# Patient Record
Sex: MALE | Race: WHITE | NOT HISPANIC OR LATINO | Employment: OTHER | RURAL
[De-identification: names, ages, dates, MRNs, and addresses within clinical notes are randomized per-mention and may not be internally consistent; named-entity substitution may affect disease eponyms.]

---

## 2013-03-20 LAB — CRC RECOMMENDATION EXT: NORMAL

## 2013-03-25 LAB — CRC RECOMMENDATION EXT: NORMAL

## 2020-05-11 ENCOUNTER — HISTORICAL (OUTPATIENT)
Dept: ADMINISTRATIVE | Facility: HOSPITAL | Age: 70
End: 2020-05-11

## 2020-11-10 ENCOUNTER — HISTORICAL (OUTPATIENT)
Dept: ADMINISTRATIVE | Facility: HOSPITAL | Age: 70
End: 2020-11-10

## 2020-11-10 LAB — SARS-COV+SARS-COV-2 AG RESP QL IA.RAPID: NEGATIVE

## 2021-03-24 DIAGNOSIS — R52 PAIN: Primary | ICD-10-CM

## 2021-03-24 RX ORDER — HYDROCODONE BITARTRATE AND ACETAMINOPHEN 10; 325 MG/1; MG/1
1 TABLET ORAL 3 TIMES DAILY PRN
Qty: 90 TABLET | Refills: 0 | Status: SHIPPED | OUTPATIENT
Start: 2021-03-24 | End: 2021-04-26 | Stop reason: SDUPTHER

## 2021-03-24 RX ORDER — HYDROCODONE BITARTRATE AND ACETAMINOPHEN 10; 325 MG/1; MG/1
1 TABLET ORAL 3 TIMES DAILY PRN
COMMUNITY
Start: 2021-02-23 | End: 2021-03-24 | Stop reason: SDUPTHER

## 2021-03-29 RX ORDER — LISINOPRIL 40 MG/1
40 TABLET ORAL DAILY
COMMUNITY
End: 2021-03-29 | Stop reason: SDUPTHER

## 2021-03-29 RX ORDER — LISINOPRIL 40 MG/1
40 TABLET ORAL DAILY
Qty: 30 TABLET | Refills: 1 | Status: SHIPPED | OUTPATIENT
Start: 2021-03-29 | End: 2021-06-15 | Stop reason: SDUPTHER

## 2021-04-06 DIAGNOSIS — G62.9 NEUROPATHY: Primary | ICD-10-CM

## 2021-04-07 RX ORDER — GABAPENTIN 600 MG/1
2 TABLET ORAL 2 TIMES DAILY PRN
COMMUNITY
Start: 2021-02-24 | End: 2021-04-07 | Stop reason: SDUPTHER

## 2021-04-07 RX ORDER — GABAPENTIN 600 MG/1
1200 TABLET ORAL 2 TIMES DAILY
Qty: 360 TABLET | Refills: 0 | Status: SHIPPED | OUTPATIENT
Start: 2021-04-07 | End: 2021-04-26 | Stop reason: SDUPTHER

## 2021-04-26 ENCOUNTER — OFFICE VISIT (OUTPATIENT)
Dept: FAMILY MEDICINE | Facility: CLINIC | Age: 71
End: 2021-04-26
Payer: MEDICARE

## 2021-04-26 VITALS
SYSTOLIC BLOOD PRESSURE: 125 MMHG | HEART RATE: 68 BPM | OXYGEN SATURATION: 96 % | BODY MASS INDEX: 33.93 KG/M2 | DIASTOLIC BLOOD PRESSURE: 72 MMHG | HEIGHT: 70 IN | WEIGHT: 237 LBS | TEMPERATURE: 99 F | RESPIRATION RATE: 16 BRPM

## 2021-04-26 DIAGNOSIS — G62.9 NEUROPATHY: ICD-10-CM

## 2021-04-26 DIAGNOSIS — N40.0 BENIGN PROSTATIC HYPERPLASIA, UNSPECIFIED WHETHER LOWER URINARY TRACT SYMPTOMS PRESENT: ICD-10-CM

## 2021-04-26 DIAGNOSIS — R52 PAIN: ICD-10-CM

## 2021-04-26 DIAGNOSIS — Z78.9 ALLERGY HISTORY UNKNOWN: Primary | ICD-10-CM

## 2021-04-26 PROCEDURE — 99214 OFFICE O/P EST MOD 30 MIN: CPT | Mod: 25,,, | Performed by: FAMILY MEDICINE

## 2021-04-26 PROCEDURE — 96372 THER/PROPH/DIAG INJ SC/IM: CPT | Mod: ,,, | Performed by: FAMILY MEDICINE

## 2021-04-26 PROCEDURE — 99214 PR OFFICE/OUTPT VISIT, EST, LEVL IV, 30-39 MIN: ICD-10-PCS | Mod: 25,,, | Performed by: FAMILY MEDICINE

## 2021-04-26 PROCEDURE — 96372 PR INJECTION,THERAP/PROPH/DIAG2ST, IM OR SUBCUT: ICD-10-PCS | Mod: ,,, | Performed by: FAMILY MEDICINE

## 2021-04-26 RX ORDER — KETOROLAC TROMETHAMINE 30 MG/ML
60 INJECTION, SOLUTION INTRAMUSCULAR; INTRAVENOUS
Status: COMPLETED | OUTPATIENT
Start: 2021-04-26 | End: 2021-04-26

## 2021-04-26 RX ORDER — ESCITALOPRAM OXALATE 20 MG/1
TABLET ORAL
COMMUNITY
Start: 2021-03-04 | End: 2021-05-03 | Stop reason: SDUPTHER

## 2021-04-26 RX ORDER — DEXAMETHASONE SODIUM PHOSPHATE 4 MG/ML
4 INJECTION, SOLUTION INTRA-ARTICULAR; INTRALESIONAL; INTRAMUSCULAR; INTRAVENOUS; SOFT TISSUE
Status: COMPLETED | OUTPATIENT
Start: 2021-04-26 | End: 2021-04-26

## 2021-04-26 RX ORDER — EZETIMIBE 10 MG/1
TABLET ORAL
COMMUNITY
Start: 2021-04-25 | End: 2021-08-26 | Stop reason: SDUPTHER

## 2021-04-26 RX ORDER — LORATADINE 10 MG/1
TABLET ORAL
COMMUNITY
Start: 2021-02-24 | End: 2021-04-26 | Stop reason: SDUPTHER

## 2021-04-26 RX ORDER — HYDROCODONE BITARTRATE AND ACETAMINOPHEN 10; 325 MG/1; MG/1
1 TABLET ORAL 3 TIMES DAILY PRN
Qty: 90 TABLET | Refills: 0 | Status: SHIPPED | OUTPATIENT
Start: 2021-04-26 | End: 2021-06-07 | Stop reason: SDUPTHER

## 2021-04-26 RX ORDER — PRAMIPEXOLE DIHYDROCHLORIDE 0.25 MG/1
0.25 TABLET ORAL 2 TIMES DAILY
COMMUNITY
Start: 2021-01-19 | End: 2021-07-12 | Stop reason: SDUPTHER

## 2021-04-26 RX ORDER — TAMSULOSIN HYDROCHLORIDE 0.4 MG/1
CAPSULE ORAL
COMMUNITY
Start: 2021-03-04 | End: 2021-07-23 | Stop reason: SDUPTHER

## 2021-04-26 RX ORDER — DICLOFENAC SODIUM 10 MG/G
GEL TOPICAL 4 TIMES DAILY PRN
COMMUNITY
Start: 2021-03-04 | End: 2021-05-10 | Stop reason: SDUPTHER

## 2021-04-26 RX ORDER — GABAPENTIN 600 MG/1
1200 TABLET ORAL 2 TIMES DAILY
Qty: 360 TABLET | Refills: 0 | Status: SHIPPED | OUTPATIENT
Start: 2021-04-26 | End: 2021-09-27 | Stop reason: SDUPTHER

## 2021-04-26 RX ORDER — METHOCARBAMOL 750 MG/1
1500 TABLET, FILM COATED ORAL 4 TIMES DAILY
Qty: 80 TABLET | Refills: 0 | Status: SHIPPED | OUTPATIENT
Start: 2021-04-26 | End: 2021-05-06

## 2021-04-26 RX ORDER — DUTASTERIDE 0.5 MG/1
0.5 CAPSULE, LIQUID FILLED ORAL DAILY
Qty: 30 CAPSULE | Refills: 2 | Status: SHIPPED | OUTPATIENT
Start: 2021-04-26 | End: 2022-04-12

## 2021-04-26 RX ORDER — DULOXETIN HYDROCHLORIDE 60 MG/1
CAPSULE, DELAYED RELEASE ORAL
COMMUNITY
Start: 2021-02-11 | End: 2021-05-03 | Stop reason: SDUPTHER

## 2021-04-26 RX ORDER — METHYLPREDNISOLONE ACETATE 40 MG/ML
40 INJECTION, SUSPENSION INTRA-ARTICULAR; INTRALESIONAL; INTRAMUSCULAR; SOFT TISSUE
Status: COMPLETED | OUTPATIENT
Start: 2021-04-26 | End: 2021-04-26

## 2021-04-26 RX ORDER — LORATADINE 10 MG/1
10 TABLET ORAL DAILY
Qty: 90 TABLET | Refills: 1 | Status: SHIPPED | OUTPATIENT
Start: 2021-04-26 | End: 2022-04-12 | Stop reason: SDUPTHER

## 2021-04-26 RX ADMIN — METHYLPREDNISOLONE ACETATE 40 MG: 40 INJECTION, SUSPENSION INTRA-ARTICULAR; INTRALESIONAL; INTRAMUSCULAR; SOFT TISSUE at 04:04

## 2021-04-26 RX ADMIN — DEXAMETHASONE SODIUM PHOSPHATE 4 MG: 4 INJECTION, SOLUTION INTRA-ARTICULAR; INTRALESIONAL; INTRAMUSCULAR; INTRAVENOUS; SOFT TISSUE at 04:04

## 2021-04-26 RX ADMIN — KETOROLAC TROMETHAMINE 60 MG: 30 INJECTION, SOLUTION INTRAMUSCULAR; INTRAVENOUS at 04:04

## 2021-05-03 DIAGNOSIS — F32.A DEPRESSION, UNSPECIFIED DEPRESSION TYPE: ICD-10-CM

## 2021-05-03 DIAGNOSIS — F41.9 ANXIETY: Primary | ICD-10-CM

## 2021-05-03 RX ORDER — ESCITALOPRAM OXALATE 20 MG/1
20 TABLET ORAL DAILY
Qty: 30 TABLET | Refills: 2 | Status: SHIPPED | OUTPATIENT
Start: 2021-05-03 | End: 2021-06-15 | Stop reason: SDUPTHER

## 2021-05-03 RX ORDER — DULOXETIN HYDROCHLORIDE 60 MG/1
60 CAPSULE, DELAYED RELEASE ORAL 2 TIMES DAILY
Qty: 180 CAPSULE | Refills: 0 | Status: SHIPPED | OUTPATIENT
Start: 2021-05-03 | End: 2021-07-23 | Stop reason: SDUPTHER

## 2021-05-10 DIAGNOSIS — R52 PAIN: Primary | ICD-10-CM

## 2021-05-10 RX ORDER — DICLOFENAC SODIUM 10 MG/G
GEL TOPICAL
Qty: 3000 G | Refills: 3 | Status: SHIPPED | OUTPATIENT
Start: 2021-05-10 | End: 2022-09-30 | Stop reason: SDUPTHER

## 2021-06-02 RX ORDER — LISINOPRIL 40 MG/1
40 TABLET ORAL DAILY
Qty: 30 TABLET | Refills: 0 | OUTPATIENT
Start: 2021-06-02

## 2021-06-07 ENCOUNTER — OFFICE VISIT (OUTPATIENT)
Dept: FAMILY MEDICINE | Facility: CLINIC | Age: 71
End: 2021-06-07
Payer: MEDICARE

## 2021-06-07 VITALS
HEART RATE: 79 BPM | OXYGEN SATURATION: 95 % | RESPIRATION RATE: 16 BRPM | BODY MASS INDEX: 34.22 KG/M2 | HEIGHT: 70 IN | SYSTOLIC BLOOD PRESSURE: 114 MMHG | DIASTOLIC BLOOD PRESSURE: 63 MMHG | TEMPERATURE: 98 F | WEIGHT: 239 LBS

## 2021-06-07 DIAGNOSIS — R52 PAIN: Primary | ICD-10-CM

## 2021-06-07 DIAGNOSIS — N40.0 BENIGN PROSTATIC HYPERPLASIA, UNSPECIFIED WHETHER LOWER URINARY TRACT SYMPTOMS PRESENT: ICD-10-CM

## 2021-06-07 DIAGNOSIS — G62.9 NEUROPATHY: ICD-10-CM

## 2021-06-07 LAB

## 2021-06-07 PROCEDURE — 99214 PR OFFICE/OUTPT VISIT, EST, LEVL IV, 30-39 MIN: ICD-10-PCS | Mod: ,,, | Performed by: FAMILY MEDICINE

## 2021-06-07 PROCEDURE — G0480 DRUG SCREEN DEFINITIVE 7, URINE: ICD-10-PCS | Mod: ,,, | Performed by: CLINICAL MEDICAL LABORATORY

## 2021-06-07 PROCEDURE — G0480 DRUG TEST DEF 1-7 CLASSES: HCPCS | Mod: ,,, | Performed by: CLINICAL MEDICAL LABORATORY

## 2021-06-07 PROCEDURE — 80305 DRUG TEST PRSMV DIR OPT OBS: CPT | Mod: GA,RHCUB | Performed by: FAMILY MEDICINE

## 2021-06-07 PROCEDURE — 99214 OFFICE O/P EST MOD 30 MIN: CPT | Mod: ,,, | Performed by: FAMILY MEDICINE

## 2021-06-07 RX ORDER — HYDROCODONE BITARTRATE AND ACETAMINOPHEN 10; 325 MG/1; MG/1
1 TABLET ORAL 3 TIMES DAILY PRN
Qty: 90 TABLET | Refills: 0 | Status: SHIPPED | OUTPATIENT
Start: 2021-06-07 | End: 2021-06-29 | Stop reason: SDUPTHER

## 2021-06-10 ENCOUNTER — OFFICE VISIT (OUTPATIENT)
Dept: FAMILY MEDICINE | Facility: CLINIC | Age: 71
End: 2021-06-10
Payer: MEDICARE

## 2021-06-10 VITALS
SYSTOLIC BLOOD PRESSURE: 130 MMHG | OXYGEN SATURATION: 92 % | BODY MASS INDEX: 34.5 KG/M2 | DIASTOLIC BLOOD PRESSURE: 78 MMHG | HEIGHT: 70 IN | WEIGHT: 241 LBS | TEMPERATURE: 99 F | RESPIRATION RATE: 20 BRPM | HEART RATE: 81 BPM

## 2021-06-10 DIAGNOSIS — G47.00 INSOMNIA, UNSPECIFIED TYPE: ICD-10-CM

## 2021-06-10 DIAGNOSIS — F19.939 WITHDRAWAL FROM OTHER PSYCHOACTIVE SUBSTANCE: Primary | ICD-10-CM

## 2021-06-10 PROCEDURE — 99203 PR OFFICE/OUTPT VISIT, NEW, LEVL III, 30-44 MIN: ICD-10-PCS | Mod: GC,,, | Performed by: FAMILY MEDICINE

## 2021-06-10 PROCEDURE — 99203 OFFICE O/P NEW LOW 30 MIN: CPT | Mod: GC,,, | Performed by: FAMILY MEDICINE

## 2021-06-10 RX ORDER — TRAZODONE HYDROCHLORIDE 100 MG/1
2 TABLET ORAL NIGHTLY
COMMUNITY
Start: 2021-05-19 | End: 2021-08-26 | Stop reason: SDUPTHER

## 2021-06-11 LAB
6-ACETYLMORPHINE, URINE (RUSH): NEGATIVE 10 NG/ML
7-AMINOCLONAZEPAM, URINE (RUSH): NEGATIVE 25 NG/ML
A-HYDROXYALPRAZOLAM, URINE (RUSH): NEGATIVE 25 NG/ML
AMPHET UR QL SCN: NEGATIVE 100 NG/ML
BENZOYLECGONINE, URINE (RUSH): NEGATIVE 100 NG/ML
BUTALBITAL, URINE (RUSH): NEGATIVE 50 NG/ML
CODEINE, URINE (RUSH): NEGATIVE 25 NG/ML
CREAT UR-MCNC: 175 MG/DL (ref 39–259)
HYDROCODONE, URINE (RUSH): 33.3 25 NG/ML
HYDROMORPHONE, URINE (RUSH): NEGATIVE 25 NG/ML
LORAZEPAM, URINE (RUSH): NEGATIVE 25 NG/ML
METHAMPHET UR QL SCN: NEGATIVE 100 NG/ML
MORPHINE, URINE (RUSH): NEGATIVE 25 NG/ML
NORDIAZEPAM, URINE (RUSH): NEGATIVE 25 NG/ML
NORHYDROCODONE, URINE (RUSH): 90 50 NG/ML
NOROXYCODONE HCL, URINE (RUSH): NEGATIVE 50 NG/ML
OXAZEPAM, URINE (RUSH): NEGATIVE 25 NG/ML
OXYCODONE UR QL SCN: NEGATIVE 25 NG/ML
OXYMORPHONE, URINE (RUSH): NEGATIVE 25 NG/ML
PH UR STRIP: 5.5 PH UNITS
PHENOBARBITAL, URINE (RUSH): NEGATIVE 50 NG/ML
SECOBARBITAL, URINE (RUSH): NEGATIVE 50 NG/ML
SP GR UR STRIP: >=1.03
TEMAZEPAM, URINE (RUSH): NEGATIVE 25 NG/ML

## 2021-06-15 DIAGNOSIS — F41.9 ANXIETY: ICD-10-CM

## 2021-06-15 DIAGNOSIS — I10 HYPERTENSION, UNSPECIFIED TYPE: Primary | ICD-10-CM

## 2021-06-15 RX ORDER — LISINOPRIL 40 MG/1
40 TABLET ORAL DAILY
Qty: 90 TABLET | Refills: 1 | Status: SHIPPED | OUTPATIENT
Start: 2021-06-15 | End: 2021-09-27 | Stop reason: SDUPTHER

## 2021-06-15 RX ORDER — ESCITALOPRAM OXALATE 20 MG/1
20 TABLET ORAL DAILY
Qty: 90 TABLET | Refills: 1 | Status: SHIPPED | OUTPATIENT
Start: 2021-06-15 | End: 2021-09-27 | Stop reason: SDUPTHER

## 2021-06-29 ENCOUNTER — OFFICE VISIT (OUTPATIENT)
Dept: FAMILY MEDICINE | Facility: CLINIC | Age: 71
End: 2021-06-29
Payer: MEDICARE

## 2021-06-29 VITALS
OXYGEN SATURATION: 96 % | HEIGHT: 70 IN | HEART RATE: 77 BPM | WEIGHT: 241 LBS | TEMPERATURE: 98 F | RESPIRATION RATE: 17 BRPM | BODY MASS INDEX: 34.5 KG/M2

## 2021-06-29 DIAGNOSIS — R79.89 LOW TESTOSTERONE: ICD-10-CM

## 2021-06-29 DIAGNOSIS — R52 PAIN: ICD-10-CM

## 2021-06-29 DIAGNOSIS — Z79.891 ENCOUNTER FOR LONG-TERM USE OF OPIATE ANALGESIC: Primary | ICD-10-CM

## 2021-06-29 LAB
CTP QC/QA: YES
POC (AMP) AMPHETAMINE: NEGATIVE
POC (BAR) BARBITURATES: NEGATIVE
POC (BUP) BUPRENORPHINE: NEGATIVE
POC (BZO) BENZODIAZEPINES: NEGATIVE
POC (COC) COCAINE: NEGATIVE
POC (MDMA) METHYLENEDIOXYMETHAMPHETAMINE 3,4: NEGATIVE
POC (MET) METHAMPHETAMINE: NEGATIVE
POC (MOP) OPIATES: ABNORMAL
POC (MTD) METHADONE: NEGATIVE
POC (OXY) OXYCODONE: NEGATIVE
POC (PCP) PHENCYCLIDINE: NEGATIVE
POC (TCA) TRICYCLIC ANTIDEPRESSANTS: NEGATIVE
POC TEMPERATURE (URINE): 92
POC THC: NEGATIVE

## 2021-06-29 PROCEDURE — 99214 PR OFFICE/OUTPT VISIT, EST, LEVL IV, 30-39 MIN: ICD-10-PCS | Mod: 25,,, | Performed by: FAMILY MEDICINE

## 2021-06-29 PROCEDURE — 84402 TESTOSTERONE, FREE AND TOTAL: ICD-10-PCS | Mod: 90,ICN,, | Performed by: CLINICAL MEDICAL LABORATORY

## 2021-06-29 PROCEDURE — G0480 DRUG TEST DEF 1-7 CLASSES: HCPCS | Mod: ICN,,, | Performed by: CLINICAL MEDICAL LABORATORY

## 2021-06-29 PROCEDURE — 99214 OFFICE O/P EST MOD 30 MIN: CPT | Mod: 25,,, | Performed by: FAMILY MEDICINE

## 2021-06-29 PROCEDURE — 96372 PR INJECTION,THERAP/PROPH/DIAG2ST, IM OR SUBCUT: ICD-10-PCS | Mod: ,,, | Performed by: FAMILY MEDICINE

## 2021-06-29 PROCEDURE — 80305 DRUG TEST PRSMV DIR OPT OBS: CPT | Mod: RHCUB | Performed by: FAMILY MEDICINE

## 2021-06-29 PROCEDURE — 96372 THER/PROPH/DIAG INJ SC/IM: CPT | Mod: ,,, | Performed by: FAMILY MEDICINE

## 2021-06-29 PROCEDURE — 84402 ASSAY OF FREE TESTOSTERONE: CPT | Mod: 90,ICN,, | Performed by: CLINICAL MEDICAL LABORATORY

## 2021-06-29 PROCEDURE — 84403 ASSAY OF TOTAL TESTOSTERONE: CPT | Mod: 90,ICN,, | Performed by: CLINICAL MEDICAL LABORATORY

## 2021-06-29 PROCEDURE — G0480 DRUG SCREEN DEFINITIVE 7, URINE: ICD-10-PCS | Mod: ICN,,, | Performed by: CLINICAL MEDICAL LABORATORY

## 2021-06-29 PROCEDURE — 84403 TESTOSTERONE, FREE AND TOTAL: ICD-10-PCS | Mod: 90,ICN,, | Performed by: CLINICAL MEDICAL LABORATORY

## 2021-06-29 RX ORDER — HYDROCODONE BITARTRATE AND ACETAMINOPHEN 10; 325 MG/1; MG/1
1 TABLET ORAL 3 TIMES DAILY PRN
Qty: 90 TABLET | Refills: 0 | Status: SHIPPED | OUTPATIENT
Start: 2021-06-29

## 2021-06-29 RX ORDER — TESTOSTERONE CYPIONATE 200 MG/ML
200 INJECTION, SOLUTION INTRAMUSCULAR
Status: COMPLETED | OUTPATIENT
Start: 2021-06-29 | End: 2021-06-29

## 2021-06-29 RX ADMIN — TESTOSTERONE CYPIONATE 200 MG: 200 INJECTION, SOLUTION INTRAMUSCULAR at 10:06

## 2021-07-01 ENCOUNTER — PATIENT MESSAGE (OUTPATIENT)
Dept: ADMINISTRATIVE | Facility: OTHER | Age: 71
End: 2021-07-01

## 2021-07-01 LAB
TESTOST FREE SERPL-MCNC: 5.46 NG/DL (ref 3.28–12.2)
TESTOST SERPL-MCNC: 210 NG/DL (ref 240–950)

## 2021-07-02 LAB
6-ACETYLMORPHINE, URINE (RUSH): NEGATIVE 10 NG/ML
7-AMINOCLONAZEPAM, URINE (RUSH): NEGATIVE 25 NG/ML
A-HYDROXYALPRAZOLAM, URINE (RUSH): NEGATIVE 25 NG/ML
AMPHET UR QL SCN: NEGATIVE 100 NG/ML
BENZOYLECGONINE, URINE (RUSH): NEGATIVE 100 NG/ML
BUTALBITAL, URINE (RUSH): NEGATIVE 50 NG/ML
CODEINE, URINE (RUSH): NEGATIVE 25 NG/ML
CREAT UR-MCNC: 163 MG/DL (ref 39–259)
HYDROCODONE, URINE (RUSH): >250 25 NG/ML
HYDROMORPHONE, URINE (RUSH): 133.3 25 NG/ML
LORAZEPAM, URINE (RUSH): NEGATIVE 25 NG/ML
METHAMPHET UR QL SCN: NEGATIVE 100 NG/ML
MORPHINE, URINE (RUSH): NEGATIVE 25 NG/ML
NORDIAZEPAM, URINE (RUSH): NEGATIVE 25 NG/ML
NORHYDROCODONE, URINE (RUSH): >500 50 NG/ML
NOROXYCODONE HCL, URINE (RUSH): NEGATIVE 50 NG/ML
OXAZEPAM, URINE (RUSH): NEGATIVE 25 NG/ML
OXYCODONE UR QL SCN: NEGATIVE 25 NG/ML
OXYMORPHONE, URINE (RUSH): NEGATIVE 25 NG/ML
PH UR STRIP: 5.5 PH UNITS
PHENOBARBITAL, URINE (RUSH): NEGATIVE 50 NG/ML
SECOBARBITAL, URINE (RUSH): NEGATIVE 50 NG/ML
SP GR UR STRIP: >=1.03
TEMAZEPAM, URINE (RUSH): NEGATIVE 25 NG/ML

## 2021-07-12 ENCOUNTER — OFFICE VISIT (OUTPATIENT)
Dept: FAMILY MEDICINE | Facility: CLINIC | Age: 71
End: 2021-07-12
Payer: MEDICARE

## 2021-07-12 VITALS
HEIGHT: 70 IN | HEART RATE: 83 BPM | RESPIRATION RATE: 17 BRPM | DIASTOLIC BLOOD PRESSURE: 86 MMHG | BODY MASS INDEX: 34.36 KG/M2 | TEMPERATURE: 98 F | OXYGEN SATURATION: 100 % | WEIGHT: 240 LBS | SYSTOLIC BLOOD PRESSURE: 150 MMHG

## 2021-07-12 DIAGNOSIS — G62.9 NEUROPATHY: Primary | ICD-10-CM

## 2021-07-12 PROCEDURE — 99212 PR OFFICE/OUTPT VISIT, EST, LEVL II, 10-19 MIN: ICD-10-PCS | Mod: ,,, | Performed by: NURSE PRACTITIONER

## 2021-07-12 PROCEDURE — 99212 OFFICE O/P EST SF 10 MIN: CPT | Mod: ,,, | Performed by: NURSE PRACTITIONER

## 2021-07-12 RX ORDER — PRAMIPEXOLE DIHYDROCHLORIDE 0.25 MG/1
0.25 TABLET ORAL 2 TIMES DAILY
Qty: 90 TABLET | Refills: 1 | Status: SHIPPED | OUTPATIENT
Start: 2021-07-12 | End: 2021-09-27 | Stop reason: SDUPTHER

## 2021-07-23 ENCOUNTER — OFFICE VISIT (OUTPATIENT)
Dept: FAMILY MEDICINE | Facility: CLINIC | Age: 71
End: 2021-07-23
Payer: MEDICARE

## 2021-07-23 VITALS
OXYGEN SATURATION: 95 % | HEIGHT: 70 IN | RESPIRATION RATE: 20 BRPM | HEART RATE: 82 BPM | BODY MASS INDEX: 34.79 KG/M2 | SYSTOLIC BLOOD PRESSURE: 136 MMHG | DIASTOLIC BLOOD PRESSURE: 71 MMHG | WEIGHT: 243 LBS | TEMPERATURE: 97 F

## 2021-07-23 DIAGNOSIS — E78.5 HYPERLIPIDEMIA, UNSPECIFIED HYPERLIPIDEMIA TYPE: ICD-10-CM

## 2021-07-23 DIAGNOSIS — Z12.5 ENCOUNTER FOR PROSTATE CANCER SCREENING: ICD-10-CM

## 2021-07-23 DIAGNOSIS — F32.A DEPRESSION, UNSPECIFIED DEPRESSION TYPE: ICD-10-CM

## 2021-07-23 DIAGNOSIS — I10 ESSENTIAL HYPERTENSION: Primary | ICD-10-CM

## 2021-07-23 LAB
ALBUMIN SERPL BCP-MCNC: 3.8 G/DL (ref 3.5–5)
ALBUMIN/GLOB SERPL: 1 {RATIO}
ALP SERPL-CCNC: 84 U/L (ref 45–115)
ALT SERPL W P-5'-P-CCNC: 24 U/L (ref 16–61)
ANION GAP SERPL CALCULATED.3IONS-SCNC: 11 MMOL/L (ref 7–16)
AST SERPL W P-5'-P-CCNC: 10 U/L (ref 15–37)
BASOPHILS # BLD AUTO: 0.05 K/UL (ref 0–0.2)
BASOPHILS NFR BLD AUTO: 0.6 % (ref 0–1)
BILIRUB SERPL-MCNC: 0.5 MG/DL (ref 0–1.2)
BUN SERPL-MCNC: 20 MG/DL (ref 7–18)
BUN/CREAT SERPL: 19 (ref 6–20)
CALCIUM SERPL-MCNC: 8.8 MG/DL (ref 8.5–10.1)
CHLORIDE SERPL-SCNC: 105 MMOL/L (ref 98–107)
CHOLEST SERPL-MCNC: 186 MG/DL (ref 0–200)
CHOLEST/HDLC SERPL: 3 {RATIO}
CO2 SERPL-SCNC: 26 MMOL/L (ref 21–32)
CREAT SERPL-MCNC: 1.06 MG/DL (ref 0.7–1.3)
DIFFERENTIAL METHOD BLD: ABNORMAL
EOSINOPHIL # BLD AUTO: 0.16 K/UL (ref 0–0.5)
EOSINOPHIL NFR BLD AUTO: 1.8 % (ref 1–4)
ERYTHROCYTE [DISTWIDTH] IN BLOOD BY AUTOMATED COUNT: 15.3 % (ref 11.5–14.5)
GLOBULIN SER-MCNC: 3.7 G/DL (ref 2–4)
GLUCOSE SERPL-MCNC: 152 MG/DL (ref 74–106)
HCT VFR BLD AUTO: 42.1 % (ref 40–54)
HDLC SERPL-MCNC: 61 MG/DL (ref 40–60)
HGB BLD-MCNC: 13.4 G/DL (ref 13.5–18)
IMM GRANULOCYTES # BLD AUTO: 0.03 K/UL (ref 0–0.04)
IMM GRANULOCYTES NFR BLD: 0.3 % (ref 0–0.4)
LDLC SERPL CALC-MCNC: 107 MG/DL
LDLC/HDLC SERPL: 1.8 {RATIO}
LYMPHOCYTES # BLD AUTO: 2.29 K/UL (ref 1–4.8)
LYMPHOCYTES NFR BLD AUTO: 26.1 % (ref 27–41)
MCH RBC QN AUTO: 29.5 PG (ref 27–31)
MCHC RBC AUTO-ENTMCNC: 31.8 G/DL (ref 32–36)
MCV RBC AUTO: 92.5 FL (ref 80–96)
MONOCYTES # BLD AUTO: 0.62 K/UL (ref 0–0.8)
MONOCYTES NFR BLD AUTO: 7.1 % (ref 2–6)
MPC BLD CALC-MCNC: 11.2 FL (ref 9.4–12.4)
NEUTROPHILS # BLD AUTO: 5.63 K/UL (ref 1.8–7.7)
NEUTROPHILS NFR BLD AUTO: 64.1 % (ref 53–65)
NONHDLC SERPL-MCNC: 125 MG/DL
NRBC # BLD AUTO: 0 X10E3/UL
NRBC, AUTO (.00): 0 %
PLATELET # BLD AUTO: 282 K/UL (ref 150–400)
POTASSIUM SERPL-SCNC: 4.5 MMOL/L (ref 3.5–5.1)
PROT SERPL-MCNC: 7.5 G/DL (ref 6.4–8.2)
PSA SERPL-MCNC: 1.22 NG/ML (ref 0–4.4)
RBC # BLD AUTO: 4.55 M/UL (ref 4.6–6.2)
SODIUM SERPL-SCNC: 137 MMOL/L (ref 136–145)
TRIGL SERPL-MCNC: 88 MG/DL (ref 35–150)
VLDLC SERPL-MCNC: 18 MG/DL
WBC # BLD AUTO: 8.78 K/UL (ref 4.5–11)

## 2021-07-23 PROCEDURE — 80053 COMPREHEN METABOLIC PANEL: CPT | Mod: ,,, | Performed by: CLINICAL MEDICAL LABORATORY

## 2021-07-23 PROCEDURE — 85025 CBC WITH DIFFERENTIAL: ICD-10-PCS | Mod: ,,, | Performed by: CLINICAL MEDICAL LABORATORY

## 2021-07-23 PROCEDURE — 80061 LIPID PANEL: ICD-10-PCS | Mod: ,,, | Performed by: CLINICAL MEDICAL LABORATORY

## 2021-07-23 PROCEDURE — G0103 PSA SCREENING: HCPCS | Mod: ,,, | Performed by: CLINICAL MEDICAL LABORATORY

## 2021-07-23 PROCEDURE — 99213 OFFICE O/P EST LOW 20 MIN: CPT | Mod: ,,, | Performed by: NURSE PRACTITIONER

## 2021-07-23 PROCEDURE — 99213 PR OFFICE/OUTPT VISIT, EST, LEVL III, 20-29 MIN: ICD-10-PCS | Mod: ,,, | Performed by: NURSE PRACTITIONER

## 2021-07-23 PROCEDURE — 80053 COMPREHENSIVE METABOLIC PANEL: ICD-10-PCS | Mod: ,,, | Performed by: CLINICAL MEDICAL LABORATORY

## 2021-07-23 PROCEDURE — 80061 LIPID PANEL: CPT | Mod: ,,, | Performed by: CLINICAL MEDICAL LABORATORY

## 2021-07-23 PROCEDURE — 85025 COMPLETE CBC W/AUTO DIFF WBC: CPT | Mod: ,,, | Performed by: CLINICAL MEDICAL LABORATORY

## 2021-07-23 PROCEDURE — G0103 PSA, SCREENING: ICD-10-PCS | Mod: ,,, | Performed by: CLINICAL MEDICAL LABORATORY

## 2021-07-23 RX ORDER — DULOXETIN HYDROCHLORIDE 60 MG/1
60 CAPSULE, DELAYED RELEASE ORAL 2 TIMES DAILY
Qty: 180 CAPSULE | Refills: 0 | Status: SHIPPED | OUTPATIENT
Start: 2021-07-23 | End: 2021-11-15 | Stop reason: SDUPTHER

## 2021-07-23 RX ORDER — TAMSULOSIN HYDROCHLORIDE 0.4 MG/1
0.4 CAPSULE ORAL DAILY
Qty: 90 CAPSULE | Refills: 1 | Status: SHIPPED | OUTPATIENT
Start: 2021-07-23 | End: 2021-07-23

## 2021-07-23 RX ORDER — TAMSULOSIN HYDROCHLORIDE 0.4 MG/1
0.4 CAPSULE ORAL DAILY
Qty: 90 CAPSULE | Refills: 1 | Status: SHIPPED | OUTPATIENT
Start: 2021-07-23 | End: 2022-01-19 | Stop reason: SDUPTHER

## 2021-07-23 RX ORDER — DULOXETIN HYDROCHLORIDE 60 MG/1
60 CAPSULE, DELAYED RELEASE ORAL 2 TIMES DAILY
Qty: 180 CAPSULE | Refills: 0 | Status: SHIPPED | OUTPATIENT
Start: 2021-07-23 | End: 2021-07-23

## 2021-08-26 ENCOUNTER — OFFICE VISIT (OUTPATIENT)
Dept: FAMILY MEDICINE | Facility: CLINIC | Age: 71
End: 2021-08-26
Payer: MEDICARE

## 2021-08-26 VITALS
RESPIRATION RATE: 20 BRPM | SYSTOLIC BLOOD PRESSURE: 146 MMHG | HEART RATE: 76 BPM | BODY MASS INDEX: 34.36 KG/M2 | WEIGHT: 240 LBS | HEIGHT: 70 IN | DIASTOLIC BLOOD PRESSURE: 80 MMHG | OXYGEN SATURATION: 96 %

## 2021-08-26 DIAGNOSIS — G47.00 INSOMNIA, UNSPECIFIED TYPE: ICD-10-CM

## 2021-08-26 DIAGNOSIS — I10 ESSENTIAL HYPERTENSION: Primary | ICD-10-CM

## 2021-08-26 PROCEDURE — 99203 OFFICE O/P NEW LOW 30 MIN: CPT | Mod: ,,, | Performed by: INTERNAL MEDICINE

## 2021-08-26 PROCEDURE — 99203 PR OFFICE/OUTPT VISIT, NEW, LEVL III, 30-44 MIN: ICD-10-PCS | Mod: ,,, | Performed by: INTERNAL MEDICINE

## 2021-08-26 RX ORDER — TRAZODONE HYDROCHLORIDE 100 MG/1
200 TABLET ORAL NIGHTLY
Qty: 30 TABLET | Refills: 3 | Status: SHIPPED | OUTPATIENT
Start: 2021-08-26 | End: 2022-01-05 | Stop reason: SDUPTHER

## 2021-08-26 RX ORDER — EZETIMIBE 10 MG/1
10 TABLET ORAL DAILY
Qty: 30 TABLET | Refills: 3 | Status: SHIPPED | OUTPATIENT
Start: 2021-08-26 | End: 2022-01-31 | Stop reason: SDUPTHER

## 2021-09-27 ENCOUNTER — OFFICE VISIT (OUTPATIENT)
Dept: FAMILY MEDICINE | Facility: CLINIC | Age: 71
End: 2021-09-27
Payer: MEDICARE

## 2021-09-27 DIAGNOSIS — G25.81 RESTLESS LEG SYNDROME: ICD-10-CM

## 2021-09-27 DIAGNOSIS — I10 HYPERTENSION, UNSPECIFIED TYPE: ICD-10-CM

## 2021-09-27 DIAGNOSIS — G62.9 NEUROPATHY: ICD-10-CM

## 2021-09-27 DIAGNOSIS — G89.4 CHRONIC PAIN SYNDROME: ICD-10-CM

## 2021-09-27 DIAGNOSIS — F33.9 RECURRENT MAJOR DEPRESSIVE DISORDER, REMISSION STATUS UNSPECIFIED: Primary | ICD-10-CM

## 2021-09-27 DIAGNOSIS — F41.9 ANXIETY: ICD-10-CM

## 2021-09-27 PROCEDURE — 99214 PR OFFICE/OUTPT VISIT, EST, LEVL IV, 30-39 MIN: ICD-10-PCS | Mod: ,,, | Performed by: INTERNAL MEDICINE

## 2021-09-27 PROCEDURE — 99214 OFFICE O/P EST MOD 30 MIN: CPT | Mod: ,,, | Performed by: INTERNAL MEDICINE

## 2021-09-27 RX ORDER — ESCITALOPRAM OXALATE 20 MG/1
20 TABLET ORAL DAILY
Qty: 90 TABLET | Refills: 1 | Status: SHIPPED | OUTPATIENT
Start: 2021-09-27 | End: 2022-01-04 | Stop reason: SDUPTHER

## 2021-09-27 RX ORDER — LISINOPRIL 40 MG/1
40 TABLET ORAL DAILY
Qty: 90 TABLET | Refills: 1 | Status: SHIPPED | OUTPATIENT
Start: 2021-09-27 | End: 2022-01-04 | Stop reason: SDUPTHER

## 2021-09-27 RX ORDER — PRAMIPEXOLE DIHYDROCHLORIDE 0.25 MG/1
0.25 TABLET ORAL 2 TIMES DAILY
Qty: 90 TABLET | Refills: 1 | Status: SHIPPED | OUTPATIENT
Start: 2021-09-27 | End: 2022-03-30 | Stop reason: SDUPTHER

## 2021-09-27 RX ORDER — GABAPENTIN 600 MG/1
1200 TABLET ORAL 2 TIMES DAILY
Qty: 360 TABLET | Refills: 0 | Status: SHIPPED | OUTPATIENT
Start: 2021-09-27 | End: 2022-05-19 | Stop reason: SDUPTHER

## 2021-11-15 DIAGNOSIS — F32.A DEPRESSION, UNSPECIFIED DEPRESSION TYPE: ICD-10-CM

## 2021-11-15 RX ORDER — DULOXETIN HYDROCHLORIDE 60 MG/1
60 CAPSULE, DELAYED RELEASE ORAL 2 TIMES DAILY
Qty: 60 CAPSULE | Refills: 3 | Status: SHIPPED | OUTPATIENT
Start: 2021-11-15 | End: 2022-01-19 | Stop reason: SDUPTHER

## 2021-12-13 ENCOUNTER — HOSPITAL ENCOUNTER (OUTPATIENT)
Dept: RADIOLOGY | Facility: HOSPITAL | Age: 71
Discharge: HOME | End: 2021-12-13
Attending: NURSE PRACTITIONER
Payer: MEDICARE

## 2021-12-13 DIAGNOSIS — M41.9 SCOLIOSIS: ICD-10-CM

## 2021-12-13 PROCEDURE — 72131 CT LUMBAR SPINE W/O DYE: CPT | Mod: 26,,, | Performed by: RADIOLOGY

## 2021-12-13 PROCEDURE — 72131 CT LUMBAR SPINE WITHOUT CONTRAST: ICD-10-PCS | Mod: 26,,, | Performed by: RADIOLOGY

## 2021-12-13 PROCEDURE — 72131 CT LUMBAR SPINE W/O DYE: CPT | Mod: TC

## 2022-01-04 ENCOUNTER — OFFICE VISIT (OUTPATIENT)
Dept: FAMILY MEDICINE | Facility: CLINIC | Age: 72
End: 2022-01-04
Payer: MEDICARE

## 2022-01-04 VITALS
DIASTOLIC BLOOD PRESSURE: 80 MMHG | BODY MASS INDEX: 34.65 KG/M2 | SYSTOLIC BLOOD PRESSURE: 132 MMHG | WEIGHT: 242 LBS | HEART RATE: 78 BPM | OXYGEN SATURATION: 96 % | RESPIRATION RATE: 20 BRPM | HEIGHT: 70 IN

## 2022-01-04 DIAGNOSIS — I10 HYPERTENSION, UNSPECIFIED TYPE: ICD-10-CM

## 2022-01-04 DIAGNOSIS — F41.9 ANXIETY: ICD-10-CM

## 2022-01-04 DIAGNOSIS — Z23 ENCOUNTER FOR ADMINISTRATION OF VACCINE: Primary | ICD-10-CM

## 2022-01-04 PROCEDURE — 99214 OFFICE O/P EST MOD 30 MIN: CPT | Mod: ,,, | Performed by: INTERNAL MEDICINE

## 2022-01-04 PROCEDURE — G0008 ADMIN INFLUENZA VIRUS VAC: HCPCS | Mod: ,,, | Performed by: INTERNAL MEDICINE

## 2022-01-04 PROCEDURE — 90662 FLU VACCINE - QUADRIVALENT - HIGH DOSE (65+) PRESERVATIVE FREE IM: ICD-10-PCS | Mod: ,,, | Performed by: INTERNAL MEDICINE

## 2022-01-04 PROCEDURE — G0008 FLU VACCINE - QUADRIVALENT - HIGH DOSE (65+) PRESERVATIVE FREE IM: ICD-10-PCS | Mod: ,,, | Performed by: INTERNAL MEDICINE

## 2022-01-04 PROCEDURE — 99214 PR OFFICE/OUTPT VISIT, EST, LEVL IV, 30-39 MIN: ICD-10-PCS | Mod: ,,, | Performed by: INTERNAL MEDICINE

## 2022-01-04 PROCEDURE — 90662 IIV NO PRSV INCREASED AG IM: CPT | Mod: ,,, | Performed by: INTERNAL MEDICINE

## 2022-01-04 RX ORDER — ESCITALOPRAM OXALATE 20 MG/1
20 TABLET ORAL DAILY
Qty: 90 TABLET | Refills: 1 | Status: SHIPPED | OUTPATIENT
Start: 2022-01-04 | End: 2022-04-12 | Stop reason: SDUPTHER

## 2022-01-04 RX ORDER — LISINOPRIL 40 MG/1
40 TABLET ORAL DAILY
Qty: 90 TABLET | Refills: 1 | Status: SHIPPED | OUTPATIENT
Start: 2022-01-04 | End: 2022-04-12 | Stop reason: SDUPTHER

## 2022-01-05 RX ORDER — TRAZODONE HYDROCHLORIDE 100 MG/1
200 TABLET ORAL NIGHTLY
Qty: 30 TABLET | Refills: 3 | Status: SHIPPED | OUTPATIENT
Start: 2022-01-05 | End: 2022-02-24 | Stop reason: SDUPTHER

## 2022-01-05 NOTE — TELEPHONE ENCOUNTER
Will send to pharmacy. Unable to reach pt. Due to no voicemail being set up.     ----- Message from Shania Cortes V sent at 12/30/2021 12:49 PM CST -----  REFILLS traZODone (DESYREL) 100 MG tablet AND EScitalopram oxalate (LEXAPRO) 20 MG tablet [phone]

## 2022-01-05 NOTE — PROGRESS NOTES
Subjective:       Patient ID: Chano Clement is a 71 y.o. male.    Chief Complaint: Follow-up, Hypertension, and Medication Refill    HPI PATIENT SEEN ON 01/04/2022 HE IS AWAKE ALERT NO ACUTE DISTRESS PATIENT STILL HAS BOUTS OF DEPRESSION OCCASIONALLY PATIENT'S HAS A HISTORY OF HYPERTENSION    VITALS REVIEWED AND THEY ARE STABLE    LUNGS ARE CLEAR NO CRACKLES OR WHEEZING CARDIOVASCULAR S1-S2 REGULAR RATE RHYTHM ABDOMEN IS SOFT POSITIVE BOWEL SOUNDS NONTENDER EXTREMITIES NONEDEMATOUS NEURO NO FOCAL NEUROLOGICAL DEFICITS SKIN IS WARM AND DRY    DEPRESSION AND HYPERTENSION THE PLAN REFILLED HIS LEXAPRO AND LISINOPRIL.  Review of Systems      Objective:      Physical Exam    Assessment:       Problem List Items Addressed This Visit    None     Visit Diagnoses     Encounter for administration of vaccine    -  Primary    Relevant Orders    Influenza - Quadrivalent - High Dose (65+) (PF) (IM) (Completed)    Hypertension, unspecified type   (Stable)      Relevant Medications    lisinopriL (PRINIVIL,ZESTRIL) 40 MG tablet    Anxiety   (Active)      Relevant Medications    EScitalopram oxalate (LEXAPRO) 20 MG tablet          Plan:

## 2022-01-19 ENCOUNTER — OFFICE VISIT (OUTPATIENT)
Dept: FAMILY MEDICINE | Facility: CLINIC | Age: 72
End: 2022-01-19
Payer: MEDICARE

## 2022-01-19 DIAGNOSIS — I10 ESSENTIAL HYPERTENSION: Primary | Chronic | ICD-10-CM

## 2022-01-19 DIAGNOSIS — F32.A DEPRESSION, UNSPECIFIED DEPRESSION TYPE: ICD-10-CM

## 2022-01-19 DIAGNOSIS — N40.0 BENIGN PROSTATIC HYPERPLASIA, UNSPECIFIED WHETHER LOWER URINARY TRACT SYMPTOMS PRESENT: Chronic | ICD-10-CM

## 2022-01-19 PROCEDURE — 99214 PR OFFICE/OUTPT VISIT, EST, LEVL IV, 30-39 MIN: ICD-10-PCS | Mod: ,,, | Performed by: INTERNAL MEDICINE

## 2022-01-19 PROCEDURE — 99214 OFFICE O/P EST MOD 30 MIN: CPT | Mod: ,,, | Performed by: INTERNAL MEDICINE

## 2022-01-19 RX ORDER — DULOXETIN HYDROCHLORIDE 60 MG/1
60 CAPSULE, DELAYED RELEASE ORAL 2 TIMES DAILY
Qty: 60 CAPSULE | Refills: 3 | Status: SHIPPED | OUTPATIENT
Start: 2022-01-19 | End: 2022-06-22 | Stop reason: SDUPTHER

## 2022-01-19 RX ORDER — TAMSULOSIN HYDROCHLORIDE 0.4 MG/1
0.4 CAPSULE ORAL DAILY
Qty: 90 CAPSULE | Refills: 1 | Status: CANCELLED | OUTPATIENT
Start: 2022-01-19 | End: 2022-07-18

## 2022-01-19 RX ORDER — TAMSULOSIN HYDROCHLORIDE 0.4 MG/1
0.4 CAPSULE ORAL DAILY
Qty: 90 CAPSULE | Refills: 1 | Status: SHIPPED | OUTPATIENT
Start: 2022-01-19 | End: 2022-08-01 | Stop reason: SDUPTHER

## 2022-01-20 NOTE — PROGRESS NOTES
Subjective:       Patient ID: Chano Clement is a 71 y.o. male.    Chief Complaint: Follow-up and Medication Refill    Patient is here for follow up evaluation. Blood pressure is 136/66 today. Patient states he is doing good and feeling well. Will refill medications today. Discussed importance of diet and exercise to maintain optimal health. Follow up in 4 months.       Current Medications:    Current Outpatient Medications:     diclofenac sodium (VOLTAREN) 1 % Gel, Apply to back four times daily as needed for pain, Disp: 3000 g, Rfl: 3    DULoxetine (CYMBALTA) 60 MG capsule, Take 1 capsule (60 mg total) by mouth 2 (two) times daily., Disp: 60 capsule, Rfl: 3    dutasteride (AVODART) 0.5 mg capsule, Take 1 capsule (0.5 mg total) by mouth once daily., Disp: 30 capsule, Rfl: 2    EScitalopram oxalate (LEXAPRO) 20 MG tablet, Take 1 tablet (20 mg total) by mouth once daily., Disp: 90 tablet, Rfl: 1    ezetimibe (ZETIA) 10 mg tablet, Take 1 tablet (10 mg total) by mouth once daily., Disp: 30 tablet, Rfl: 3    gabapentin (NEURONTIN) 600 MG tablet, Take 2 tablets (1,200 mg total) by mouth 2 (two) times daily., Disp: 360 tablet, Rfl: 0    HYDROcodone-acetaminophen (NORCO)  mg per tablet, Take 1 tablet by mouth 3 (three) times daily as needed for Pain., Disp: 90 tablet, Rfl: 0    lisinopriL (PRINIVIL,ZESTRIL) 40 MG tablet, Take 1 tablet (40 mg total) by mouth once daily., Disp: 90 tablet, Rfl: 1    loratadine (CLARITIN) 10 mg tablet, Take 1 tablet (10 mg total) by mouth once daily., Disp: 90 tablet, Rfl: 1    pramipexole (MIRAPEX) 0.25 MG tablet, Take 1 tablet (0.25 mg total) by mouth 2 (two) times daily., Disp: 90 tablet, Rfl: 1    tamsulosin (FLOMAX) 0.4 mg Cap, Take 1 capsule (0.4 mg total) by mouth once daily., Disp: 90 capsule, Rfl: 1    traZODone (DESYREL) 100 MG tablet, Take 2 tablets (200 mg total) by mouth every evening., Disp: 30 tablet, Rfl: 3    Last Labs:     No visits with results within 1  Month(s) from this visit.   Latest known visit with results is:   Office Visit on 07/23/2021   Component Date Value    Triglycerides 07/23/2021 88     Cholesterol 07/23/2021 186     HDL Cholesterol 07/23/2021 61*    Cholesterol/HDL Ratio (R* 07/23/2021 3.0     Non-HDL 07/23/2021 125     LDL Calculated 07/23/2021 107     LDL/HDL 07/23/2021 1.8     VLDL 07/23/2021 18     Sodium 07/23/2021 137     Potassium 07/23/2021 4.5     Chloride 07/23/2021 105     CO2 07/23/2021 26     Anion Gap 07/23/2021 11     Glucose 07/23/2021 152*    BUN 07/23/2021 20*    Creatinine 07/23/2021 1.06     BUN/Creatinine Ratio 07/23/2021 19     Calcium 07/23/2021 8.8     Total Protein 07/23/2021 7.5     Albumin 07/23/2021 3.8     Globulin 07/23/2021 3.7     A/G Ratio 07/23/2021 1.0     Bilirubin, Total 07/23/2021 0.5     Alk Phos 07/23/2021 84     ALT 07/23/2021 24     AST 07/23/2021 10*    eGFR 07/23/2021 73     PSA Total 07/23/2021 1.220     WBC 07/23/2021 8.78     RBC 07/23/2021 4.55*    Hemoglobin 07/23/2021 13.4*    Hematocrit 07/23/2021 42.1     MCV 07/23/2021 92.5     MCH 07/23/2021 29.5     MCHC 07/23/2021 31.8*    RDW 07/23/2021 15.3*    Platelet Count 07/23/2021 282     MPV 07/23/2021 11.2     Neutrophils % 07/23/2021 64.1     Lymphocytes % 07/23/2021 26.1*    Monocytes % 07/23/2021 7.1*    Eosinophils % 07/23/2021 1.8     Basophils % 07/23/2021 0.6     Immature Granulocytes % 07/23/2021 0.3     nRBC, Auto 07/23/2021 0.0     Neutrophils, Abs 07/23/2021 5.63     Lymphocytes, Absolute 07/23/2021 2.29     Monocytes, Absolute 07/23/2021 0.62     Eosinophils, Absolute 07/23/2021 0.16     Basophils, Absolute 07/23/2021 0.05     Immature Granulocytes, A* 07/23/2021 0.03     nRBC, Absolute 07/23/2021 0.00     Diff Type 07/23/2021 Auto        Last Imaging:  CT Lumbar Spine Without Contrast  Narrative: EXAMINATION:  CT LUMBAR SPINE WITHOUT CONTRAST    CLINICAL HISTORY:  Scoliosis,  unspecified    TECHNIQUE:  Low-dose axial, sagittal and coronal reformations are obtained through the lumbar spine.  Contrast was not administered.    COMPARISON:  CT of the abdomen and pelvis 05/25/2019    FINDINGS:  [place] left scoliotic curvature of the lumbar spine.  There is lateral subluxation of L3 on L4.  Degenerative endplate changes throughout.  Chronic superior endplate compression deformity of L1 similar prior studies.  No acute fracture detected.    T11-12: Disc bulging eccentric to the left.  Mild spinal canal stenosis.  Moderate left and right foraminal stenosis.    T12-L1: Disc bulge and facet degeneration.  Mild spinal canal stenosis.  Moderate right and mild left foraminal stenosis.    L1-L2: Disc bulge and facet degeneration.  Mild spinal canal stenosis.  Moderate right and mild left foraminal stenosis.    L2-3: Disc osteophyte complex.  Facet degeneration.  Mild to moderate spinal canal stenosis.  Moderate bilateral foraminal stenosis.    L3-4: Disc bulge and facet degeneration.  Moderate spinal canal stenosis.  Mild-to-moderate bilateral foraminal stenosis.    L4-5: Disc bulge and facet degeneration.  Moderate spinal canal stenosis.  Moderate bilateral foraminal stenosis.    L5-S1: Disc bulging and osteophytes.  Facet degeneration.  Mild spinal canal stenosis.  Moderate to severe left and moderate right foraminal stenosis.  Impression: Multilevel moderately advanced degenerative changes of the lumbar spine.    Electronically signed by: Segundo Hoover  Date:    12/13/2021  Time:    10:58         Review of Systems   All other systems reviewed and are negative.        Objective:      Physical Exam  Vitals reviewed.   Constitutional:       Appearance: Normal appearance. He is obese.   HENT:      Right Ear: Tympanic membrane normal.      Left Ear: Tympanic membrane normal.      Nose: Nose normal.      Mouth/Throat:      Mouth: Mucous membranes are moist.   Eyes:      Pupils: Pupils are equal, round, and  reactive to light.   Cardiovascular:      Rate and Rhythm: Normal rate and regular rhythm.      Pulses: Normal pulses.      Heart sounds: Normal heart sounds.   Pulmonary:      Effort: Pulmonary effort is normal.      Breath sounds: Normal breath sounds.   Abdominal:      General: Abdomen is flat. Bowel sounds are normal.      Palpations: Abdomen is soft.   Musculoskeletal:         General: Normal range of motion.      Cervical back: Normal range of motion and neck supple.   Skin:     General: Skin is warm and dry.   Neurological:      General: No focal deficit present.      Mental Status: He is alert and oriented to person, place, and time. Mental status is at baseline.         Assessment:       1. Essential hypertension      Stable   2. Depression, unspecified depression type  DULoxetine (CYMBALTA) 60 MG capsule    Stable   3. Benign prostatic hyperplasia, unspecified whether lower urinary tract symptoms present      Stable        Plan:         Chano was seen today for follow-up and medication refill.    Diagnoses and all orders for this visit:    Essential hypertension  Comments:  Stable    Depression, unspecified depression type  Comments:  Stable  Orders:  -     DULoxetine (CYMBALTA) 60 MG capsule; Take 1 capsule (60 mg total) by mouth 2 (two) times daily.    Benign prostatic hyperplasia, unspecified whether lower urinary tract symptoms present  Comments:  Stable    Other orders  -     tamsulosin (FLOMAX) 0.4 mg Cap; Take 1 capsule (0.4 mg total) by mouth once daily.       Follow up in 4 months.

## 2022-01-20 NOTE — PATIENT INSTRUCTIONS
Chano was seen today for follow-up and medication refill.    Diagnoses and all orders for this visit:    Essential hypertension  Comments:  Stable    Depression, unspecified depression type  Comments:  Stable  Orders:  -     DULoxetine (CYMBALTA) 60 MG capsule; Take 1 capsule (60 mg total) by mouth 2 (two) times daily.    Benign prostatic hyperplasia, unspecified whether lower urinary tract symptoms present  Comments:  Stable    Other orders  -     tamsulosin (FLOMAX) 0.4 mg Cap; Take 1 capsule (0.4 mg total) by mouth once daily.

## 2022-01-31 RX ORDER — EZETIMIBE 10 MG/1
10 TABLET ORAL DAILY
Qty: 90 TABLET | Refills: 1 | Status: SHIPPED | OUTPATIENT
Start: 2022-01-31 | End: 2022-08-01 | Stop reason: SDUPTHER

## 2022-01-31 NOTE — TELEPHONE ENCOUNTER
----- Message from Yamileth Robb sent at 1/31/2022  9:41 AM CST -----  Regarding: PATIENT NEEDS REFILL  Patient needs someone to call so he can get a refill.  [phone]

## 2022-01-31 NOTE — TELEPHONE ENCOUNTER
Call returned to pt. He needs on 90 day refill on his Zetia sent to [facility].

## 2022-02-24 ENCOUNTER — OFFICE VISIT (OUTPATIENT)
Dept: FAMILY MEDICINE | Facility: CLINIC | Age: 72
End: 2022-02-24
Payer: MEDICARE

## 2022-02-24 VITALS
SYSTOLIC BLOOD PRESSURE: 136 MMHG | TEMPERATURE: 100 F | BODY MASS INDEX: 35.33 KG/M2 | HEART RATE: 92 BPM | RESPIRATION RATE: 20 BRPM | HEIGHT: 70 IN | OXYGEN SATURATION: 94 % | DIASTOLIC BLOOD PRESSURE: 59 MMHG | WEIGHT: 246.81 LBS

## 2022-02-24 DIAGNOSIS — J06.9 UPPER RESPIRATORY TRACT INFECTION, UNSPECIFIED TYPE: ICD-10-CM

## 2022-02-24 DIAGNOSIS — R52 BODY ACHES: ICD-10-CM

## 2022-02-24 DIAGNOSIS — R68.83 CHILLS (WITHOUT FEVER): ICD-10-CM

## 2022-02-24 DIAGNOSIS — R05.8 PRODUCTIVE COUGH: ICD-10-CM

## 2022-02-24 DIAGNOSIS — R14.0 ABDOMINAL DISTENSION: ICD-10-CM

## 2022-02-24 DIAGNOSIS — M51.36 BULGING LUMBAR DISC: ICD-10-CM

## 2022-02-24 DIAGNOSIS — Z79.899 OTHER LONG TERM (CURRENT) DRUG THERAPY: ICD-10-CM

## 2022-02-24 DIAGNOSIS — M54.50 CHRONIC BILATERAL LOW BACK PAIN WITHOUT SCIATICA: ICD-10-CM

## 2022-02-24 DIAGNOSIS — J02.9 SORE THROAT: Primary | ICD-10-CM

## 2022-02-24 DIAGNOSIS — F33.1 MODERATE EPISODE OF RECURRENT MAJOR DEPRESSIVE DISORDER: ICD-10-CM

## 2022-02-24 DIAGNOSIS — G89.29 CHRONIC BILATERAL LOW BACK PAIN WITHOUT SCIATICA: ICD-10-CM

## 2022-02-24 DIAGNOSIS — K59.09 OTHER CONSTIPATION: ICD-10-CM

## 2022-02-24 LAB
ALBUMIN SERPL BCP-MCNC: 3.5 G/DL (ref 3.5–5)
ALP SERPL-CCNC: 83 U/L (ref 45–115)
ALT SERPL W P-5'-P-CCNC: 29 U/L (ref 16–61)
ANION GAP SERPL CALCULATED.3IONS-SCNC: 10 MMOL/L (ref 7–16)
AST SERPL W P-5'-P-CCNC: 13 U/L (ref 15–37)
BASOPHILS # BLD AUTO: 0.03 K/UL (ref 0–0.2)
BASOPHILS NFR BLD AUTO: 0.4 % (ref 0–1)
BILIRUB DIRECT SERPL-MCNC: <0.1 MG/DL (ref 0–0.2)
BILIRUB SERPL-MCNC: 0.3 MG/DL (ref 0–1.2)
BUN SERPL-MCNC: 14 MG/DL (ref 7–18)
BUN/CREAT SERPL: 14 (ref 6–20)
CALCIUM SERPL-MCNC: 8.4 MG/DL (ref 8.5–10.1)
CHLORIDE SERPL-SCNC: 106 MMOL/L (ref 98–107)
CO2 SERPL-SCNC: 26 MMOL/L (ref 21–32)
CREAT SERPL-MCNC: 0.99 MG/DL (ref 0.7–1.3)
CTP QC/QA: YES
CTP QC/QA: YES
DIFFERENTIAL METHOD BLD: ABNORMAL
EOSINOPHIL # BLD AUTO: 0.15 K/UL (ref 0–0.5)
EOSINOPHIL NFR BLD AUTO: 2 % (ref 1–4)
ERYTHROCYTE [DISTWIDTH] IN BLOOD BY AUTOMATED COUNT: 14.7 % (ref 11.5–14.5)
FLUAV AG NPH QL: NEGATIVE
FLUBV AG NPH QL: NEGATIVE
GLUCOSE SERPL-MCNC: 109 MG/DL (ref 74–106)
HCT VFR BLD AUTO: 40.1 % (ref 40–54)
HGB BLD-MCNC: 13.5 G/DL (ref 13.5–18)
IMM GRANULOCYTES # BLD AUTO: 0.03 K/UL (ref 0–0.04)
IMM GRANULOCYTES NFR BLD: 0.4 % (ref 0–0.4)
LYMPHOCYTES # BLD AUTO: 1.61 K/UL (ref 1–4.8)
LYMPHOCYTES NFR BLD AUTO: 21.1 % (ref 27–41)
MCH RBC QN AUTO: 30.2 PG (ref 27–31)
MCHC RBC AUTO-ENTMCNC: 33.7 G/DL (ref 32–36)
MCV RBC AUTO: 89.7 FL (ref 80–96)
MONOCYTES # BLD AUTO: 1.58 K/UL (ref 0–0.8)
MONOCYTES NFR BLD AUTO: 20.7 % (ref 2–6)
MPC BLD CALC-MCNC: 11.4 FL (ref 9.4–12.4)
NEUTROPHILS # BLD AUTO: 4.23 K/UL (ref 1.8–7.7)
NEUTROPHILS NFR BLD AUTO: 55.4 % (ref 53–65)
NRBC # BLD AUTO: 0 X10E3/UL
NRBC, AUTO (.00): 0 %
PLATELET # BLD AUTO: 231 K/UL (ref 150–400)
POTASSIUM SERPL-SCNC: 3.6 MMOL/L (ref 3.5–5.1)
PROT SERPL-MCNC: 7 G/DL (ref 6.4–8.2)
RBC # BLD AUTO: 4.47 M/UL (ref 4.6–6.2)
S PYO RRNA THROAT QL PROBE: NEGATIVE
SARS-COV-2 AG RESP QL IA.RAPID: NEGATIVE
SODIUM SERPL-SCNC: 138 MMOL/L (ref 136–145)
VIT B12 SERPL-MCNC: 669 PG/ML (ref 193–986)
WBC # BLD AUTO: 7.63 K/UL (ref 4.5–11)

## 2022-02-24 PROCEDURE — 85025 COMPLETE CBC W/AUTO DIFF WBC: CPT | Mod: ,,, | Performed by: CLINICAL MEDICAL LABORATORY

## 2022-02-24 PROCEDURE — 80053 COMPREHEN METABOLIC PANEL: CPT | Mod: ,,, | Performed by: CLINICAL MEDICAL LABORATORY

## 2022-02-24 PROCEDURE — 82607 VITAMIN B-12: CPT | Mod: ,,, | Performed by: CLINICAL MEDICAL LABORATORY

## 2022-02-24 PROCEDURE — 80053 BASIC METABOLIC PANEL: ICD-10-PCS | Mod: ,,, | Performed by: CLINICAL MEDICAL LABORATORY

## 2022-02-24 PROCEDURE — 82607 VITAMIN B12: ICD-10-PCS | Mod: ,,, | Performed by: CLINICAL MEDICAL LABORATORY

## 2022-02-24 PROCEDURE — 87428 SARSCOV & INF VIR A&B AG IA: CPT | Mod: RHCUB | Performed by: INTERNAL MEDICINE

## 2022-02-24 PROCEDURE — 82248 BILIRUBIN DIRECT: CPT | Mod: ,,, | Performed by: CLINICAL MEDICAL LABORATORY

## 2022-02-24 PROCEDURE — 82248 HEPATIC FUNCTION PANEL: ICD-10-PCS | Mod: ,,, | Performed by: CLINICAL MEDICAL LABORATORY

## 2022-02-24 PROCEDURE — 99214 PR OFFICE/OUTPT VISIT, EST, LEVL IV, 30-39 MIN: ICD-10-PCS | Mod: ,,, | Performed by: INTERNAL MEDICINE

## 2022-02-24 PROCEDURE — 85025 CBC WITH DIFFERENTIAL: ICD-10-PCS | Mod: ,,, | Performed by: CLINICAL MEDICAL LABORATORY

## 2022-02-24 PROCEDURE — 99214 OFFICE O/P EST MOD 30 MIN: CPT | Mod: ,,, | Performed by: INTERNAL MEDICINE

## 2022-02-24 PROCEDURE — 87880 STREP A ASSAY W/OPTIC: CPT | Mod: RHCUB | Performed by: INTERNAL MEDICINE

## 2022-02-24 RX ORDER — BENZONATATE 100 MG/1
100 CAPSULE ORAL 3 TIMES DAILY PRN
Qty: 30 CAPSULE | Refills: 0 | Status: CANCELLED | OUTPATIENT
Start: 2022-02-24 | End: 2022-03-06

## 2022-02-24 RX ORDER — METHOCARBAMOL 500 MG/1
TABLET, FILM COATED ORAL
COMMUNITY
Start: 2021-09-02 | End: 2022-03-14

## 2022-02-24 RX ORDER — GLYCERIN 0.25 %
1 DROPS OPHTHALMIC (EYE) 2 TIMES DAILY
Qty: 30 ML | Refills: 0 | Status: SHIPPED | OUTPATIENT
Start: 2022-02-24 | End: 2022-04-12

## 2022-02-24 RX ORDER — NYSTATIN 100000 [USP'U]/ML
SUSPENSION ORAL
COMMUNITY
Start: 2021-06-01 | End: 2022-03-14

## 2022-02-24 RX ORDER — TRAZODONE HYDROCHLORIDE 100 MG/1
200 TABLET ORAL NIGHTLY
Qty: 30 TABLET | Refills: 3 | Status: SHIPPED | OUTPATIENT
Start: 2022-02-24 | End: 2022-04-05 | Stop reason: SDUPTHER

## 2022-02-24 RX ORDER — CYCLOBENZAPRINE HCL 10 MG
10 TABLET ORAL DAILY PRN
COMMUNITY
Start: 2021-12-02 | End: 2023-11-14 | Stop reason: SDUPTHER

## 2022-02-24 RX ORDER — AZITHROMYCIN 250 MG/1
TABLET, FILM COATED ORAL
Qty: 6 TABLET | Refills: 0 | Status: SHIPPED | OUTPATIENT
Start: 2022-02-24 | End: 2022-03-01

## 2022-02-24 RX ORDER — DOCUSATE SODIUM 100 MG/1
100 CAPSULE, LIQUID FILLED ORAL 2 TIMES DAILY
Qty: 60 CAPSULE | Refills: 1 | Status: SHIPPED | OUTPATIENT
Start: 2022-02-24 | End: 2023-11-14

## 2022-02-24 NOTE — PROGRESS NOTES
Subjective:       Patient ID: Chano Clement is a 71 y.o. male.    Chief Complaint: Fatigue, Generalized Body Aches, Fever, Sore Throat, Cough, Headache, and Nasal Congestion    Patient is here today for check up evaluation. Patient is complaining of weakness, headache, sore throat, productive cough, nasal congestion, body aches, and bilateral eye pain. Patient states symptoms started yesterday. States has been unable to sleep due to feeling sore all over. Patient was tested for COVID, Flu, and strep and found to be negative. Patient states is currently taking Mucinex but does not seem to help much. Will order Z-pack, chloraseptic spray BID, and Tessalon Perles 100 mg PO TID prn #30. Patient is currently taking Cymbalta and Lexapro. Will need to stop taking one. Patient states he has been taking both for multiple years and is unsure which one works better. States he does have refill of Cymbalta left so will stop Lexapro. Patient will need to ween off Lexapro over the next few weeks taking 1 every other day for 1 week then 1 every 3 days for 3 doses. Patient does follow with Psychiatrist currently. States was sent to psychiatry by pain treatment. Follows with pain treatment for chronic lower back pain from multi herniated discs with pinched nerves. States affects his left leg causing weakness and pain. Patient abdomen is distended upon exam and states he does have occasional constipation. Will prescribe Colase 100mg PO BID #60 to help. Will order lab work also. Will follow in 2 weeks.       Current Medications:    Current Outpatient Medications:     cyclobenzaprine (FLEXERIL) 10 MG tablet, Take 10 mg by mouth every evening., Disp: , Rfl:     diclofenac sodium (VOLTAREN) 1 % Gel, Apply to back four times daily as needed for pain, Disp: 3000 g, Rfl: 3    DULoxetine (CYMBALTA) 60 MG capsule, Take 1 capsule (60 mg total) by mouth 2 (two) times daily., Disp: 60 capsule, Rfl: 3    EScitalopram oxalate (LEXAPRO) 20  MG tablet, Take 1 tablet (20 mg total) by mouth once daily., Disp: 90 tablet, Rfl: 1    ezetimibe (ZETIA) 10 mg tablet, Take 1 tablet (10 mg total) by mouth once daily., Disp: 90 tablet, Rfl: 1    gabapentin (NEURONTIN) 600 MG tablet, Take 2 tablets (1,200 mg total) by mouth 2 (two) times daily., Disp: 360 tablet, Rfl: 0    HYDROcodone-acetaminophen (NORCO)  mg per tablet, Take 1 tablet by mouth 3 (three) times daily as needed for Pain., Disp: 90 tablet, Rfl: 0    lisinopriL (PRINIVIL,ZESTRIL) 40 MG tablet, Take 1 tablet (40 mg total) by mouth once daily., Disp: 90 tablet, Rfl: 1    loratadine (CLARITIN) 10 mg tablet, Take 1 tablet (10 mg total) by mouth once daily., Disp: 90 tablet, Rfl: 1    methocarbamoL (ROBAXIN) 500 MG Tab, , Disp: , Rfl:     nystatin (MYCOSTATIN) 100,000 unit/mL suspension, , Disp: , Rfl:     pramipexole (MIRAPEX) 0.25 MG tablet, Take 1 tablet (0.25 mg total) by mouth 2 (two) times daily., Disp: 90 tablet, Rfl: 1    tamsulosin (FLOMAX) 0.4 mg Cap, Take 1 capsule (0.4 mg total) by mouth once daily., Disp: 90 capsule, Rfl: 1    azithromycin (Z-ANGELIC) 250 MG tablet, Take 2 tablets by mouth on day 1; Take 1 tablet by mouth on days 2-5, Disp: 6 tablet, Rfl: 0    docusate sodium (COLACE) 100 MG capsule, Take 1 capsule (100 mg total) by mouth 2 (two) times daily., Disp: 60 capsule, Rfl: 1    dutasteride (AVODART) 0.5 mg capsule, Take 1 capsule (0.5 mg total) by mouth once daily., Disp: 30 capsule, Rfl: 2    phenoL-glycerin (CHLORASEPTIC MAX SORE THROAT) 1.5-33 % Spry, 1 spray by Mucous Membrane route 2 (two) times a day., Disp: 30 mL, Rfl: 0    traZODone (DESYREL) 100 MG tablet, Take 2 tablets (200 mg total) by mouth every evening., Disp: 30 tablet, Rfl: 3    Last Labs:     Office Visit on 02/24/2022   Component Date Value    SARS Coronavirus 2 Antig* 02/24/2022 Negative     Rapid Influenza A Ag 02/24/2022 Negative     Rapid Influenza B Ag 02/24/2022 Negative       Acceptab* 02/24/2022 Yes     Rapid Strep A Screen 02/24/2022 Negative      Acceptab* 02/24/2022 Yes        Last Imaging:  X-Ray KUB  Narrative: EXAMINATION:  XR KUB    CLINICAL HISTORY:  Abdominal distension (gaseous)    COMPARISON:  None    TECHNIQUE:  Frontal views of the abdomen.    FINDINGS:  Nonspecific bowel gas pattern.  4 mm phlebolith versus distal left ureteral calculus within the left pelvis.  Levoconvex curvature of the lumbar spine with degenerative change.  Impression: As above.    Point of Service: [facility]    Electronically signed by: Kayden Cobb  Date:    02/24/2022  Time:    10:20         Review of Systems   Constitutional: Positive for fatigue.   HENT: Positive for nasal congestion, sinus pressure/congestion and sore throat.    Respiratory: Positive for cough and shortness of breath.    Gastrointestinal: Positive for abdominal distention and constipation.   Neurological: Positive for weakness.   All other systems reviewed and are negative.        Objective:      Physical Exam  Vitals reviewed.   Constitutional:       Appearance: Normal appearance.   Cardiovascular:      Rate and Rhythm: Normal rate and regular rhythm.      Pulses: Normal pulses.      Heart sounds: Normal heart sounds.   Pulmonary:      Effort: Pulmonary effort is normal.      Breath sounds: Normal breath sounds.   Abdominal:      General: Bowel sounds are normal. There is distension.   Musculoskeletal:         General: Normal range of motion.      Cervical back: Normal range of motion and neck supple.   Skin:     General: Skin is warm and dry.   Neurological:      General: No focal deficit present.      Mental Status: He is alert and oriented to person, place, and time. Mental status is at baseline.         Assessment:       1. Sore throat  POCT rapid strep A   2. Body aches  POCT SARS-COV2 (COVID) with Flu Antigen    Basic Metabolic Panel    CBC Auto Differential    Vitamin B12    Basic Metabolic  Panel    CBC Auto Differential    Vitamin B12   3. Chills (without fever)   POCT SARS-COV2 (COVID) with Flu Antigen    Basic Metabolic Panel    CBC Auto Differential    Vitamin B12    Basic Metabolic Panel    CBC Auto Differential    Vitamin B12   4. Productive cough     5. Abdominal distension  Hepatic Function Panel    X-Ray KUB    Hepatic Function Panel   6. Other constipation  Hepatic Function Panel    X-Ray KUB    Hepatic Function Panel   7. Chronic bilateral low back pain without sciatica     8. Other long term (current) drug therapy   Vitamin B12    Vitamin B12   9. Moderate episode of recurrent major depressive disorder     10. Bulging lumbar disc     11. Upper respiratory tract infection, unspecified type          Plan:         Chano was seen today for fatigue, generalized body aches, fever, sore throat, cough, headache and nasal congestion.    Diagnoses and all orders for this visit:    Sore throat  -     POCT rapid strep A    Body aches  -     POCT SARS-COV2 (COVID) with Flu Antigen  -     Basic Metabolic Panel; Future  -     CBC Auto Differential; Future  -     Vitamin B12; Future  -     Basic Metabolic Panel  -     CBC Auto Differential  -     Vitamin B12    Chills (without fever)   -     POCT SARS-COV2 (COVID) with Flu Antigen  -     Basic Metabolic Panel; Future  -     CBC Auto Differential; Future  -     Vitamin B12; Future  -     Basic Metabolic Panel  -     CBC Auto Differential  -     Vitamin B12    Productive cough    Abdominal distension  -     Hepatic Function Panel; Future  -     X-Ray KUB; Future  -     Hepatic Function Panel    Other constipation  -     Hepatic Function Panel; Future  -     X-Ray KUB; Future  -     Hepatic Function Panel    Chronic bilateral low back pain without sciatica    Other long term (current) drug therapy   -     Vitamin B12; Future  -     Vitamin B12    Moderate episode of recurrent major depressive disorder    Bulging lumbar disc    Upper respiratory tract  infection, unspecified type    Other orders  -     traZODone (DESYREL) 100 MG tablet; Take 2 tablets (200 mg total) by mouth every evening.  -     azithromycin (Z-ANGELIC) 250 MG tablet; Take 2 tablets by mouth on day 1; Take 1 tablet by mouth on days 2-5  -     phenoL-glycerin (CHLORASEPTIC MAX SORE THROAT) 1.5-33 % Spry; 1 spray by Mucous Membrane route 2 (two) times a day.  -     docusate sodium (COLACE) 100 MG capsule; Take 1 capsule (100 mg total) by mouth 2 (two) times daily.  The following orders have not been finalized:  -     Cancel: benzonatate (TESSALON) 100 MG capsule

## 2022-02-24 NOTE — PATIENT INSTRUCTIONS
Chano was seen today for fatigue, generalized body aches, fever, sore throat, cough, headache and nasal congestion.    Diagnoses and all orders for this visit:    Sore throat  -     POCT rapid strep A    Body aches  -     POCT SARS-COV2 (COVID) with Flu Antigen  -     Basic Metabolic Panel; Future  -     CBC Auto Differential; Future  -     Vitamin B12; Future  -     Basic Metabolic Panel  -     CBC Auto Differential  -     Vitamin B12    Chills (without fever)   -     POCT SARS-COV2 (COVID) with Flu Antigen  -     Basic Metabolic Panel; Future  -     CBC Auto Differential; Future  -     Vitamin B12; Future  -     Basic Metabolic Panel  -     CBC Auto Differential  -     Vitamin B12    Productive cough    Abdominal distension  -     Hepatic Function Panel; Future  -     X-Ray KUB; Future  -     Hepatic Function Panel    Other constipation  -     Hepatic Function Panel; Future  -     X-Ray KUB; Future  -     Hepatic Function Panel    Chronic bilateral low back pain without sciatica    Other long term (current) drug therapy   -     Vitamin B12; Future  -     Vitamin B12    Moderate episode of recurrent major depressive disorder    Bulging lumbar disc    Upper respiratory tract infection, unspecified type    Other orders  -     traZODone (DESYREL) 100 MG tablet; Take 2 tablets (200 mg total) by mouth every evening.  -     azithromycin (Z-ANGELIC) 250 MG tablet; Take 2 tablets by mouth on day 1; Take 1 tablet by mouth on days 2-5  -     phenoL-glycerin (CHLORASEPTIC MAX SORE THROAT) 1.5-33 % Spry; 1 spray by Mucous Membrane route 2 (two) times a day.  -     docusate sodium (COLACE) 100 MG capsule; Take 1 capsule (100 mg total) by mouth 2 (two) times daily.  The following orders have not been finalized:  -     Cancel: benzonatate (TESSALON) 100 MG capsule

## 2022-03-08 ENCOUNTER — TELEPHONE (OUTPATIENT)
Dept: FAMILY MEDICINE | Facility: CLINIC | Age: 72
End: 2022-03-08
Payer: MEDICARE

## 2022-03-08 NOTE — TELEPHONE ENCOUNTER
----- Message from Josiane Ceron sent at 3/7/2022 11:19 AM CST -----  Patient called requesting medication from 2/24 visit to be sent in to the [facility] pharmacy        [phone]     Called patient-he says he was ordered an expectorant. No med seen. Informed patient he can come in to be seen today. He says he will wait to see how he is in the morning first. Patient asked if there is any med he can take. Informed him he can choose one but I can not give order for a medication. Voiced understanding.

## 2022-03-11 DIAGNOSIS — Z71.89 COMPLEX CARE COORDINATION: ICD-10-CM

## 2022-03-14 ENCOUNTER — OFFICE VISIT (OUTPATIENT)
Dept: FAMILY MEDICINE | Facility: CLINIC | Age: 72
End: 2022-03-14
Payer: MEDICARE

## 2022-03-14 VITALS
OXYGEN SATURATION: 97 % | HEIGHT: 69 IN | RESPIRATION RATE: 20 BRPM | HEART RATE: 85 BPM | DIASTOLIC BLOOD PRESSURE: 64 MMHG | BODY MASS INDEX: 36.14 KG/M2 | TEMPERATURE: 98 F | WEIGHT: 244 LBS | SYSTOLIC BLOOD PRESSURE: 118 MMHG

## 2022-03-14 DIAGNOSIS — J01.00 ACUTE NON-RECURRENT MAXILLARY SINUSITIS: ICD-10-CM

## 2022-03-14 DIAGNOSIS — J01.00 ACUTE NON-RECURRENT MAXILLARY SINUSITIS: Primary | ICD-10-CM

## 2022-03-14 DIAGNOSIS — J20.9 ACUTE BRONCHITIS, UNSPECIFIED ORGANISM: ICD-10-CM

## 2022-03-14 PROCEDURE — 96372 THER/PROPH/DIAG INJ SC/IM: CPT | Mod: ,,, | Performed by: NURSE PRACTITIONER

## 2022-03-14 PROCEDURE — 96372 PR INJECTION,THERAP/PROPH/DIAG2ST, IM OR SUBCUT: ICD-10-PCS | Mod: ,,, | Performed by: NURSE PRACTITIONER

## 2022-03-14 PROCEDURE — 99213 PR OFFICE/OUTPT VISIT, EST, LEVL III, 20-29 MIN: ICD-10-PCS | Mod: ,,, | Performed by: NURSE PRACTITIONER

## 2022-03-14 PROCEDURE — 99213 OFFICE O/P EST LOW 20 MIN: CPT | Mod: ,,, | Performed by: NURSE PRACTITIONER

## 2022-03-14 RX ORDER — DEXAMETHASONE SODIUM PHOSPHATE 4 MG/ML
6 INJECTION, SOLUTION INTRA-ARTICULAR; INTRALESIONAL; INTRAMUSCULAR; INTRAVENOUS; SOFT TISSUE
Status: COMPLETED | OUTPATIENT
Start: 2022-03-14 | End: 2022-03-14

## 2022-03-14 RX ORDER — ALBUTEROL SULFATE 90 UG/1
2 AEROSOL, METERED RESPIRATORY (INHALATION) EVERY 4 HOURS PRN
Qty: 18 G | Refills: 1 | Status: SHIPPED | OUTPATIENT
Start: 2022-03-14 | End: 2022-03-14 | Stop reason: SDUPTHER

## 2022-03-14 RX ORDER — ALBUTEROL SULFATE 90 UG/1
2 AEROSOL, METERED RESPIRATORY (INHALATION) EVERY 4 HOURS PRN
Qty: 18 G | Refills: 1 | Status: SHIPPED | OUTPATIENT
Start: 2022-03-14

## 2022-03-14 RX ORDER — AMOXICILLIN AND CLAVULANATE POTASSIUM 875; 125 MG/1; MG/1
1 TABLET, FILM COATED ORAL EVERY 12 HOURS
Qty: 20 TABLET | Refills: 0 | Status: SHIPPED | OUTPATIENT
Start: 2022-03-14 | End: 2022-03-14 | Stop reason: SDUPTHER

## 2022-03-14 RX ORDER — AMOXICILLIN AND CLAVULANATE POTASSIUM 875; 125 MG/1; MG/1
1 TABLET, FILM COATED ORAL EVERY 12 HOURS
Qty: 20 TABLET | Refills: 0 | Status: SHIPPED | OUTPATIENT
Start: 2022-03-14 | End: 2022-03-24

## 2022-03-14 RX ORDER — METHYLPREDNISOLONE 4 MG/1
TABLET ORAL
Qty: 21 TABLET | Refills: 0 | Status: SHIPPED | OUTPATIENT
Start: 2022-03-14 | End: 2022-03-14 | Stop reason: SDUPTHER

## 2022-03-14 RX ORDER — METHYLPREDNISOLONE 4 MG/1
TABLET ORAL
Qty: 21 TABLET | Refills: 0 | Status: SHIPPED | OUTPATIENT
Start: 2022-03-14 | End: 2022-04-12

## 2022-03-14 RX ORDER — CEFTRIAXONE 1 G/1
1 INJECTION, POWDER, FOR SOLUTION INTRAMUSCULAR; INTRAVENOUS
Status: COMPLETED | OUTPATIENT
Start: 2022-03-14 | End: 2022-03-14

## 2022-03-14 RX ADMIN — CEFTRIAXONE 1 G: 1 INJECTION, POWDER, FOR SOLUTION INTRAMUSCULAR; INTRAVENOUS at 11:03

## 2022-03-14 RX ADMIN — DEXAMETHASONE SODIUM PHOSPHATE 6 MG: 4 INJECTION, SOLUTION INTRA-ARTICULAR; INTRALESIONAL; INTRAMUSCULAR; INTRAVENOUS; SOFT TISSUE at 11:03

## 2022-03-14 NOTE — PROGRESS NOTES
[facility] - FAMILY MEDICINE       PATIENT NAME: Chano Clement   : 1950    AGE: 71 y.o. DATE: 2022    MRN: [id number]        Reason for Visit / Chief Complaint: URI (Head and chest congestion. Drainage down back of throat. Fatigue)     Subjective:     Presents as a walk-in c/o head and chest congestion for approx 3 weeks.  Productive cough - taking guaifensin for 1 mth or more. Some wheezing and SOB.  Reports PCP, Dr. Robert, gave him azithromycin but s/s aren't resolving.  Former smoker - smoked about 10 yrs, quit .    Review of Systems:     Review of Systems   Constitutional: Positive for fatigue. Negative for chills and fever.   HENT: Positive for congestion and postnasal drip. Negative for ear pain, rhinorrhea, sinus pain and sore throat.    Respiratory: Positive for cough, shortness of breath and wheezing.    Cardiovascular: Negative for chest pain.   Gastrointestinal: Negative for abdominal pain, diarrhea, nausea and vomiting.   Musculoskeletal: Positive for myalgias.   Skin: Negative.    Neurological: Positive for headaches.       Allergies and Medications:     Review of patient's allergies indicates:   Allergen Reactions    Lanacane anti-itch [benzocaine-benzethonium]      Other reaction(s): Unknown        Current Outpatient Medications on File Prior to Visit   Medication Sig Dispense Refill    cyclobenzaprine (FLEXERIL) 10 MG tablet Take 10 mg by mouth every evening.      diclofenac sodium (VOLTAREN) 1 % Gel Apply to back four times daily as needed for pain 3000 g 3    docusate sodium (COLACE) 100 MG capsule Take 1 capsule (100 mg total) by mouth 2 (two) times daily. 60 capsule 1    DULoxetine (CYMBALTA) 60 MG capsule Take 1 capsule (60 mg total) by mouth 2 (two) times daily. 60 capsule 3    dutasteride (AVODART) 0.5 mg capsule Take 1 capsule (0.5 mg total) by mouth once daily. 30 capsule 2    EScitalopram oxalate (LEXAPRO) 20 MG  tablet Take 1 tablet (20 mg total) by mouth once daily. 90 tablet 1    ezetimibe (ZETIA) 10 mg tablet Take 1 tablet (10 mg total) by mouth once daily. 90 tablet 1    gabapentin (NEURONTIN) 600 MG tablet Take 2 tablets (1,200 mg total) by mouth 2 (two) times daily. 360 tablet 0    HYDROcodone-acetaminophen (NORCO)  mg per tablet Take 1 tablet by mouth 3 (three) times daily as needed for Pain. 90 tablet 0    lisinopriL (PRINIVIL,ZESTRIL) 40 MG tablet Take 1 tablet (40 mg total) by mouth once daily. 90 tablet 1    loratadine (CLARITIN) 10 mg tablet Take 1 tablet (10 mg total) by mouth once daily. 90 tablet 1    phenoL-glycerin (CHLORASEPTIC MAX SORE THROAT) 1.5-33 % Spry 1 spray by Mucous Membrane route 2 (two) times a day. 30 mL 0    pramipexole (MIRAPEX) 0.25 MG tablet Take 1 tablet (0.25 mg total) by mouth 2 (two) times daily. 90 tablet 1    tamsulosin (FLOMAX) 0.4 mg Cap Take 1 capsule (0.4 mg total) by mouth once daily. 90 capsule 1    traZODone (DESYREL) 100 MG tablet Take 2 tablets (200 mg total) by mouth every evening. 30 tablet 3    [DISCONTINUED] methocarbamoL (ROBAXIN) 500 MG Tab       [DISCONTINUED] nystatin (MYCOSTATIN) 100,000 unit/mL suspension        No current facility-administered medications on file prior to visit.       Medical/Social/Family History:     Past Medical History:   Diagnosis Date    Allergy     Back pain     Depressive disorder     GERD (gastroesophageal reflux disease)     Hyperlipidemia     Hypertension     RAYMUNDO on CPAP     TIA (transient ischemic attack)       Social History     Tobacco Use   Smoking Status Former Smoker   Smokeless Tobacco Never Used      Social History     Substance and Sexual Activity   Alcohol Use Yes       Family History   Problem Relation Age of Onset    Heart disease Mother     Kidney failure Mother     Heart disease Father     Heart attack Father       Past Surgical History:   Procedure Laterality Date    BACK SURGERY      Caudal  "HARPER  02/18/2013    Dr Rdz    Left l4-L5 TFESI Left 06/20/2012    Dr Rdz    SHOULDER ARTHROSCOPY      T3-T4 HARPER  02/27/2012    Dr Rdz    TONSILLECTOMY      UVULECTOMY        Immunization History   Administered Date(s) Administered    Influenza - Quadrivalent - High Dose - PF (65 years and older) 01/04/2022          Objective:     Wt Readings from Last 3 Encounters:   03/14/22 1043 110.7 kg (244 lb)   02/24/22 0907 111.9 kg (246 lb 12.8 oz)   01/04/22 1455 109.8 kg (242 lb)       Vitals:    03/14/22 1043   BP: 118/64   BP Location: Right arm   Patient Position: Sitting   BP Method: Large (Manual)   Pulse: 85   Resp: 20   Temp: 97.5 °F (36.4 °C)   TempSrc: Temporal   SpO2: 97%   Weight: 110.7 kg (244 lb)   Height: 5' 9" (1.753 m)     Body mass index is 36.03 kg/m².     Physical Exam:    Physical Exam  Vitals and nursing note reviewed.   Constitutional:       General: He is not in acute distress.     Appearance: Normal appearance.   HENT:      Head: Normocephalic.      Right Ear: Hearing, ear canal and external ear normal. A middle ear effusion is present. Tympanic membrane is not erythematous.      Left Ear: Hearing, tympanic membrane, ear canal and external ear normal.      Nose: Congestion and rhinorrhea present. Rhinorrhea is purulent.      Right Turbinates: Swollen.      Left Turbinates: Swollen.      Right Sinus: Maxillary sinus tenderness present. No frontal sinus tenderness.      Left Sinus: Maxillary sinus tenderness present. No frontal sinus tenderness.      Mouth/Throat:      Lips: Pink.      Mouth: Mucous membranes are moist.      Tongue: No lesions.      Pharynx: Uvula midline. No pharyngeal swelling, oropharyngeal exudate, posterior oropharyngeal erythema (injected, PND) or uvula swelling.   Eyes:      Conjunctiva/sclera: Conjunctivae normal.      Pupils: Pupils are equal, round, and reactive to light.   Neck:      Thyroid: No thyromegaly.   Cardiovascular:      Rate and Rhythm: Normal " rate and regular rhythm.      Heart sounds: Normal heart sounds.   Pulmonary:      Effort: Pulmonary effort is normal.      Breath sounds: Normal breath sounds.   Musculoskeletal:      Cervical back: Neck supple.   Lymphadenopathy:      Cervical: No cervical adenopathy.   Skin:     General: Skin is warm and dry.   Neurological:      General: No focal deficit present.      Mental Status: He is alert and oriented to person, place, and time.         Assessment:          ICD-10-CM ICD-9-CM   1. Acute non-recurrent maxillary sinusitis  J01.00 461.0   2. Acute bronchitis, unspecified organism  J20.9 466.0        Plan:     Acute non-recurrent maxillary sinusitis  -     dexamethasone injection 6 mg  -     cefTRIAXone injection 1 g  -     amoxicillin-clavulanate 875-125mg (AUGMENTIN) 875-125 mg per tablet; Take 1 tablet by mouth every 12 (twelve) hours. for 10 days  Dispense: 20 tablet; Refill: 0  -     methylPREDNISolone (MEDROL DOSEPACK) 4 mg tablet; Take as directed on package. Take with food.  Dispense: 21 tablet; Refill: 0    Acute bronchitis, unspecified organism  -     dexamethasone injection 6 mg  -     albuterol (VENTOLIN HFA) 90 mcg/actuation inhaler; Inhale 2 puffs into the lungs every 4 (four) hours as needed for Wheezing or Shortness of Breath. Rescue  Dispense: 18 g; Refill: 1  -     methylPREDNISolone (MEDROL DOSEPACK) 4 mg tablet; Take as directed on package. Take with food.  Dispense: 21 tablet; Refill: 0        Current Outpatient Medications:     cyclobenzaprine (FLEXERIL) 10 MG tablet, Take 10 mg by mouth every evening., Disp: , Rfl:     diclofenac sodium (VOLTAREN) 1 % Gel, Apply to back four times daily as needed for pain, Disp: 3000 g, Rfl: 3    docusate sodium (COLACE) 100 MG capsule, Take 1 capsule (100 mg total) by mouth 2 (two) times daily., Disp: 60 capsule, Rfl: 1    DULoxetine (CYMBALTA) 60 MG capsule, Take 1 capsule (60 mg total) by mouth 2 (two) times daily., Disp: 60 capsule, Rfl: 3     dutasteride (AVODART) 0.5 mg capsule, Take 1 capsule (0.5 mg total) by mouth once daily., Disp: 30 capsule, Rfl: 2    EScitalopram oxalate (LEXAPRO) 20 MG tablet, Take 1 tablet (20 mg total) by mouth once daily., Disp: 90 tablet, Rfl: 1    ezetimibe (ZETIA) 10 mg tablet, Take 1 tablet (10 mg total) by mouth once daily., Disp: 90 tablet, Rfl: 1    gabapentin (NEURONTIN) 600 MG tablet, Take 2 tablets (1,200 mg total) by mouth 2 (two) times daily., Disp: 360 tablet, Rfl: 0    HYDROcodone-acetaminophen (NORCO)  mg per tablet, Take 1 tablet by mouth 3 (three) times daily as needed for Pain., Disp: 90 tablet, Rfl: 0    lisinopriL (PRINIVIL,ZESTRIL) 40 MG tablet, Take 1 tablet (40 mg total) by mouth once daily., Disp: 90 tablet, Rfl: 1    loratadine (CLARITIN) 10 mg tablet, Take 1 tablet (10 mg total) by mouth once daily., Disp: 90 tablet, Rfl: 1    phenoL-glycerin (CHLORASEPTIC MAX SORE THROAT) 1.5-33 % Spry, 1 spray by Mucous Membrane route 2 (two) times a day., Disp: 30 mL, Rfl: 0    pramipexole (MIRAPEX) 0.25 MG tablet, Take 1 tablet (0.25 mg total) by mouth 2 (two) times daily., Disp: 90 tablet, Rfl: 1    tamsulosin (FLOMAX) 0.4 mg Cap, Take 1 capsule (0.4 mg total) by mouth once daily., Disp: 90 capsule, Rfl: 1    traZODone (DESYREL) 100 MG tablet, Take 2 tablets (200 mg total) by mouth every evening., Disp: 30 tablet, Rfl: 3    albuterol (VENTOLIN HFA) 90 mcg/actuation inhaler, Inhale 2 puffs into the lungs every 4 (four) hours as needed for Wheezing or Shortness of Breath. Rescue, Disp: 18 g, Rfl: 1    amoxicillin-clavulanate 875-125mg (AUGMENTIN) 875-125 mg per tablet, Take 1 tablet by mouth every 12 (twelve) hours. for 10 days, Disp: 20 tablet, Rfl: 0    methylPREDNISolone (MEDROL DOSEPACK) 4 mg tablet, Take as directed on package. Take with food., Disp: 21 tablet, Rfl: 0  No current facility-administered medications for this visit.    Requested Prescriptions     Signed Prescriptions Disp  Refills    albuterol (VENTOLIN HFA) 90 mcg/actuation inhaler 18 g 1     Sig: Inhale 2 puffs into the lungs every 4 (four) hours as needed for Wheezing or Shortness of Breath. Rescue    amoxicillin-clavulanate 875-125mg (AUGMENTIN) 875-125 mg per tablet 20 tablet 0     Sig: Take 1 tablet by mouth every 12 (twelve) hours. for 10 days    methylPREDNISolone (MEDROL DOSEPACK) 4 mg tablet 21 tablet 0     Sig: Take as directed on package. Take with food.       F/u as needed or if symptoms worsen or persist.  Future Appointments   Date Time Provider Department Center   3/14/2022  2:15 PM James Robert MD [facility] [place]   5/19/2022  3:00 PM James Robert MD [facility] [place]       Signature: Calista Wright [facility]

## 2022-03-14 NOTE — TELEPHONE ENCOUNTER
[place] [facility] [place] will not answer the phone to let us know if they got it or not. Pt said to just send to [facility].

## 2022-03-16 ENCOUNTER — TELEPHONE (OUTPATIENT)
Dept: FAMILY MEDICINE | Facility: CLINIC | Age: 72
End: 2022-03-16
Payer: MEDICARE

## 2022-03-16 NOTE — TELEPHONE ENCOUNTER
Call returned to pt. Pt concerned about results from KUB. Pt stated that he will come in tomorrow morning to speak with Dr. Robert about the results.

## 2022-03-16 NOTE — TELEPHONE ENCOUNTER
----- Message from Josiane Ceron sent at 3/15/2022  4:49 PM CDT -----  Patient called asking to speak with a nurse     [phone]

## 2022-03-17 ENCOUNTER — OFFICE VISIT (OUTPATIENT)
Dept: FAMILY MEDICINE | Facility: CLINIC | Age: 72
End: 2022-03-17
Payer: MEDICARE

## 2022-03-17 VITALS
DIASTOLIC BLOOD PRESSURE: 72 MMHG | WEIGHT: 248 LBS | TEMPERATURE: 97 F | HEIGHT: 69 IN | SYSTOLIC BLOOD PRESSURE: 143 MMHG | HEART RATE: 81 BPM | OXYGEN SATURATION: 99 % | BODY MASS INDEX: 36.73 KG/M2 | RESPIRATION RATE: 18 BRPM

## 2022-03-17 DIAGNOSIS — I10 PRIMARY HYPERTENSION: ICD-10-CM

## 2022-03-17 DIAGNOSIS — J30.89 SEASONAL ALLERGIC RHINITIS DUE TO OTHER ALLERGIC TRIGGER: ICD-10-CM

## 2022-03-17 DIAGNOSIS — N20.1 URETERAL CALCULUS: Primary | ICD-10-CM

## 2022-03-17 PROCEDURE — 99214 PR OFFICE/OUTPT VISIT, EST, LEVL IV, 30-39 MIN: ICD-10-PCS | Mod: ,,, | Performed by: INTERNAL MEDICINE

## 2022-03-17 PROCEDURE — 99214 OFFICE O/P EST MOD 30 MIN: CPT | Mod: ,,, | Performed by: INTERNAL MEDICINE

## 2022-03-24 NOTE — PROGRESS NOTES
Subjective:       Patient ID: Chano Clement is a 71 y.o. male.    Chief Complaint: Sore Throat    Patient is here today for check up evaluation. Patient recent KUB reviewed and shows possible ureteral stone. Patient denies any pain. Recent labs reviewed and all within normal limits. Patient does complain of sinus drainage in the back of his throat. Currently taking OTC Claritin and Flonase. Will follow patient in 3 months.       Current Medications:    Current Outpatient Medications:     albuterol (VENTOLIN HFA) 90 mcg/actuation inhaler, Inhale 2 puffs into the lungs every 4 (four) hours as needed for Wheezing or Shortness of Breath. Rescue, Disp: 18 g, Rfl: 1    amoxicillin-clavulanate 875-125mg (AUGMENTIN) 875-125 mg per tablet, Take 1 tablet by mouth every 12 (twelve) hours. for 10 days, Disp: 20 tablet, Rfl: 0    cyclobenzaprine (FLEXERIL) 10 MG tablet, Take 10 mg by mouth every evening., Disp: , Rfl:     diclofenac sodium (VOLTAREN) 1 % Gel, Apply to back four times daily as needed for pain, Disp: 3000 g, Rfl: 3    docusate sodium (COLACE) 100 MG capsule, Take 1 capsule (100 mg total) by mouth 2 (two) times daily., Disp: 60 capsule, Rfl: 1    DULoxetine (CYMBALTA) 60 MG capsule, Take 1 capsule (60 mg total) by mouth 2 (two) times daily., Disp: 60 capsule, Rfl: 3    dutasteride (AVODART) 0.5 mg capsule, Take 1 capsule (0.5 mg total) by mouth once daily., Disp: 30 capsule, Rfl: 2    EScitalopram oxalate (LEXAPRO) 20 MG tablet, Take 1 tablet (20 mg total) by mouth once daily., Disp: 90 tablet, Rfl: 1    ezetimibe (ZETIA) 10 mg tablet, Take 1 tablet (10 mg total) by mouth once daily., Disp: 90 tablet, Rfl: 1    gabapentin (NEURONTIN) 600 MG tablet, Take 2 tablets (1,200 mg total) by mouth 2 (two) times daily., Disp: 360 tablet, Rfl: 0    HYDROcodone-acetaminophen (NORCO)  mg per tablet, Take 1 tablet by mouth 3 (three) times daily as needed for Pain., Disp: 90 tablet, Rfl: 0    lisinopriL  (PRINIVIL,ZESTRIL) 40 MG tablet, Take 1 tablet (40 mg total) by mouth once daily., Disp: 90 tablet, Rfl: 1    loratadine (CLARITIN) 10 mg tablet, Take 1 tablet (10 mg total) by mouth once daily., Disp: 90 tablet, Rfl: 1    methylPREDNISolone (MEDROL DOSEPACK) 4 mg tablet, Take as directed on package. Take with food., Disp: 21 tablet, Rfl: 0    phenoL-glycerin (CHLORASEPTIC MAX SORE THROAT) 1.5-33 % Spry, 1 spray by Mucous Membrane route 2 (two) times a day., Disp: 30 mL, Rfl: 0    pramipexole (MIRAPEX) 0.25 MG tablet, Take 1 tablet (0.25 mg total) by mouth 2 (two) times daily., Disp: 90 tablet, Rfl: 1    tamsulosin (FLOMAX) 0.4 mg Cap, Take 1 capsule (0.4 mg total) by mouth once daily., Disp: 90 capsule, Rfl: 1    traZODone (DESYREL) 100 MG tablet, Take 2 tablets (200 mg total) by mouth every evening., Disp: 30 tablet, Rfl: 3    Last Labs:     Office Visit on 02/24/2022   Component Date Value    SARS Coronavirus 2 Antig* 02/24/2022 Negative     Rapid Influenza A Ag 02/24/2022 Negative     Rapid Influenza B Ag 02/24/2022 Negative      Acceptab* 02/24/2022 Yes     Rapid Strep A Screen 02/24/2022 Negative      Acceptab* 02/24/2022 Yes     Sodium 02/24/2022 138     Potassium 02/24/2022 3.6     Chloride 02/24/2022 106     CO2 02/24/2022 26     Anion Gap 02/24/2022 10     Glucose 02/24/2022 109 (A)    BUN 02/24/2022 14     Creatinine 02/24/2022 0.99     BUN/Creatinine Ratio 02/24/2022 14     Calcium 02/24/2022 8.4 (A)    eGFR 02/24/2022 79     Vitamin B12 02/24/2022 669     Total Protein 02/24/2022 7.0     Albumin 02/24/2022 3.5     Bilirubin, Total 02/24/2022 0.3     Bilirubin, Direct 02/24/2022 <0.1     AST 02/24/2022 13 (A)    ALT 02/24/2022 29     Alk Phos 02/24/2022 83     WBC 02/24/2022 7.63     RBC 02/24/2022 4.47 (A)    Hemoglobin 02/24/2022 13.5     Hematocrit 02/24/2022 40.1     MCV 02/24/2022 89.7     MCH 02/24/2022 30.2     MCHC 02/24/2022  33.7     RDW 02/24/2022 14.7 (A)    Platelet Count 02/24/2022 231     MPV 02/24/2022 11.4     Neutrophils % 02/24/2022 55.4     Lymphocytes % 02/24/2022 21.1 (A)    Monocytes % 02/24/2022 20.7 (A)    Eosinophils % 02/24/2022 2.0     Basophils % 02/24/2022 0.4     Immature Granulocytes % 02/24/2022 0.4     nRBC, Auto 02/24/2022 0.0     Neutrophils, Abs 02/24/2022 4.23     Lymphocytes, Absolute 02/24/2022 1.61     Monocytes, Absolute 02/24/2022 1.58 (A)    Eosinophils, Absolute 02/24/2022 0.15     Basophils, Absolute 02/24/2022 0.03     Immature Granulocytes, A* 02/24/2022 0.03     nRBC, Absolute 02/24/2022 0.00     Diff Type 02/24/2022 Auto        Last Imaging:  X-Ray KUB  Narrative: EXAMINATION:  XR KUB    CLINICAL HISTORY:  Abdominal distension (gaseous)    COMPARISON:  None    TECHNIQUE:  Frontal views of the abdomen.    FINDINGS:  Nonspecific bowel gas pattern.  4 mm phlebolith versus distal left ureteral calculus within the left pelvis.  Levoconvex curvature of the lumbar spine with degenerative change.  Impression: As above.    Point of Service: [facility]    Electronically signed by: Kayedn Cobb  Date:    02/24/2022  Time:    10:20         Review of Systems   HENT: Positive for nasal congestion.    All other systems reviewed and are negative.        Objective:      Physical Exam  Vitals reviewed.   Constitutional:       Appearance: Normal appearance.   Cardiovascular:      Rate and Rhythm: Normal rate and regular rhythm.      Pulses: Normal pulses.      Heart sounds: Normal heart sounds.   Pulmonary:      Effort: Pulmonary effort is normal.      Breath sounds: Normal breath sounds.   Abdominal:      General: Abdomen is flat. Bowel sounds are normal.      Palpations: Abdomen is soft.   Musculoskeletal:         General: Normal range of motion.      Cervical back: Normal range of motion and neck supple.   Skin:     General: Skin is warm and dry.   Neurological:      General: No  focal deficit present.      Mental Status: He is alert and oriented to person, place, and time. Mental status is at baseline.         Assessment:       1. Ureteral calculus     2. Primary hypertension     3. Seasonal allergic rhinitis due to other allergic trigger          Plan:         Chano was seen today for sore throat.    Diagnoses and all orders for this visit:    Ureteral calculus    Primary hypertension    Seasonal allergic rhinitis due to other allergic trigger

## 2022-03-24 NOTE — PATIENT INSTRUCTIONS
Chano was seen today for sore throat.    Diagnoses and all orders for this visit:    Ureteral calculus    Primary hypertension    Seasonal allergic rhinitis due to other allergic trigger

## 2022-03-30 ENCOUNTER — TELEPHONE (OUTPATIENT)
Dept: FAMILY MEDICINE | Facility: CLINIC | Age: 72
End: 2022-03-30
Payer: MEDICARE

## 2022-03-30 RX ORDER — PRAMIPEXOLE DIHYDROCHLORIDE 0.25 MG/1
0.25 TABLET ORAL 2 TIMES DAILY
Qty: 180 TABLET | Refills: 1 | Status: SHIPPED | OUTPATIENT
Start: 2022-03-30 | End: 2022-09-27 | Stop reason: SDUPTHER

## 2022-03-30 NOTE — TELEPHONE ENCOUNTER
----- Message from Gina Vasquez sent at 3/30/2022  9:06 AM CDT -----  pramipexole (MIRAPEX) 0.25 MG tablet ....[facility]

## 2022-03-30 NOTE — TELEPHONE ENCOUNTER
----- Message from Faviola Cortez sent at 3/30/2022  8:32 AM CDT -----  Patient needs a refill on Mirapex called into [facility].   Please call patient at [phone] if you have any questions. Thanks!

## 2022-04-05 RX ORDER — TRAZODONE HYDROCHLORIDE 100 MG/1
200 TABLET ORAL NIGHTLY
Qty: 30 TABLET | Refills: 3 | Status: SHIPPED | OUTPATIENT
Start: 2022-04-05 | End: 2022-04-11 | Stop reason: SDUPTHER

## 2022-04-05 NOTE — TELEPHONE ENCOUNTER
----- Message from Gina Vasquez sent at 4/5/2022 10:30 AM CDT -----  Refill on traZODone (DESYREL) 100 MG tablet. [facility]

## 2022-04-11 RX ORDER — TRAZODONE HYDROCHLORIDE 100 MG/1
200 TABLET ORAL NIGHTLY
Qty: 60 TABLET | Refills: 0 | Status: SHIPPED | OUTPATIENT
Start: 2022-04-11 | End: 2022-06-09 | Stop reason: SDUPTHER

## 2022-04-11 NOTE — TELEPHONE ENCOUNTER
----- Message from Josiane Ceron sent at 4/11/2022 11:15 AM CDT -----  [facility] (traZODone) in stock. So they were not able to fill it. Patient is requesting traZODone 100 mg to be [facility] to [facility]    [phone]

## 2022-04-12 ENCOUNTER — OFFICE VISIT (OUTPATIENT)
Dept: FAMILY MEDICINE | Facility: CLINIC | Age: 72
End: 2022-04-12
Payer: MEDICARE

## 2022-04-12 VITALS
OXYGEN SATURATION: 98 % | DIASTOLIC BLOOD PRESSURE: 72 MMHG | SYSTOLIC BLOOD PRESSURE: 120 MMHG | HEART RATE: 71 BPM | RESPIRATION RATE: 20 BRPM | WEIGHT: 245.63 LBS | BODY MASS INDEX: 37.23 KG/M2 | TEMPERATURE: 99 F | HEIGHT: 68 IN

## 2022-04-12 DIAGNOSIS — M54.42 CHRONIC BILATERAL LOW BACK PAIN WITH LEFT-SIDED SCIATICA: ICD-10-CM

## 2022-04-12 DIAGNOSIS — J31.0 CHRONIC RHINITIS: ICD-10-CM

## 2022-04-12 DIAGNOSIS — G89.29 CHRONIC BILATERAL LOW BACK PAIN WITH LEFT-SIDED SCIATICA: ICD-10-CM

## 2022-04-12 DIAGNOSIS — Z79.899 ENCOUNTER FOR LONG-TERM (CURRENT) USE OF OTHER MEDICATIONS: ICD-10-CM

## 2022-04-12 DIAGNOSIS — I10 ESSENTIAL (PRIMARY) HYPERTENSION: Primary | ICD-10-CM

## 2022-04-12 DIAGNOSIS — Z11.59 NEED FOR HEPATITIS C SCREENING TEST: ICD-10-CM

## 2022-04-12 DIAGNOSIS — E78.00 PURE HYPERCHOLESTEROLEMIA: ICD-10-CM

## 2022-04-12 DIAGNOSIS — F41.1 GENERALIZED ANXIETY DISORDER: ICD-10-CM

## 2022-04-12 DIAGNOSIS — Z86.010 HISTORY OF COLON POLYPS: ICD-10-CM

## 2022-04-12 PROBLEM — G25.81 RLS (RESTLESS LEGS SYNDROME): Status: ACTIVE | Noted: 2022-04-12

## 2022-04-12 PROBLEM — G47.33 OSA ON CPAP: Status: ACTIVE | Noted: 2022-04-12

## 2022-04-12 PROBLEM — J01.00 ACUTE NON-RECURRENT MAXILLARY SINUSITIS: Status: RESOLVED | Noted: 2022-03-14 | Resolved: 2022-04-12

## 2022-04-12 PROBLEM — F41.9 ANXIETY DISORDER, UNSPECIFIED: Status: ACTIVE | Noted: 2022-04-12

## 2022-04-12 PROBLEM — J20.9 ACUTE BRONCHITIS: Status: RESOLVED | Noted: 2022-03-14 | Resolved: 2022-04-12

## 2022-04-12 PROBLEM — Z86.0100 HISTORY OF COLON POLYPS: Status: ACTIVE | Noted: 2022-04-12

## 2022-04-12 LAB
ALBUMIN SERPL BCP-MCNC: 3.8 G/DL (ref 3.5–5)
ALBUMIN/GLOB SERPL: 1.2 {RATIO}
ALP SERPL-CCNC: 72 U/L (ref 45–115)
ALT SERPL W P-5'-P-CCNC: 26 U/L (ref 16–61)
ANION GAP SERPL CALCULATED.3IONS-SCNC: 10 MMOL/L (ref 7–16)
AST SERPL W P-5'-P-CCNC: 12 U/L (ref 15–37)
BASOPHILS # BLD AUTO: 0.04 K/UL (ref 0–0.2)
BASOPHILS NFR BLD AUTO: 0.5 % (ref 0–1)
BILIRUB SERPL-MCNC: 0.3 MG/DL (ref 0–1.2)
BUN SERPL-MCNC: 26 MG/DL (ref 7–18)
BUN/CREAT SERPL: 27 (ref 6–20)
CALCIUM SERPL-MCNC: 8.8 MG/DL (ref 8.5–10.1)
CHLORIDE SERPL-SCNC: 107 MMOL/L (ref 98–107)
CHOLEST SERPL-MCNC: 166 MG/DL (ref 0–200)
CHOLEST/HDLC SERPL: 2.9 {RATIO}
CO2 SERPL-SCNC: 26 MMOL/L (ref 21–32)
CREAT SERPL-MCNC: 0.98 MG/DL (ref 0.7–1.3)
DIFFERENTIAL METHOD BLD: ABNORMAL
EOSINOPHIL # BLD AUTO: 0.23 K/UL (ref 0–0.5)
EOSINOPHIL NFR BLD AUTO: 3.1 % (ref 1–4)
ERYTHROCYTE [DISTWIDTH] IN BLOOD BY AUTOMATED COUNT: 15.6 % (ref 11.5–14.5)
GLOBULIN SER-MCNC: 3.1 G/DL (ref 2–4)
GLUCOSE SERPL-MCNC: 81 MG/DL (ref 74–106)
HCT VFR BLD AUTO: 41.7 % (ref 40–54)
HCV AB SER QL: NORMAL
HDLC SERPL-MCNC: 58 MG/DL (ref 40–60)
HGB BLD-MCNC: 12.9 G/DL (ref 13.5–18)
IMM GRANULOCYTES # BLD AUTO: 0.01 K/UL (ref 0–0.04)
IMM GRANULOCYTES NFR BLD: 0.1 % (ref 0–0.4)
LDLC SERPL CALC-MCNC: 95 MG/DL
LDLC/HDLC SERPL: 1.6 {RATIO}
LYMPHOCYTES # BLD AUTO: 2.9 K/UL (ref 1–4.8)
LYMPHOCYTES NFR BLD AUTO: 39.3 % (ref 27–41)
MCH RBC QN AUTO: 29.8 PG (ref 27–31)
MCHC RBC AUTO-ENTMCNC: 30.9 G/DL (ref 32–36)
MCV RBC AUTO: 96.3 FL (ref 80–96)
MONOCYTES # BLD AUTO: 0.75 K/UL (ref 0–0.8)
MONOCYTES NFR BLD AUTO: 10.2 % (ref 2–6)
MPC BLD CALC-MCNC: 11 FL (ref 9.4–12.4)
NEUTROPHILS # BLD AUTO: 3.44 K/UL (ref 1.8–7.7)
NEUTROPHILS NFR BLD AUTO: 46.8 % (ref 53–65)
NONHDLC SERPL-MCNC: 108 MG/DL
NRBC # BLD AUTO: 0 X10E3/UL
NRBC, AUTO (.00): 0 %
PLATELET # BLD AUTO: 281 K/UL (ref 150–400)
POTASSIUM SERPL-SCNC: 4.8 MMOL/L (ref 3.5–5.1)
PROT SERPL-MCNC: 6.9 G/DL (ref 6.4–8.2)
RBC # BLD AUTO: 4.33 M/UL (ref 4.6–6.2)
SODIUM SERPL-SCNC: 138 MMOL/L (ref 136–145)
TRIGL SERPL-MCNC: 63 MG/DL (ref 35–150)
TSH SERPL DL<=0.005 MIU/L-ACNC: 1.25 UIU/ML (ref 0.36–3.74)
VLDLC SERPL-MCNC: 13 MG/DL
WBC # BLD AUTO: 7.37 K/UL (ref 4.5–11)

## 2022-04-12 PROCEDURE — 86803 HEPATITIS C AB TEST: CPT | Mod: ,,, | Performed by: CLINICAL MEDICAL LABORATORY

## 2022-04-12 PROCEDURE — 86803 HEPATITIS C ANTIBODY: ICD-10-PCS | Mod: ,,, | Performed by: CLINICAL MEDICAL LABORATORY

## 2022-04-12 PROCEDURE — 80061 LIPID PANEL: ICD-10-PCS | Mod: ,,, | Performed by: CLINICAL MEDICAL LABORATORY

## 2022-04-12 PROCEDURE — 80053 COMPREHEN METABOLIC PANEL: CPT | Mod: ,,, | Performed by: CLINICAL MEDICAL LABORATORY

## 2022-04-12 PROCEDURE — 80053 COMPREHENSIVE METABOLIC PANEL: ICD-10-PCS | Mod: ,,, | Performed by: CLINICAL MEDICAL LABORATORY

## 2022-04-12 PROCEDURE — 85025 COMPLETE CBC W/AUTO DIFF WBC: CPT | Mod: ,,, | Performed by: CLINICAL MEDICAL LABORATORY

## 2022-04-12 PROCEDURE — 84443 ASSAY THYROID STIM HORMONE: CPT | Mod: ,,, | Performed by: CLINICAL MEDICAL LABORATORY

## 2022-04-12 PROCEDURE — 99214 OFFICE O/P EST MOD 30 MIN: CPT | Mod: ,,, | Performed by: NURSE PRACTITIONER

## 2022-04-12 PROCEDURE — 99214 PR OFFICE/OUTPT VISIT, EST, LEVL IV, 30-39 MIN: ICD-10-PCS | Mod: ,,, | Performed by: NURSE PRACTITIONER

## 2022-04-12 PROCEDURE — 80061 LIPID PANEL: CPT | Mod: ,,, | Performed by: CLINICAL MEDICAL LABORATORY

## 2022-04-12 PROCEDURE — 84443 TSH: ICD-10-PCS | Mod: ,,, | Performed by: CLINICAL MEDICAL LABORATORY

## 2022-04-12 PROCEDURE — 85025 CBC WITH DIFFERENTIAL: ICD-10-PCS | Mod: ,,, | Performed by: CLINICAL MEDICAL LABORATORY

## 2022-04-12 RX ORDER — LORATADINE 10 MG/1
10 TABLET ORAL DAILY
Qty: 90 TABLET | Refills: 1 | Status: SHIPPED | OUTPATIENT
Start: 2022-04-12 | End: 2022-07-25 | Stop reason: SDUPTHER

## 2022-04-12 RX ORDER — ESCITALOPRAM OXALATE 20 MG/1
20 TABLET ORAL DAILY
Qty: 90 TABLET | Refills: 1 | Status: SHIPPED | OUTPATIENT
Start: 2022-04-12 | End: 2022-04-12 | Stop reason: SDUPTHER

## 2022-04-12 RX ORDER — ESCITALOPRAM OXALATE 20 MG/1
20 TABLET ORAL DAILY
Qty: 90 TABLET | Refills: 3 | Status: SHIPPED | OUTPATIENT
Start: 2022-04-12 | End: 2023-04-25 | Stop reason: SDUPTHER

## 2022-04-12 RX ORDER — LISINOPRIL 40 MG/1
40 TABLET ORAL DAILY
Qty: 90 TABLET | Refills: 3 | Status: SHIPPED | OUTPATIENT
Start: 2022-04-12 | End: 2022-07-25 | Stop reason: SDUPTHER

## 2022-04-12 RX ORDER — LISINOPRIL 40 MG/1
40 TABLET ORAL DAILY
Qty: 90 TABLET | Refills: 1 | Status: SHIPPED | OUTPATIENT
Start: 2022-04-12 | End: 2022-04-12 | Stop reason: SDUPTHER

## 2022-04-12 NOTE — PROGRESS NOTES
[facility] - FAMILY MEDICINE       PATIENT NAME: Chano Clement   : 1950    AGE: 71 y.o. DATE: 2022    MRN: [id number]        Reason for Visit / Chief Complaint:  Establish Care (Pt presents to establish care. He is fasting.), Hyperlipidemia, and Hypertension     Subjective:     HPI:  Presents to establish care.   PMH HTN, HLD, RLS    Hx of anxiety/depression - on lexapro; on cymbalta for chronic musculoskeletal pain.    Colonoscopy 3/20/13 w/ polyp removal then 3/25/13 for post-polypectomy GI bleeding per Dr. Alvarado. Repeat 4 yrs recommended. Changed to Dr. Brito and last colonoscopy was approx 2 yrs ago.    [facility] manages chronic low back pain w/ LLE sciatica and chronic arthralgias.  Chronic neuropathy LLE  Has been to PT at [facility] in the past which helped.    PHQ9 2022   Total Score 6       Review of Systems:     Review of Systems   Constitutional: Negative.    HENT: Negative.    Respiratory: Negative.    Cardiovascular: Negative.    Gastrointestinal: Negative.    Musculoskeletal: Positive for arthralgias and back pain.   Skin: Negative.    Neurological: Negative.    Psychiatric/Behavioral: Dysphoric mood: controlled with med.       Allergies and Medications:     Review of patient's allergies indicates:   Allergen Reactions    Lanacane anti-itch [benzocaine-benzethonium]      Other reaction(s): Unknown        Current Outpatient Medications on File Prior to Visit   Medication Sig Dispense Refill    albuterol (VENTOLIN HFA) 90 mcg/actuation inhaler Inhale 2 puffs into the lungs every 4 (four) hours as needed for Wheezing or Shortness of Breath. Rescue 18 g 1    cyclobenzaprine (FLEXERIL) 10 MG tablet Take 10 mg by mouth every evening.      diclofenac sodium (VOLTAREN) 1 % Gel Apply to back four times daily as needed for pain 3000 g 3    docusate sodium (COLACE) 100 MG capsule Take 1 capsule (100 mg total) by mouth 2  (two) times daily. 60 capsule 1    DULoxetine (CYMBALTA) 60 MG capsule Take 1 capsule (60 mg total) by mouth 2 (two) times daily. 60 capsule 3    ezetimibe (ZETIA) 10 mg tablet Take 1 tablet (10 mg total) by mouth once daily. 90 tablet 1    gabapentin (NEURONTIN) 600 MG tablet Take 2 tablets (1,200 mg total) by mouth 2 (two) times daily. 360 tablet 0    HYDROcodone-acetaminophen (NORCO)  mg per tablet Take 1 tablet by mouth 3 (three) times daily as needed for Pain. 90 tablet 0    pramipexole (MIRAPEX) 0.25 MG tablet Take 1 tablet (0.25 mg total) by mouth 2 (two) times daily. 180 tablet 1    tamsulosin (FLOMAX) 0.4 mg Cap Take 1 capsule (0.4 mg total) by mouth once daily. 90 capsule 1    traZODone (DESYREL) 100 MG tablet Take 2 tablets (200 mg total) by mouth every evening. 60 tablet 0    [DISCONTINUED] EScitalopram oxalate (LEXAPRO) 20 MG tablet Take 1 tablet (20 mg total) by mouth once daily. 90 tablet 1    [DISCONTINUED] lisinopriL (PRINIVIL,ZESTRIL) 40 MG tablet Take 1 tablet (40 mg total) by mouth once daily. 90 tablet 1    [DISCONTINUED] loratadine (CLARITIN) 10 mg tablet Take 1 tablet (10 mg total) by mouth once daily. 90 tablet 1    [DISCONTINUED] dutasteride (AVODART) 0.5 mg capsule Take 1 capsule (0.5 mg total) by mouth once daily. (Patient not taking: Reported on 4/12/2022) 30 capsule 2    [DISCONTINUED] methylPREDNISolone (MEDROL DOSEPACK) 4 mg tablet Take as directed on package. Take with food. (Patient not taking: Reported on 4/12/2022) 21 tablet 0    [DISCONTINUED] phenoL-glycerin (CHLORASEPTIC MAX SORE THROAT) 1.5-33 % Spry 1 spray by Mucous Membrane route 2 (two) times a day. (Patient not taking: Reported on 4/12/2022) 30 mL 0     No current facility-administered medications on file prior to visit.       Labs:     Lipids:   Lab Results   Component Value Date    CHOL 186 07/23/2021     Lab Results   Component Value Date    HDL 61 (H) 07/23/2021     Lab Results   Component Value Date     LDLCALC 107 07/23/2021     Lab Results   Component Value Date    TRIG 88 07/23/2021     Lab Results   Component Value Date    CHOLHDL 3.0 07/23/2021        CMP:  Sodium   Date Value Ref Range Status   02/24/2022 138 136 - 145 mmol/L Final     Potassium   Date Value Ref Range Status   02/24/2022 3.6 3.5 - 5.1 mmol/L Final     Chloride   Date Value Ref Range Status   02/24/2022 106 98 - 107 mmol/L Final     CO2   Date Value Ref Range Status   02/24/2022 26 21 - 32 mmol/L Final     Glucose   Date Value Ref Range Status   02/24/2022 109 (H) 74 - 106 mg/dL Final     BUN   Date Value Ref Range Status   02/24/2022 14 7 - 18 mg/dL Final     Creatinine   Date Value Ref Range Status   02/24/2022 0.99 0.70 - 1.30 mg/dL Final     Calcium   Date Value Ref Range Status   02/24/2022 8.4 (L) 8.5 - 10.1 mg/dL Final     Total Protein   Date Value Ref Range Status   02/24/2022 7.0 6.4 - 8.2 g/dL Final     Albumin   Date Value Ref Range Status   02/24/2022 3.5 3.5 - 5.0 g/dL Final     Bilirubin, Total   Date Value Ref Range Status   02/24/2022 0.3 0.0 - 1.2 mg/dL Final     Alk Phos   Date Value Ref Range Status   02/24/2022 83 45 - 115 U/L Final     AST   Date Value Ref Range Status   02/24/2022 13 (L) 15 - 37 U/L Final     ALT   Date Value Ref Range Status   02/24/2022 29 16 - 61 U/L Final     Anion Gap   Date Value Ref Range Status   02/24/2022 10 7 - 16 mmol/L Final     eGFR   Date Value Ref Range Status   02/24/2022 79 >=60 mL/min/1.73m² Final      CBC:  Lab Results   Component Value Date    WBC 7.63 02/24/2022    RBC 4.47 (L) 02/24/2022    HGB 13.5 02/24/2022    HCT 40.1 02/24/2022    MCV 89.7 02/24/2022    MCH 30.2 02/24/2022    MCHC 33.7 02/24/2022    RDW 14.7 (H) 02/24/2022     02/24/2022    MPV 11.4 02/24/2022    LYMPH 21.1 (L) 02/24/2022    LYMPH 1.61 02/24/2022    MONO 20.7 (H) 02/24/2022    EOS 0.15 02/24/2022    BASO 0.03 02/24/2022    EOSINOPHIL 2.0 02/24/2022    BASOPHIL 0.4 02/24/2022      PSA Total (ng/mL)    Date Value   2021 1.220         Medical/Social/Family History:     Past Medical History:   Diagnosis Date    Anxiety disorder, unspecified     Chronic bilateral low back pain with left-sided sciatica 2022    Chronic rhinitis 2022    Depressive disorder     Essential (primary) hypertension     GERD (gastroesophageal reflux disease)     History of colon polyps 2022    Hyperlipidemia     RAYMUNDO on CPAP     RLS (restless legs syndrome)     TIA (transient ischemic attack)       Social History     Tobacco Use   Smoking Status Former Smoker    Packs/day: 1.00    Years: 10.00    Pack years: 10.00    Types: Cigarettes    Quit date:     Years since quittin.3   Smokeless Tobacco Never Used      Social History     Substance and Sexual Activity   Alcohol Use Yes    Comment: Occasionally       Family History   Problem Relation Age of Onset    Heart disease Mother     Rheumatic fever Mother     Heart disease Father     Heart attack Father     No Known Problems Sister     Arthritis Sister     No Known Problems Brother     No Known Problems Brother     Hyperlipidemia Son     Hypertension Son     No Known Problems Maternal Grandmother     No Known Problems Maternal Grandfather     No Known Problems Paternal Grandmother     No Known Problems Paternal Grandfather       Past Surgical History:   Procedure Laterality Date    BACK SURGERY      Caudal HARPER  2013    Dr Rdz    FOOT SURGERY Left     Left l4-L5 TFESI Left 2012    Dr Rdz    SHOULDER ARTHROSCOPY      T3-T4 HARPER  2012    Dr Rdz    TONSILLECTOMY      UVULECTOMY          Health Maintenance:     Immunization History   Administered Date(s) Administered    COVID-19, MRNA, LN-S, PF (MODERNA FULL 0.5 ML DOSE) 2021, 2021    Hepatitis B, Adult 2000, 2000    Influenza - Quadrivalent - High Dose - PF (65 years and older) 2022    Influenza Whole 1999,  "02/05/2002    Pneumococcal Conjugate - 13 Valent 01/24/2015    Pneumococcal Conjugate - 20 Valent 07/16/2019    Td (ADULT) 11/23/1998    Tdap 06/29/2010, 08/08/2013, 01/10/2017      Health Maintenance Due   Topic Date Due    Hepatitis C Screening  Never done    Eye Exam  Never done    Colorectal Cancer Screening  Never done    Shingles Vaccine (1 of 2) Never done    Abdominal Aortic Aneurysm Screening  Never done    COVID-19 Vaccine (3 - Booster for Moderna series) 08/01/2021     Health Maintenance Topics with due status: Not Due       Topic Last Completion Date    TETANUS VACCINE 01/10/2017    PROSTATE-SPECIFIC ANTIGEN 07/23/2021    Lipid Panel 07/23/2021       Objective:      Wt Readings from Last 3 Encounters:   04/12/22 1057 111.4 kg (245 lb 9.6 oz)   03/17/22 0958 112.5 kg (248 lb)   03/14/22 1043 110.7 kg (244 lb)     Vitals:    04/12/22 1057   BP: 120/72   BP Location: Right arm   Patient Position: Sitting   BP Method: Large (Manual)   Pulse: 71   Resp: 20   Temp: 98.5 °F (36.9 °C)   TempSrc: Temporal   SpO2: 98%   Weight: 111.4 kg (245 lb 9.6 oz)   Height: 5' 8" (1.727 m)     Body mass index is 37.34 kg/m².     Physical Exam:    Physical Exam  Vitals and nursing note reviewed.   Constitutional:       General: He is not in acute distress.     Appearance: Normal appearance. He is obese.   HENT:      Head: Normocephalic.      Right Ear: Tympanic membrane, ear canal and external ear normal.      Left Ear: Tympanic membrane, ear canal and external ear normal.      Nose: Nose normal.      Mouth/Throat:      Mouth: Mucous membranes are moist.      Pharynx: Oropharynx is clear.   Eyes:      Conjunctiva/sclera: Conjunctivae normal.      Pupils: Pupils are equal, round, and reactive to light.   Neck:      Thyroid: No thyromegaly.      Vascular: Normal carotid pulses. No carotid bruit.   Cardiovascular:      Rate and Rhythm: Normal rate and regular rhythm.      Pulses: Normal pulses.      Heart sounds: Normal " heart sounds.   Pulmonary:      Effort: Pulmonary effort is normal.      Breath sounds: Normal breath sounds.   Abdominal:      Palpations: Abdomen is soft.      Tenderness: There is no abdominal tenderness.   Musculoskeletal:      Cervical back: Neck supple.      Right lower leg: No edema.      Left lower leg: No edema.   Lymphadenopathy:      Cervical: No cervical adenopathy.   Skin:     General: Skin is warm and dry.   Neurological:      General: No focal deficit present.      Mental Status: He is alert and oriented to person, place, and time.   Psychiatric:         Mood and Affect: Mood normal.         Behavior: Behavior normal.          Assessment:          ICD-10-CM ICD-9-CM   1. Essential (primary) hypertension  I10 401.9   2. Pure hypercholesterolemia  E78.00 272.0   3. Generalized anxiety disorder  F41.1 300.02   4. Chronic rhinitis  J31.0 472.0   5. Encounter for long-term (current) use of other medications  Z79.899 V58.69   6. Need for hepatitis C screening test  Z11.59 V73.89   7. History of colon polyps  Z86.010 V12.72   8. Chronic bilateral low back pain with left-sided sciatica  M54.42 724.2    G89.29 724.3     338.29        Plan:       Essential (primary) hypertension  -     Discontinue: lisinopriL (PRINIVIL,ZESTRIL) 40 MG tablet; Take 1 tablet (40 mg total) by mouth once daily.  Dispense: 90 tablet; Refill: 1  -     lisinopriL (PRINIVIL,ZESTRIL) 40 MG tablet; Take 1 tablet (40 mg total) by mouth once daily.  Dispense: 90 tablet; Refill: 3    Pure hypercholesterolemia  -     Lipid Panel; Future; Expected date: 04/12/2022    Generalized anxiety disorder  -     Discontinue: EScitalopram oxalate (LEXAPRO) 20 MG tablet; Take 1 tablet (20 mg total) by mouth once daily.  Dispense: 90 tablet; Refill: 1  -     EScitalopram oxalate (LEXAPRO) 20 MG tablet; Take 1 tablet (20 mg total) by mouth once daily.  Dispense: 90 tablet; Refill: 3    Chronic rhinitis  -     loratadine (CLARITIN) 10 mg tablet; Take 1  tablet (10 mg total) by mouth once daily.  Dispense: 90 tablet; Refill: 1    Encounter for long-term (current) use of other medications  -     CBC Auto Differential; Future; Expected date: 04/12/2022  -     Comprehensive Metabolic Panel; Future; Expected date: 04/12/2022  -     TSH; Future; Expected date: 04/12/2022    Need for hepatitis C screening test  -     Hepatitis C Antibody; Future; Expected date: 04/12/2022    History of colon polyps    Chronic bilateral low back pain with left-sided sciatica        Current Outpatient Medications:     albuterol (VENTOLIN HFA) 90 mcg/actuation inhaler, Inhale 2 puffs into the lungs every 4 (four) hours as needed for Wheezing or Shortness of Breath. Rescue, Disp: 18 g, Rfl: 1    cyclobenzaprine (FLEXERIL) 10 MG tablet, Take 10 mg by mouth every evening., Disp: , Rfl:     diclofenac sodium (VOLTAREN) 1 % Gel, Apply to back four times daily as needed for pain, Disp: 3000 g, Rfl: 3    docusate sodium (COLACE) 100 MG capsule, Take 1 capsule (100 mg total) by mouth 2 (two) times daily., Disp: 60 capsule, Rfl: 1    DULoxetine (CYMBALTA) 60 MG capsule, Take 1 capsule (60 mg total) by mouth 2 (two) times daily., Disp: 60 capsule, Rfl: 3    ezetimibe (ZETIA) 10 mg tablet, Take 1 tablet (10 mg total) by mouth once daily., Disp: 90 tablet, Rfl: 1    gabapentin (NEURONTIN) 600 MG tablet, Take 2 tablets (1,200 mg total) by mouth 2 (two) times daily., Disp: 360 tablet, Rfl: 0    HYDROcodone-acetaminophen (NORCO)  mg per tablet, Take 1 tablet by mouth 3 (three) times daily as needed for Pain., Disp: 90 tablet, Rfl: 0    pramipexole (MIRAPEX) 0.25 MG tablet, Take 1 tablet (0.25 mg total) by mouth 2 (two) times daily., Disp: 180 tablet, Rfl: 1    tamsulosin (FLOMAX) 0.4 mg Cap, Take 1 capsule (0.4 mg total) by mouth once daily., Disp: 90 capsule, Rfl: 1    traZODone (DESYREL) 100 MG tablet, Take 2 tablets (200 mg total) by mouth every evening., Disp: 60 tablet, Rfl: 0     EScitalopram oxalate (LEXAPRO) 20 MG tablet, Take 1 tablet (20 mg total) by mouth once daily., Disp: 90 tablet, Rfl: 3    lisinopriL (PRINIVIL,ZESTRIL) 40 MG tablet, Take 1 tablet (40 mg total) by mouth once daily., Disp: 90 tablet, Rfl: 3    loratadine (CLARITIN) 10 mg tablet, Take 1 tablet (10 mg total) by mouth once daily., Disp: 90 tablet, Rfl: 1      New & refilled meds:  Requested Prescriptions     Signed Prescriptions Disp Refills    loratadine (CLARITIN) 10 mg tablet 90 tablet 1     Sig: Take 1 tablet (10 mg total) by mouth once daily.    EScitalopram oxalate (LEXAPRO) 20 MG tablet 90 tablet 3     Sig: Take 1 tablet (20 mg total) by mouth once daily.    lisinopriL (PRINIVIL,ZESTRIL) 40 MG tablet 90 tablet 3     Sig: Take 1 tablet (40 mg total) by mouth once daily.     Labs - will notify of results  Med refills as needed  DASH diet, wt loss  LYLE for last colonoscopy report    RTC 6 mths for HTN; and as needed    Future Appointments   Date Time Provider Department Center   5/19/2022  3:00 PM James Robert MD [facility] [place]   10/12/2022  9:40 AM [facility] [place]        Signature:  [facility] - FAMILY MEDIC[facility]    Date of encounter: 4/12/22

## 2022-04-12 NOTE — PATIENT INSTRUCTIONS
Patient Education       Preventing Falls   The Basics   Written by the doctors and editors at [facility]   Am I at risk of falling? -- Your risk of falling increases as you grow older. That's because getting older can make it harder to walk steadily and keep your balance. Also, the effects of falls are more serious in older people.  Overall, 3 to 4 out of every 10 people over the age of 65 fall each year. Up to 75 percent of people who fracture a hip never recover to the point they were before they had their fracture. If you have fallen in the past, you are at higher risk of falling again.  Several things can increase your risk of a fall, including:  Illness  A change in the medicines you take  An unsafe or unfamiliar setting (for example, a room with rugs or furniture that might trip you, or an area you don't know well)  How can my doctor help me to avoid falling? -- Your doctor can talk to you about the following things:  Past falls - It is important to tell your doctor about any times you have fallen or almost fallen. He or she can then suggest ways to prevent another fall.  Your health conditions - Some health problems can put you at risk of falling. These include conditions that affect eyesight, hearing, muscle strength, or balance.  The medicines you take - Certain medicines can increase the risk of falling. These include some medicines that are used for sleeping problems, anxiety, high blood pressure, or depression. Adding new medicines, or changing doses of some medicines, can also affect your risk of falling.  The more your doctor knows about your situation, the better he or she will be able to help you. For example, if you fell because you have a condition that causes pain, your doctor might suggest treatments to deal with the pain. Or if one of your medicines is making you dizzy and more likely to fall, your doctor might switch you to a different medicine.  Is there anything I can do on my own? -- Yes. To  help keep from falling, you can:  Make your home safer - To avoid falling at home, get rid of things that might make you trip or slip. This might include furniture, electrical cords, clutter, and loose rugs (figure 1). Keep your home well-lit so that you can easily see where you are going. Avoid storing things in high places so you don't have to reach or climb.  Wear sturdy shoes that fit well - Wearing shoes with high heels or slippery soles, or shoes that are too loose, can lead to falls. Walking around in bare feet, or only socks, can also increase your risk of falling.  Take vitamin D pills - Taking vitamin D might lower the risk of falls in older people. This is because vitamin D helps make bones and muscles stronger. Your doctor can talk to you about whether you should take extra vitamin D, and how much.  Stay active - Exercising on a regular basis can help lower your risk of falling. It might also help prevent you from getting hurt if you do fall. It is best to do a few different activities that help with both strength and balance. There are many kinds of exercise that can be safe for older people. These include walking, swimming, and Faizan Chi (a Chinese martial art that involves slow, gentle movements).  Use a cane, walker, and other safety devices - If your doctor recommends that you use a cane or walker, be sure that it's the right size and you know how to use it. There are other devices that might help you avoid falling, too. These include grab bars or a sturdy seat for the shower, non-slip bath mats, and hand rails or treads for the stairs (to prevent slipping).  If you worry that you could fall, there are also alarm buttons that let you call for help if you fall and can't get up.  What should I do if I fall? -- If you fall, see your doctor right away, even if you aren't hurt. Your doctor can try to figure out what caused you to fall, and how likely you are to fall again. He or she will do an exam and  talk to you about your health problems, medicines, and activities. Then he or she can suggest things you can do to avoid falling again.  Many older people have a hard time recovering after a fall. Doing things to prevent falling can help you to protect your health and independence.  All topics are updated as new evidence becomes available and our peer review process is complete.  This topic retrieved from [employer] on: Sep 21, 2021.  Topic 34457 Version 18.0  Release: 29.4.2 - C29.263  © 2021 UpToDate, Inc. and/or its affiliates. All rights reserved.  figure 1: How to avoid falling at home     This picture shows some of the things that can cause a fall in your home. Look around and remove any loose rugs, electrical cords, clutter, or furniture that could trip you.  Graphic 95362 Version 1.0    Consumer Information Use and Disclaimer   This information is not specific medical advice and does not replace information you receive from your health care provider. This is only a brief summary of general information. It does NOT include all information about conditions, illnesses, injuries, tests, procedures, treatments, therapies, discharge instructions or life-style choices that may apply to you. You must talk with your health care provider for complete information about your health and treatment options. This information should not be used to decide whether or not to accept your health care provider's advice, instructions or recommendations. Only your health care provider has the knowledge and training to provide advice that is right for you. The use of this information is governed by the [employer] End User License Agreement, available at https://www.Scanntech.Netuitive/en/solutions/BioLeap/about/ghulam.The use of [employer] content is governed by the [employer] Terms of Use. ©2021 UpToDate, Inc. All rights reserved.  Copyright   © 2021 UpToDate, Inc. and/or its affiliates. All rights reserved.  Patient Education       Exercise  and Aging   General   Exercise can help older adults prevent falls and stay independent longer. Exercise may also help:  You have stronger muscles and bones and better balance  You lose weight and have more energy  Make your heart and lungs stronger  Lower your chance of health problems like heart disease, high blood sugar, or breast or colon cancer  Control conditions like high blood pressure and diabetes  You manage stress and get better sleep  Your mood, self-esteem, and self-image  How you think, plan, and pay attention  There are four types of exercise: endurance, strength, balance, and flexibility.  Endurance exercises get your heart rate up and keep it up for a while. Most people should get at least 30 minutes of exercise that gets your heart rate up on 5 or more days each week. Walking, swimming, biking, or going up and down stairs are kinds of exercises to get your heart rate up. You do not have to do all 30 minutes at one time. Try doing 10 minutes 3 times a day.  Strength exercises build muscle. Lifting weights or doing knee bends are kinds of strength exercises.  Balance exercises help to prevent falls. Raise up on your toes or stand on one leg as a kind of balance exercise.  Flexibility exercises move your joints through a full range of motion and stretch your muscles. Bend forward in a chair to stretch your back, do stretches, or bend and extend your arms or legs as a kind of flexibility exercise. Try doing 10 minutes twice a week.  Plan to include all these exercise types in your exercise program. Always check with your doctor before you start a new exercise program.  Some people do not like formal exercise classes, but there are many ways to work your muscles each day. Here are some things you can do.  Use steps instead of elevators.  Park far away in parking lots to walk farther.  Use the time while you wait. Do knee bends as you hold onto the kitchen counter while you wait for your coffee to  brew.  When you unload groceries, lift the bags or a container of milk a few times to exercise your arms and legs.  Walk the long way when you go somewhere.  After you walk to the bathroom, walk a few laps around the house before sitting down.  Try doing different standing exercises after you wash your hands each time in the bathroom.  Do balance exercises while you brush your teeth.  Do an exercise or get up and walk during TV commercials.  Don't forget to exercise small muscle groups like your hands, fingers, ankles, toes, and neck. Wiggle, bend, flex, and rotate these joints from left to right and back to front to help to keep these joints flexible.  When do I need to call the doctor?   Stop exercising and talk to your doctor if you have any of these problems:  Dizziness  Shortness of breath  Pain or pressure in the chest, arms, throat, jaw, or back  Nausea or vomiting  Blood clots  Infection  Joint swelling  Open wounds  Recent hip, back, or eye surgery  New problems that start during exercise  Helpful tips   If you have a health problem like heart disease or diabetes, talk with your doctor about the best exercises for you.  Always warm up before you stretch. Heated muscles stretch much easier than cool muscles. Try to walk or bike at an easy pace for a few minutes to warm up your muscles.  Slow your pace again after you exercise to cool down and bring your heart rate down slowly.  Be sure you do not hold your breath when you exercise because it can raise your blood pressure. If you tend to hold your breath, try to count out loud when you exercise.  Never bounce when doing stretches. Use slow and steady motions and hold your stretch for 20 to 30 seconds.  Have a routine. Doing exercises before a meal may be a good way to get into a routine.  Set small goals for yourself when you start to exercise. Use a chart to see how much you are doing. Ask someone to exercise with you.  Exercise may be slightly  "uncomfortable, but you should not have sharp pains. If you do get sharp pains, stop what you are doing. If the sharp pains continue, call your doctor.  Drink plenty of fluids without caffeine when you exercise and afterwards. Avoid outdoor exercise if it is too cold or too hot.  Wear the right clothes and shoes. Try layers of clothes, so that you can take them off if you get too hot. Shoes should fit well and support your feet.  Where can I learn more?   National [place] on Aging  https://www.kyaw.nih.gov/health/publication/exercise-physical-activity/introduction   Last Reviewed Date   2021-03-18  Consumer Information Use and Disclaimer   This information is not specific medical advice and does not replace information you receive from your health care provider. This is only a brief summary of general information. It does NOT include all information about conditions, illnesses, injuries, tests, procedures, treatments, therapies, discharge instructions or life-style choices that may apply to you. You must talk with your health care provider for complete information about your health and treatment options. This information should not be used to decide whether or not to accept your health care providers advice, instructions or recommendations. Only your health care provider has the knowledge and training to provide advice that is right for you.  Copyright   Copyright © 2021 UpToDate, Inc. and its affiliates and/or licensors. All rights reserved.  Patient Education       High Blood Pressure in Adults   The Basics   Written by the doctors and editors at [facility]   What is high blood pressure? -- High blood pressure is a condition that puts you at risk for heart attack, stroke, and kidney disease. It does not usually cause symptoms. But it can be serious.  When your doctor or nurse tells you your blood pressure, they will say 2 numbers. For instance, your doctor or nurse might say that your blood pressure is "130 over " "80." The top number is the pressure inside your arteries when your heart is roger. The bottom number is the pressure inside your arteries when your heart is relaxed.  "Elevated blood pressure" is a term doctors or nurses use as a warning. People with elevated blood pressure do not yet have high blood pressure. But their blood pressure is not as low as it should be for good health.  Many experts define high, elevated, and normal blood pressure as follows:  High - Top number of 130 or above and/or bottom number of 80 or above  Elevated - Top number between 120 and 129 and bottom number of 79 or below  Normal - Top number of 119 or below and bottom number of 79 or below  This information is also in the table (table 1).   How can I lower my blood pressure? -- If your doctor or nurse has prescribed blood pressure medicine, the most important thing you can do is to take it. If it causes side effects, do not just stop taking it. Instead, talk to your doctor or nurse about the problems it causes. They might be able to lower your dose or switch you to another medicine. If cost is a problem, mention that too. They might be able to put you on a less expensive medicine. Taking your blood pressure medicine can keep you from having a heart attack or stroke, and it can save your life!  Can I do anything on my own? -- You have a lot of control over your blood pressure. To lower it:  Lose weight (if you are overweight)  Choose a diet low in fat and rich in fruits, vegetables, and low-fat dairy products  Reduce the amount of salt you eat  Do something active for at least 30 minutes a day on most days of the week  Cut down on alcohol (if you drink more than 2 alcoholic drinks per day)  It's also a good idea to get a home blood pressure meter. People who check their own blood pressure at home do better at keeping it low and can sometimes even reduce the amount of medicine they take.  All topics are updated as new evidence becomes " "available and our peer review process is complete.  This topic retrieved from [employer] on: Sep 21, 2021.  Topic 04083 Version 15.0  Release: 29.4.2 - C29.263  © 2021 UpToDate, Inc. and/or its affiliates. All rights reserved.  table 1: Definition of normal and high blood pressure  Level  Top number  Bottom number    High 130 or above 80 or above   Elevated 120 to 129 79 or below   Normal 119 or below 79 or below   These definitions are from the American College of Cardiology/American Heart Association. Other expert groups might use slightly different definitions.  "Elevated blood pressure" is a term doctor or nurses use as a warning. It means you do not yet have high blood pressure, but your blood pressure is not as low as it should be for good health.  Graphic 74562 Version 6.0  Consumer Information Use and Disclaimer   This information is not specific medical advice and does not replace information you receive from your health care provider. This is only a brief summary of general information. It does NOT include all information about conditions, illnesses, injuries, tests, procedures, treatments, therapies, discharge instructions or life-style choices that may apply to you. You must talk with your health care provider for complete information about your health and treatment options. This information should not be used to decide whether or not to accept your health care provider's advice, instructions or recommendations. Only your health care provider has the knowledge and training to provide advice that is right for you. The use of this information is governed by the [employer] End User License Agreement, available at https://www.BlueYield.Thrupoint/en/solutions/In Motion Technology/about/ghulam.The use of [employer] content is governed by the [employer] Terms of Use. ©2021 UpToDate, Inc. All rights reserved.  Copyright   © 2021 UpToDate, Inc. and/or its affiliates. All rights reserved.  Patient Education       DASH Diet   About this " topic   DASH stands for Dietary Approaches to Stop Hypertension. The DASH diet may help you lower blood pressure. It may also help keep you from getting high blood pressure. You will eat less fat and more fiber on the DASH diet.  This diet gives you more minerals that fight high blood pressure. Some nutrients in this diet are:  Potassium ? Acts to help you get rid of salt. This may help to lower blood pressure.  Calcium ? Makes blood vessels and muscles work the right way  Magnesium - Helps blood vessels relax  Fiber ? Helps you feel full. It also helps digestion.  What will the results be?   The DASH diet may help you:  Lower your blood pressure and cholesterol  Lower your risk for cancer, heart disease, heart attack, and stroke. It may also lower your risk for heart failure, kidney stones, and diabetes.  Lose weight or keep a healthy weight  What lifestyle changes are needed?   Add regular exercise to get the most help from this diet.  Try to lower stress. Find ways to relax.  Stop smoking. Avoid secondhand smoke.  Limit alcohol intake.  What changes to diet are needed?   Know about poor eating habits. Then, you can fix them as you work with the program.  This diet encourages fruits and vegetables, whole grains, lean meats, healthy fats, and low-fat or fat-free dairy products.  This diet is lower in saturated fats, trans-fats, cholesterol, added sugars, and sodium.  Who should use this diet?   This eating plan is good for the whole family. It is also good for people with high blood pressure and those at risk for high blood pressure.  What foods are good to eat?   Grains: Try to eat 6 to 8 servings of whole grain, high fiber foods each day. These are bread, cereals, brown rice, or pasta.  Fruits and vegetables: Eat 4 to 5 servings each day. Try to pick many kinds and colors. Fresh or frozen are best. Look for low sodium or salt-free if you choose canned.  Dairy: Try to eat 2 to 3 servings of fat free and low fat  milk products each day.  Lean meats, poultry, and seafood: Try to eat 6 servings or less of lean meats, poultry, and seafood each day. Try to choose more low fat or lean meats like chicken and turkey. Eat less red meat. Eat more fish instead.  Nuts, seeds, and legumes (dry beans and peas): Try to eat 4 to 5 servings each week. Try to pick nuts such as almonds and walnuts, sunflower seeds, peanut butter, soy beans, lentils, kidney beans, and split peas.  Fats and oils: Try to eat 2 to 3 servings of fats and oils each day. Eat good fats found in fish, nuts, and avocados. Try using olive oil or vegetable oils such as canola oil. Other good oils to try are corn, safflower, sunflower, or soybean oils. Use low-sodium and low-fat salad dressing and mayonnaise.  Condiments: Pepper, herbs, spices, vinegar, lemon or lime juices are great for seasoning. Be careful to choose low-sodium or salt-free products if you use broths, soups, or soy sauce.  Sweets: Try to eat less than 5 servings each week. Choose low-fat and trans fat-free desserts. These are things like fruit flavored gelatin, sorbet, jelly beans, bret crackers, animal crackers, low-fat fig bars, and neal snaps. Eat fruit to satisfy your desire for sweets.     What foods should be limited or avoided?   Grains: Salted breads, rolls, crackers, quick breads, self-rising flours, biscuit mixes, regular bread crumbs, instant hot cereals, commercially-prepared rice, pasta, stuffing mixes  Fruits and vegetables: Commercially-prepared potatoes and vegetable mixes, regular canned vegetables and juices, vegetables frozen with sauce or pickled vegetables, processed fruits with salt or sodium  Milk: Whole milk, malted milk, chocolate milk, buttermilk, cheese, ice cream  Meats and beans: Smoked, cured, salted, or canned fish; meats or poultry such as galicia, sausages, sardines; high-fat cuts of meat like beef, lamb, or pork; chicken with the skin on it  Fats: Cut back on solid  fats like butter, lard, and margarine. Eat less food with high saturated fat, cholesterol and total fat.  Condiments and snacks: Salted and canned peas, beans, and olives; salted snack foods; fried foods; soda or other sweetened drinks  Sweets: High-fat baked goods such as muffins, donuts, pastries, commercial baked goods, candy bars  If you choose to drink alcohol, limit the amount you drink. Women should have 1 drink or less per day and men should have 2 drinks or less per day.  Helpful tips   Avoid eating canned vegetables and processed foods. These have a lot of salt in them. Look for a low-salt or low-sodium choice.  Try baking or broiling instead of frying food.  Write down the foods you eat. This will help you track what you have eaten each week.  When you go to a grocery store, have a list or a meal plan. Do not shop when you are hungry to avoid cravings for foods.  Read food labels with care. They will show you how much is in a serving. The amount is given as a percentage of the total amount you need each day. Reading labels will help you make healthy food choices.       Avoid fast foods.  Talk to your doctor or dietitian to see if you need vitamin and mineral supplements to help you balance your diet.  Talk to a dietitian for help.  Where can I learn more?   Academy of Nutrition and Dietetics  https://www.eatright.org/health/wellness/heart-and-cardiovascular-health/dash-diet-reducing-hypertension-through-diet-and-lifestyle   FamilyDoctor.org  http://familydoctor.org/familydoctor/en/prevention-wellness/food-nutrition/weight-loss/the-dash-diet-healthy-eating-to-control-your-blood-pressure.html   Last Reviewed Date   2021-03-15  Consumer Information Use and Disclaimer   This information is not specific medical advice and does not replace information you receive from your health care provider. This is only a brief summary of general information. It does NOT include all information about conditions, illnesses,  "injuries, tests, procedures, treatments, therapies, discharge instructions or life-style choices that may apply to you. You must talk with your health care provider for complete information about your health and treatment options. This information should not be used to decide whether or not to accept your health care providers advice, instructions or recommendations. Only your health care provider has the knowledge and training to provide advice that is right for you.  Copyright   Copyright © 2021 UpToDate, Inc. and its affiliates and/or licensors. All rights reserved.  Patient Education       Low Cholesterol, Saturated Fat, and Trans Fat Diet   About this topic   Cholesterol, saturated fat, and trans fat are in many foods. These may raise your blood cholesterol levels. If your cholesterol is too high, this can cause health problems in your heart, liver, kidneys, and even your eyes. The key to lowering your risk of heart problems is to lower your bad fat intake.  Saturated fats and trans fats are the bad fats. These fats clog your arteries and raise your bad cholesterol. Saturated fats and trans fats are solid fats at room temperature. Saturated fats are animal fats. Trans fats are manmade fats. They add flavor to a lot of packaged foods. Staying away from saturated and trans fats will help your heart.  When you do eat foods with fat, make sure they have the good fats. Monounsaturated and polyunsaturated fats are good fats. These fats help raise your good cholesterol and protect your heart.  General   How to Lower Fat and Cholesterol in Your Diet   Read the labels of the foods you buy from the market to find out how much fat is present. Under 5% of total fat on a label means it is "low fat". Over 20% of total fat on a label means it is high fat.  Eat high fiber foods, like soluble fiber. This type of fiber helps lower cholesterol in the body. Choose oatmeal, fruits (like apples), beans, and nuts to get the most " soluble fiber.  Eat foods high in omega-3 fatty acids like jaquelin seeds, walnuts, salmon, tuna, trout, herring, flaxseed, and soybeans. These foods help keep the heart healthy.  Limit your bad fat and oil intake.  Stay away from butter, stick margarine, shortening, lard, and palm and coconut oil. Pick plant-based spreads instead.  Limit mayonnaise, salad dressings, gravies, and sauces, unless it is made from low-fat ingredients.  Limit chocolate.  Do not eat high-fat processed foods like hot dogs, galicia, sausage, ham and other luncheon meats high in fat, and some frozen foods. Pick fish, chicken, turkey, and lean meats instead.  Eat more dried beans, lentils, and tofu to get your protein.  Do not eat organ meats, like liver.  Choose nonfat or low-fat milk, yogurt, and cheese.  Use light or fat-free cream cheese and sour cream.  Eat lots of fruits and vegetables.  Pick whole grain breads, cereals, pastas, and rice.  Do not eat snacks that are high in fats like granola, cookies, pies, pastries, doughnuts, and croissants.  Stay away from deep fried foods.  Help When Cooking   Remove the fat portion of meats and the skin from poultry before cooking.  Bake, broil, grill, poach, or roast poultry, fish, and lean meats.  Drain and throw away the fat that drains out of meat as you cook it.  Try to add little or no fat to foods.  Use olive or canola oil for cooking or baking.  Steam your vegetables.  Use herbs or no-oil marinades to flavor foods.         Who should use this diet?   This diet is for people who are at high risk of getting health problems like heart disease, high blood pressure, diabetes, and others. This diet is also good for all people to follow to keep your heart healthy.  What foods are good to eat?   Foods with good fats are:  Canola, peanut, and olive oil  Safflower, soybean, and corn oil  Walnuts, almonds, cashews, and peanuts  Pumpkin and sunflower seeds  [place] and tuna  Tofu  Soymilk  Avocado  What foods  should be limited or avoided?   Stay away from these types of foods that have saturated fats:  Whole fat dairy products like cheese, ice cream, whole milk, and cream  Palm and coconut oils  High-fat meats like beef, lamb, poultry with the skin, galicia, and sausage  Butter and lard  Stay away from these types of foods that may have trans fat:  Cookies, cakes, candy, doughnuts, baked goods, muffins, pizza dough, and pie crusts that are packaged  Fried foods  Frozen dinners  Chips and crackers  Microwave popcorn  Stick margarine and vegetable shortenings  Helpful tips   To help stay away from saturated fat:  Pick lean cuts of meat  Take the skin off chicken and turkey or pick skinless  Pick low-fat cheese, milk, and ice cream  Use liquid oils when cooking and baking, such as olive oil and canola oil  To help stay away from trans fat:  Look at your labels. Choose foods with 0% trans fat. Read the ingredient list. Avoid foods with partially hydrogenated oil in the ingredient list. This means there is trans fat in the product.  Where can I learn more?   American Heart Association   http://www.heart.org/HEARTORG/Conditions/Cholesterol/PreventionTreatmentofHighCholesterol/Know-Your-Fats_[facility]_305628_Article.jsp   Last Reviewed Date   2021-10-05  Consumer Information Use and Disclaimer   This information is not specific medical advice and does not replace information you receive from your health care provider. This is only a brief summary of general information. It does NOT include all information about conditions, illnesses, injuries, tests, procedures, treatments, therapies, discharge instructions or life-style choices that may apply to you. You must talk with your health care provider for complete information about your health and treatment options. This information should not be used to decide whether or not to accept your health care providers advice, instructions or recommendations. Only your health care provider has the  knowledge and training to provide advice that is right for you.   Copyright   Copyright © 2021 [employer] and its affiliates and/or licensors. All rights reserved.

## 2022-05-17 ENCOUNTER — TELEPHONE (OUTPATIENT)
Dept: FAMILY MEDICINE | Facility: CLINIC | Age: 72
End: 2022-05-17
Payer: MEDICARE

## 2022-05-17 DIAGNOSIS — F32.A DEPRESSION, UNSPECIFIED DEPRESSION TYPE: ICD-10-CM

## 2022-05-17 RX ORDER — DULOXETIN HYDROCHLORIDE 60 MG/1
60 CAPSULE, DELAYED RELEASE ORAL 2 TIMES DAILY
Qty: 10 CAPSULE | Refills: 0 | Status: CANCELLED | OUTPATIENT
Start: 2022-05-17

## 2022-05-17 NOTE — TELEPHONE ENCOUNTER
Pt requested. He is only needing a few to last until his mail order rx comes in.    Last seen 4/12/22 and has appt 10/12/22.

## 2022-05-17 NOTE — TELEPHONE ENCOUNTER
----- Message from Faviola Cortez sent at 5/17/2022  8:48 AM CDT -----  Pt is asking if he can get any Cymbalta until his prescription comes in Friday.      Jacky rosen     [phone]

## 2022-05-19 DIAGNOSIS — G62.9 NEUROPATHY: ICD-10-CM

## 2022-05-19 RX ORDER — GABAPENTIN 600 MG/1
1200 TABLET ORAL 2 TIMES DAILY
Qty: 360 TABLET | Refills: 1 | Status: SHIPPED | OUTPATIENT
Start: 2022-05-19 | End: 2023-06-05 | Stop reason: SDUPTHER

## 2022-05-19 NOTE — TELEPHONE ENCOUNTER
----- Message from Faviola Cortez sent at 5/19/2022  8:30 AM CDT -----  Patient needs a refill on gabapentin  called into [facility] pharmacy.   Please call patient at [phone]  if you have any questions. Thanks!

## 2022-05-20 ENCOUNTER — PATIENT OUTREACH (OUTPATIENT)
Dept: FAMILY MEDICINE | Facility: CLINIC | Age: 72
End: 2022-05-20
Payer: MEDICARE

## 2022-06-09 RX ORDER — TRAZODONE HYDROCHLORIDE 100 MG/1
200 TABLET ORAL NIGHTLY
Qty: 180 TABLET | Refills: 1 | Status: SHIPPED | OUTPATIENT
Start: 2022-06-09 | End: 2022-10-12 | Stop reason: SDUPTHER

## 2022-06-09 NOTE — TELEPHONE ENCOUNTER
----- Message from Faviola Cortez sent at 6/9/2022 11:50 AM CDT -----  Patient needs a refill on trazodone called into [facility].   Please call patient at [phone] if you have any questions. Thanks!        Pt asked if this could be sent as 90 day

## 2022-06-22 DIAGNOSIS — F32.A DEPRESSION, UNSPECIFIED DEPRESSION TYPE: ICD-10-CM

## 2022-06-22 RX ORDER — DULOXETIN HYDROCHLORIDE 60 MG/1
60 CAPSULE, DELAYED RELEASE ORAL 2 TIMES DAILY
Qty: 60 CAPSULE | Refills: 3 | Status: SHIPPED | OUTPATIENT
Start: 2022-06-22 | End: 2022-10-13 | Stop reason: SDUPTHER

## 2022-06-22 NOTE — TELEPHONE ENCOUNTER
----- Message from Liz Phelps sent at 6/22/2022  2:53 PM CDT -----  .Patient needs a refill on Dul[facility]etine [facility]  Please call patient at [phone] if you have any questions. Thanks!

## 2022-06-24 ENCOUNTER — PATIENT OUTREACH (OUTPATIENT)
Dept: FAMILY MEDICINE | Facility: CLINIC | Age: 72
End: 2022-06-24
Payer: MEDICARE

## 2022-07-25 ENCOUNTER — TELEPHONE (OUTPATIENT)
Dept: FAMILY MEDICINE | Facility: CLINIC | Age: 72
End: 2022-07-25
Payer: MEDICARE

## 2022-07-25 DIAGNOSIS — I10 ESSENTIAL (PRIMARY) HYPERTENSION: Chronic | ICD-10-CM

## 2022-07-25 DIAGNOSIS — J31.0 CHRONIC RHINITIS: Chronic | ICD-10-CM

## 2022-07-25 PROBLEM — M54.42 CHRONIC BILATERAL LOW BACK PAIN WITH LEFT-SIDED SCIATICA: Chronic | Status: ACTIVE | Noted: 2022-04-12

## 2022-07-25 PROBLEM — F41.1 GENERALIZED ANXIETY DISORDER: Chronic | Status: ACTIVE | Noted: 2022-04-12

## 2022-07-25 PROBLEM — G25.81 RLS (RESTLESS LEGS SYNDROME): Chronic | Status: ACTIVE | Noted: 2022-04-12

## 2022-07-25 PROBLEM — Z86.0100 HISTORY OF COLON POLYPS: Chronic | Status: ACTIVE | Noted: 2022-04-12

## 2022-07-25 PROBLEM — F32.A DEPRESSIVE DISORDER: Chronic | Status: ACTIVE | Noted: 2021-07-23

## 2022-07-25 PROBLEM — Z86.010 HISTORY OF COLON POLYPS: Chronic | Status: ACTIVE | Noted: 2022-04-12

## 2022-07-25 PROBLEM — G47.33 OSA ON CPAP: Chronic | Status: ACTIVE | Noted: 2022-04-12

## 2022-07-25 PROBLEM — G89.29 CHRONIC BILATERAL LOW BACK PAIN WITH LEFT-SIDED SCIATICA: Chronic | Status: ACTIVE | Noted: 2022-04-12

## 2022-07-25 PROBLEM — E78.00 PURE HYPERCHOLESTEROLEMIA: Chronic | Status: ACTIVE | Noted: 2021-07-23

## 2022-07-25 PROBLEM — G62.9 NEUROPATHY: Chronic | Status: ACTIVE | Noted: 2021-07-12

## 2022-07-25 RX ORDER — LORATADINE 10 MG/1
10 TABLET ORAL DAILY
Qty: 90 TABLET | Refills: 3 | Status: SHIPPED | OUTPATIENT
Start: 2022-07-25 | End: 2023-07-18 | Stop reason: SDUPTHER

## 2022-07-25 RX ORDER — LISINOPRIL 40 MG/1
40 TABLET ORAL DAILY
Qty: 90 TABLET | Refills: 3 | Status: SHIPPED | OUTPATIENT
Start: 2022-07-25 | End: 2023-07-18 | Stop reason: SDUPTHER

## 2022-07-25 NOTE — TELEPHONE ENCOUNTER
----- Message from Liz Phelps sent at 7/22/2022  8:04 AM CDT -----  Patient needs a refill on loratadine, lisinopriL called into [facility] pharmacy .  Please call patient at [phone] if you have any questions. Thanks!

## 2022-08-01 RX ORDER — TAMSULOSIN HYDROCHLORIDE 0.4 MG/1
0.4 CAPSULE ORAL DAILY
Qty: 90 CAPSULE | Refills: 3 | Status: SHIPPED | OUTPATIENT
Start: 2022-08-01 | End: 2023-06-26 | Stop reason: SDUPTHER

## 2022-08-01 RX ORDER — EZETIMIBE 10 MG/1
10 TABLET ORAL DAILY
Qty: 90 TABLET | Refills: 3 | Status: SHIPPED | OUTPATIENT
Start: 2022-08-01 | End: 2023-07-06 | Stop reason: ALTCHOICE

## 2022-08-01 NOTE — TELEPHONE ENCOUNTER
----- Message from Liz Phelps sent at 8/1/2022  8:52 AM CDT -----  Patient needs a refill on FLOMAX,ZETIA [facility] .  Please call patient at [phone] if you have any questions. Thanks!

## 2022-09-26 ENCOUNTER — TELEPHONE (OUTPATIENT)
Dept: FAMILY MEDICINE | Facility: CLINIC | Age: 72
End: 2022-09-26
Payer: MEDICARE

## 2022-09-26 NOTE — TELEPHONE ENCOUNTER
----- Message from Liz Phelps sent at 9/26/2022  3:45 PM CDT -----  Pt needing refill on HYDROcodone-acetaminophen [facility] Pt # [phone]

## 2022-09-27 RX ORDER — PRAMIPEXOLE DIHYDROCHLORIDE 0.25 MG/1
0.25 TABLET ORAL 2 TIMES DAILY
Qty: 180 TABLET | Refills: 1 | Status: SHIPPED | OUTPATIENT
Start: 2022-09-27 | End: 2023-09-11 | Stop reason: SDUPTHER

## 2022-09-27 NOTE — TELEPHONE ENCOUNTER
----- Message from Faviola Cortez sent at 9/27/2022 11:08 AM CDT -----  Patient needs a refill on mirapex called into [facility].  Please call patient at [phone] if you have any questions. Thanks!

## 2022-09-27 NOTE — TELEPHONE ENCOUNTER
Pt requested. Last seen 4/12/22. Has appt 10/12/22.    This was prescribed by [facility]. Pt states he has not been to him in years.

## 2022-09-29 RX ORDER — PRAMIPEXOLE DIHYDROCHLORIDE 0.25 MG/1
0.25 TABLET ORAL 2 TIMES DAILY
Qty: 180 TABLET | Refills: 3 | Status: SHIPPED | OUTPATIENT
Start: 2022-09-29 | End: 2022-10-12 | Stop reason: SDUPTHER

## 2022-09-30 DIAGNOSIS — R52 PAIN: ICD-10-CM

## 2022-09-30 RX ORDER — DICLOFENAC SODIUM 10 MG/G
GEL TOPICAL
Qty: 3000 G | Refills: 3 | Status: SHIPPED | OUTPATIENT
Start: 2022-09-30 | End: 2023-01-25 | Stop reason: SDUPTHER

## 2022-09-30 NOTE — TELEPHONE ENCOUNTER
----- Message from Liz Phelps sent at 9/29/2022  3:57 PM CDT -----  Pt needs refill on VOLTAREN [facility]  Pt # [phone]

## 2022-10-09 DIAGNOSIS — Z71.89 COMPLEX CARE COORDINATION: ICD-10-CM

## 2022-10-12 ENCOUNTER — OFFICE VISIT (OUTPATIENT)
Dept: FAMILY MEDICINE | Facility: CLINIC | Age: 72
End: 2022-10-12
Payer: MEDICARE

## 2022-10-12 VITALS
RESPIRATION RATE: 20 BRPM | DIASTOLIC BLOOD PRESSURE: 74 MMHG | TEMPERATURE: 98 F | OXYGEN SATURATION: 96 % | WEIGHT: 245.81 LBS | BODY MASS INDEX: 37.25 KG/M2 | HEIGHT: 68 IN | HEART RATE: 82 BPM | SYSTOLIC BLOOD PRESSURE: 118 MMHG

## 2022-10-12 DIAGNOSIS — Z12.5 PROSTATE CANCER SCREENING: ICD-10-CM

## 2022-10-12 DIAGNOSIS — R10.31 GROIN PAIN, RIGHT: ICD-10-CM

## 2022-10-12 DIAGNOSIS — Z87.891 FORMER SMOKER: ICD-10-CM

## 2022-10-12 DIAGNOSIS — E66.9 OBESITY, CLASS II, BMI 35-39.9: Chronic | ICD-10-CM

## 2022-10-12 DIAGNOSIS — Z79.899 ENCOUNTER FOR LONG-TERM (CURRENT) USE OF OTHER MEDICATIONS: ICD-10-CM

## 2022-10-12 DIAGNOSIS — I10 ESSENTIAL (PRIMARY) HYPERTENSION: Primary | Chronic | ICD-10-CM

## 2022-10-12 DIAGNOSIS — Z23 NEED FOR INFLUENZA VACCINATION: ICD-10-CM

## 2022-10-12 DIAGNOSIS — D64.9 MILD ANEMIA: ICD-10-CM

## 2022-10-12 LAB
ANION GAP SERPL CALCULATED.3IONS-SCNC: 14 MMOL/L (ref 7–16)
BASOPHILS # BLD AUTO: 0.04 K/UL (ref 0–0.2)
BASOPHILS NFR BLD AUTO: 0.5 % (ref 0–1)
BUN SERPL-MCNC: 19 MG/DL (ref 7–18)
BUN/CREAT SERPL: 20 (ref 6–20)
CALCIUM SERPL-MCNC: 9.3 MG/DL (ref 8.5–10.1)
CHLORIDE SERPL-SCNC: 105 MMOL/L (ref 98–107)
CO2 SERPL-SCNC: 23 MMOL/L (ref 21–32)
CREAT SERPL-MCNC: 0.94 MG/DL (ref 0.7–1.3)
DIFFERENTIAL METHOD BLD: ABNORMAL
EGFR (NO RACE VARIABLE) (RUSH/TITUS): 86 ML/MIN/1.73M²
EOSINOPHIL # BLD AUTO: 0.23 K/UL (ref 0–0.5)
EOSINOPHIL NFR BLD AUTO: 2.8 % (ref 1–4)
ERYTHROCYTE [DISTWIDTH] IN BLOOD BY AUTOMATED COUNT: 14 % (ref 11.5–14.5)
FERRITIN SERPL-MCNC: 61 NG/ML (ref 26–388)
GLUCOSE SERPL-MCNC: 112 MG/DL (ref 74–106)
HCT VFR BLD AUTO: 40.9 % (ref 40–54)
HGB BLD-MCNC: 13.4 G/DL (ref 13.5–18)
IMM GRANULOCYTES # BLD AUTO: 0.02 K/UL (ref 0–0.04)
IMM GRANULOCYTES NFR BLD: 0.2 % (ref 0–0.4)
IRON SATN MFR SERPL: 30 % (ref 14–50)
IRON SERPL-MCNC: 106 ΜG/DL (ref 65–175)
LYMPHOCYTES # BLD AUTO: 2.22 K/UL (ref 1–4.8)
LYMPHOCYTES NFR BLD AUTO: 27.2 % (ref 27–41)
MCH RBC QN AUTO: 30.3 PG (ref 27–31)
MCHC RBC AUTO-ENTMCNC: 32.8 G/DL (ref 32–36)
MCV RBC AUTO: 92.5 FL (ref 80–96)
MONOCYTES # BLD AUTO: 0.89 K/UL (ref 0–0.8)
MONOCYTES NFR BLD AUTO: 10.9 % (ref 2–6)
MPC BLD CALC-MCNC: 10.9 FL (ref 9.4–12.4)
NEUTROPHILS # BLD AUTO: 4.76 K/UL (ref 1.8–7.7)
NEUTROPHILS NFR BLD AUTO: 58.4 % (ref 53–65)
NRBC # BLD AUTO: 0 X10E3/UL
NRBC, AUTO (.00): 0 %
PLATELET # BLD AUTO: 244 K/UL (ref 150–400)
POTASSIUM SERPL-SCNC: 3.7 MMOL/L (ref 3.5–5.1)
PSA SERPL-MCNC: 1.14 NG/ML
RBC # BLD AUTO: 4.42 M/UL (ref 4.6–6.2)
SODIUM SERPL-SCNC: 138 MMOL/L (ref 136–145)
TIBC SERPL-MCNC: 348 ΜG/DL (ref 250–450)
WBC # BLD AUTO: 8.16 K/UL (ref 4.5–11)

## 2022-10-12 PROCEDURE — 80048 BASIC METABOLIC PANEL: ICD-10-PCS | Mod: ,,, | Performed by: CLINICAL MEDICAL LABORATORY

## 2022-10-12 PROCEDURE — G0103 PSA, SCREENING: ICD-10-PCS | Mod: ,,, | Performed by: CLINICAL MEDICAL LABORATORY

## 2022-10-12 PROCEDURE — 83550 IRON AND TIBC: ICD-10-PCS | Mod: ,,, | Performed by: CLINICAL MEDICAL LABORATORY

## 2022-10-12 PROCEDURE — 90694 FLU VACCINE - QUADRIVALENT - ADJUVANTED: ICD-10-PCS | Mod: ,,, | Performed by: NURSE PRACTITIONER

## 2022-10-12 PROCEDURE — 90694 VACC AIIV4 NO PRSRV 0.5ML IM: CPT | Mod: ,,, | Performed by: NURSE PRACTITIONER

## 2022-10-12 PROCEDURE — 99214 OFFICE O/P EST MOD 30 MIN: CPT | Mod: ,,, | Performed by: NURSE PRACTITIONER

## 2022-10-12 PROCEDURE — 85025 COMPLETE CBC W/AUTO DIFF WBC: CPT | Mod: ,,, | Performed by: CLINICAL MEDICAL LABORATORY

## 2022-10-12 PROCEDURE — G0103 PSA SCREENING: HCPCS | Mod: ,,, | Performed by: CLINICAL MEDICAL LABORATORY

## 2022-10-12 PROCEDURE — G0008 ADMIN INFLUENZA VIRUS VAC: HCPCS | Mod: ,,, | Performed by: NURSE PRACTITIONER

## 2022-10-12 PROCEDURE — 82728 ASSAY OF FERRITIN: CPT | Mod: ,,, | Performed by: CLINICAL MEDICAL LABORATORY

## 2022-10-12 PROCEDURE — 83540 IRON AND TIBC: ICD-10-PCS | Mod: ,,, | Performed by: CLINICAL MEDICAL LABORATORY

## 2022-10-12 PROCEDURE — 85025 CBC WITH DIFFERENTIAL: ICD-10-PCS | Mod: ,,, | Performed by: CLINICAL MEDICAL LABORATORY

## 2022-10-12 PROCEDURE — G0008 FLU VACCINE - QUADRIVALENT - ADJUVANTED: ICD-10-PCS | Mod: ,,, | Performed by: NURSE PRACTITIONER

## 2022-10-12 PROCEDURE — 99214 PR OFFICE/OUTPT VISIT, EST, LEVL IV, 30-39 MIN: ICD-10-PCS | Mod: ,,, | Performed by: NURSE PRACTITIONER

## 2022-10-12 PROCEDURE — 82728 FERRITIN: ICD-10-PCS | Mod: ,,, | Performed by: CLINICAL MEDICAL LABORATORY

## 2022-10-12 PROCEDURE — 83540 ASSAY OF IRON: CPT | Mod: ,,, | Performed by: CLINICAL MEDICAL LABORATORY

## 2022-10-12 PROCEDURE — 80048 BASIC METABOLIC PNL TOTAL CA: CPT | Mod: ,,, | Performed by: CLINICAL MEDICAL LABORATORY

## 2022-10-12 PROCEDURE — 83550 IRON BINDING TEST: CPT | Mod: ,,, | Performed by: CLINICAL MEDICAL LABORATORY

## 2022-10-12 RX ORDER — TRAZODONE HYDROCHLORIDE 100 MG/1
200 TABLET ORAL NIGHTLY
Qty: 180 TABLET | Refills: 1 | Status: SHIPPED | OUTPATIENT
Start: 2022-10-12 | End: 2022-10-20 | Stop reason: ALTCHOICE

## 2022-10-12 NOTE — PROGRESS NOTES
[facility] - FAMILY MEDICINE       PATIENT NAME: Chano Clement   : 1950    AGE: 71 y.o. DATE OF ENCOUNTER: 10/12/22    MRN: [id number]        Reason for Visit / Chief Complaint:  Follow-up (Pt presents for 6 month HTN follow up. He is not fasting.), Hypertension, and Groin Pain (Right groin pain x 6 months. He states the pain has worsened over the past two months. He denies being able to feel any mass/knot.)     Subjective:     HPI:    Presents for 6 mth f/u HTN.    C/o R groin pain x 6 mths    Former smoker, no prior AAA screen.    Review of Systems:     Review of Systems   Constitutional: Negative.    HENT: Negative.     Respiratory: Negative.     Cardiovascular: Negative.    Gastrointestinal: Negative.         Hx diverticulosis.  Occasional constipation, takes stool softener.   Genitourinary: Negative.  Difficulty urinating: a little slow at first, but okay.   Musculoskeletal:  Positive for arthralgias and back pain.   Skin: Negative.    Neurological: Negative.    Psychiatric/Behavioral:  Dysphoric mood: controlled with med.      Allergies:     Review of patient's allergies indicates:  No Known Allergies     Labs:     Lipids:   Lab Results   Component Value Date    CHOL 166 2022     Lab Results   Component Value Date    HDL 58 2022     Lab Results   Component Value Date    LDLCALC 95 2022     Lab Results   Component Value Date    TRIG 63 2022     CMP:  Sodium   Date Value Ref Range Status   2022 138 136 - 145 mmol/L Final     Potassium   Date Value Ref Range Status   2022 4.8 3.5 - 5.1 mmol/L Final     Chloride   Date Value Ref Range Status   2022 107 98 - 107 mmol/L Final     Glucose   Date Value Ref Range Status   2022 81 74 - 106 mg/dL Final     BUN   Date Value Ref Range Status   2022 26 (H) 7 - 18 mg/dL Final     Creatinine   Date Value Ref Range Status   2022 0.98 0.70 - 1.30 mg/dL Final     AST   Date Value Ref Range  "Status   2022 12 (L) 15 - 37 U/L Final     ALT   Date Value Ref Range Status   2022 26 16 - 61 U/L Final     eGFR   Date Value Ref Range Status   2022 80 >=60 mL/min/1.73m² Final      CBC:  Lab Results   Component Value Date    WBC 7.37 2022    RBC 4.33 (L) 2022    HGB 12.9 (L) 2022    HCT 41.7 2022     2022      TSH:  Lab Results   Component Value Date    TSH 1.250 2022       Medical History:     Past Medical History:   Diagnosis Date    Anxiety disorder, unspecified     Chronic bilateral low back pain with left-sided sciatica 2022    Chronic rhinitis 2022    Depressive disorder     Essential (primary) hypertension     GERD (gastroesophageal reflux disease)     History of colon polyps 2022    Hyperlipidemia     RAYMUNDO on CPAP     RLS (restless legs syndrome)     TIA (transient ischemic attack)       Social History     Tobacco Use   Smoking Status Former    Packs/day: 1.00    Years: 10.00    Pack years: 10.00    Types: Cigarettes    Quit date:     Years since quittin.8   Smokeless Tobacco Never      Past Surgical History:   Procedure Laterality Date    BACK SURGERY      Caudal HARPER  2013    Dr Rdz    FOOT SURGERY Left     Left l4-L5 TFESI Left 2012    Dr Rdz    SHOULDER ARTHROSCOPY      T3-T4 HARPER  2012    Dr Rdz    TONSILLECTOMY      UVULECTOMY        Objective:      Vitals:    10/12/22 1023   BP: 118/74   BP Location: Left arm   Patient Position: Sitting   BP Method: Large (Manual)   Pulse: 82   Resp: 20   Temp: 98.1 °F (36.7 °C)   TempSrc: Oral   SpO2: 96%   Weight: 111.5 kg (245 lb 12.8 oz)   Height: 5' 8" (1.727 m)     Body mass index is 37.37 kg/m².     Physical Exam:    Physical Exam  Vitals and nursing note reviewed.   Constitutional:       General: He is not in acute distress.     Appearance: Normal appearance.   HENT:      Head: Normocephalic.   Eyes:      Conjunctiva/sclera: Conjunctivae " normal.   Neck:      Thyroid: No thyromegaly or thyroid tenderness.      Trachea: Trachea normal.   Cardiovascular:      Rate and Rhythm: Normal rate and regular rhythm.      Pulses: Normal pulses.      Heart sounds: Normal heart sounds.   Pulmonary:      Effort: Pulmonary effort is normal. No respiratory distress.      Breath sounds: Normal breath sounds.   Abdominal:      Tenderness: There is no abdominal tenderness.   Musculoskeletal:      Cervical back: Neck supple.      Right hip: Bony tenderness (anterior/groin) present.      Right lower leg: No edema.      Left lower leg: No edema.   Lymphadenopathy:      Cervical: No cervical adenopathy.   Skin:     General: Skin is warm and dry.   Neurological:      Mental Status: He is alert and oriented to person, place, and time.   Psychiatric:         Mood and Affect: Mood normal.         Behavior: Behavior normal.        Assessment:          ICD-10-CM ICD-9-CM   1. Essential (primary) hypertension  I10 401.9   2. Obesity, Class II, BMI 35-39.9  E66.9 278.00   3. Need for influenza vaccination  Z23 V04.81   4. Former smoker  Z87.891 V15.82   5. Prostate cancer screening  Z12.5 V76.44   6. Mild anemia  D64.9 285.9   7. Encounter for long-term (current) use of other medications  Z79.899 V58.69   8. Groin pain, right  R10.31 789.03        Plan:       Essential (primary) hypertension  -     Basic Metabolic Panel; Future; Expected date: 10/12/2022    Obesity, Class II, BMI 35-39.9    Need for influenza vaccination  -     Influenza (FLUAD) - Quadrivalent (Adjuvanted) *Preferred* (65+) (PF)    Former smoker  -      AAA Screening; Future; Expected date: 10/12/2022    Prostate cancer screening  -     PSA, Screening; Future; Expected date: 10/12/2022    Mild anemia  -     CBC Auto Differential; Future; Expected date: 10/12/2022  -     Iron and TIBC; Future; Expected date: 10/12/2022  -     Ferritin; Future; Expected date: 10/12/2022    Encounter for long-term (current) use of  other medications  -     Basic Metabolic Panel; Future; Expected date: 10/12/2022    Groin pain, right  -     X-Ray Hip 2 or 3 views Right (with Pelvis when performed); Future; Expected date: 10/12/2022    Other orders  -     traZODone (DESYREL) 100 MG tablet; Take 2 tablets (200 mg total) by mouth every evening.  Dispense: 180 tablet; Refill: 1      Current Outpatient Medications:     albuterol (VENTOLIN HFA) 90 mcg/actuation inhaler, Inhale 2 puffs into the lungs every 4 (four) hours as needed for Wheezing or Shortness of Breath. Rescue, Disp: 18 g, Rfl: 1    cyclobenzaprine (FLEXERIL) 10 MG tablet, Take 10 mg by mouth every evening., Disp: , Rfl:     diclofenac sodium (VOLTAREN) 1 % Gel, Apply to back four times daily as needed for pain, Disp: 3000 g, Rfl: 3    docusate sodium (COLACE) 100 MG capsule, Take 1 capsule (100 mg total) by mouth 2 (two) times daily., Disp: 60 capsule, Rfl: 1    DULoxetine (CYMBALTA) 60 MG capsule, Take 1 capsule (60 mg total) by mouth 2 (two) times daily., Disp: 60 capsule, Rfl: 3    EScitalopram oxalate (LEXAPRO) 20 MG tablet, Take 1 tablet (20 mg total) by mouth once daily., Disp: 90 tablet, Rfl: 3    ezetimibe (ZETIA) 10 mg tablet, Take 1 tablet (10 mg total) by mouth once daily., Disp: 90 tablet, Rfl: 3    gabapentin (NEURONTIN) 600 MG tablet, Take 2 tablets (1,200 mg total) by mouth 2 (two) times daily., Disp: 360 tablet, Rfl: 1    HYDROcodone-acetaminophen (NORCO)  mg per tablet, Take 1 tablet by mouth 3 (three) times daily as needed for Pain., Disp: 90 tablet, Rfl: 0    lisinopriL (PRINIVIL,ZESTRIL) 40 MG tablet, Take 1 tablet (40 mg total) by mouth once daily., Disp: 90 tablet, Rfl: 3    loratadine (CLARITIN) 10 mg tablet, Take 1 tablet (10 mg total) by mouth once daily., Disp: 90 tablet, Rfl: 3    pramipexole (MIRAPEX) 0.25 MG tablet, Take 1 tablet (0.25 mg total) by mouth 2 (two) times daily. (Patient taking differently: Take 0.25 mg by mouth once daily.), Disp: 180  tablet, Rfl: 1    tamsulosin (FLOMAX) 0.4 mg Cap, Take 1 capsule (0.4 mg total) by mouth once daily., Disp: 90 capsule, Rfl: 3    traZODone (DESYREL) 100 MG tablet, Take 2 tablets (200 mg total) by mouth every evening., Disp: 180 tablet, Rfl: 1    New & refilled meds:  Requested Prescriptions     Signed Prescriptions Disp Refills    traZODone (DESYREL) 100 MG tablet 180 tablet 1     Sig: Take 2 tablets (200 mg total) by mouth every evening.     Schedule AAA screen.    Differentials for R groin pain:  OA hip, hernia, infection  [facility] if no significant arthritis will order US    Labs today     High dose flu shot  Reports having shingles vaccine in the past.  Current treatment plan is effective, no change in therapy.  Reviewed diet, exercise and weight control.    Return to clinic 6 mths for HTN, HLD, fasting labs; and f/u as needed.    Signature:  ARGENTINA [place]     Reviewed on 12/19/2022 by Caden Loyd MD

## 2022-10-13 DIAGNOSIS — F32.A DEPRESSION, UNSPECIFIED DEPRESSION TYPE: ICD-10-CM

## 2022-10-13 RX ORDER — DULOXETIN HYDROCHLORIDE 60 MG/1
60 CAPSULE, DELAYED RELEASE ORAL 2 TIMES DAILY
Qty: 180 CAPSULE | Refills: 3 | Status: SHIPPED | OUTPATIENT
Start: 2022-10-13 | End: 2022-10-24 | Stop reason: SDUPTHER

## 2022-10-13 NOTE — TELEPHONE ENCOUNTER
----- Message from Liz Phelps sent at 10/13/2022 10:32 AM CDT -----  Pt need [facility] on CYMBALTA [facility] and [facility]  3 Months refill if  he can Pt [phone]

## 2022-10-13 NOTE — TELEPHONE ENCOUNTER
Pt requested. Last seen 10/12/22. No appt scheduled. I will send message to get h is appt made.    6 mths for HTN, HLD, fasting labs

## 2022-10-20 ENCOUNTER — TELEPHONE (OUTPATIENT)
Dept: FAMILY MEDICINE | Facility: CLINIC | Age: 72
End: 2022-10-20
Payer: MEDICARE

## 2022-10-20 RX ORDER — TRAZODONE HYDROCHLORIDE 50 MG/1
200 TABLET ORAL NIGHTLY
Qty: 360 TABLET | Refills: 3 | Status: SHIPPED | OUTPATIENT
Start: 2022-10-20 | End: 2023-11-29 | Stop reason: SDUPTHER

## 2022-10-20 NOTE — TELEPHONE ENCOUNTER
----- Message from Faviola Cortez sent at 10/20/2022  8:19 AM CDT -----  Pt called and stated that the [facility] will not prescribe trazodone 100mg, they only have 50mg. They told him in order for it to be filled to 100mg, another prescription will have to be sent in and doubled.      [phone]

## 2022-10-20 NOTE — PROGRESS NOTES
Very mild glucose elevation, needs to increase water intake.  PSA norm.  Mild but slightly improved chronic anemia.

## 2022-10-24 ENCOUNTER — TELEPHONE (OUTPATIENT)
Dept: FAMILY MEDICINE | Facility: CLINIC | Age: 72
End: 2022-10-24
Payer: MEDICARE

## 2022-10-24 DIAGNOSIS — F32.A DEPRESSION, UNSPECIFIED DEPRESSION TYPE: ICD-10-CM

## 2022-10-24 RX ORDER — DULOXETIN HYDROCHLORIDE 60 MG/1
60 CAPSULE, DELAYED RELEASE ORAL 2 TIMES DAILY
Qty: 180 CAPSULE | Refills: 3 | Status: SHIPPED | OUTPATIENT
Start: 2022-10-24 | End: 2024-02-16 | Stop reason: SDUPTHER

## 2022-10-24 NOTE — TELEPHONE ENCOUNTER
DULoxetine (CYMBALTA) 60 mg 2 times daily  Summary: Take 1 capsule (60 mg total) by mouth 2 (two) times daily., Starting Thu 10/13/2022, Normal   Dose, Route, Frequency: 60 mg, Oral, 2 times daily  Start: 10/13/2022  Ord/Sold: 10/13/2022 (O)  Report  Long-term:   Pharmacy: [facility], [address] Dose History        Patient Sig: Take 1 capsule (60 mg total) by mouth 2 (two) times daily.       Ordered on: 10/13/2022       Authorized by: TARA PARIS       Dispense: 180 capsule       Refills: 3 ordered        Sending rx again in case there was an escript transmission error.  Ms sent to Liz Phelps to let pt know.

## 2022-10-24 NOTE — TELEPHONE ENCOUNTER
----- Message from Liz Phelps sent at 10/21/2022  9:14 AM CDT -----  Pt need refill on CYMBALTA if he could he like to get 3 months refill on it so he don't have to call ever month for [facility]ill [facility] Pt # [phone]

## 2022-11-21 ENCOUNTER — HOSPITAL ENCOUNTER (OUTPATIENT)
Dept: RADIOLOGY | Facility: HOSPITAL | Age: 72
Discharge: HOME | End: 2022-11-21
Attending: NURSE PRACTITIONER
Payer: MEDICARE

## 2022-11-21 DIAGNOSIS — Z87.891 FORMER SMOKER: ICD-10-CM

## 2022-11-21 PROCEDURE — 76706 US AAA SCREENING: ICD-10-PCS | Mod: 26,,, | Performed by: STUDENT IN AN ORGANIZED HEALTH CARE EDUCATION/TRAINING PROGRAM

## 2022-11-21 PROCEDURE — 76706 US ABDL AORTA SCREEN AAA: CPT | Mod: 26,,, | Performed by: STUDENT IN AN ORGANIZED HEALTH CARE EDUCATION/TRAINING PROGRAM

## 2022-11-21 PROCEDURE — 76706 US ABDL AORTA SCREEN AAA: CPT | Mod: TC

## 2022-11-29 DIAGNOSIS — R09.82 POST-NASAL DRAINAGE: Primary | ICD-10-CM

## 2022-11-29 NOTE — PROGRESS NOTES
He is a former smoker w/ chronic changes noted on a prior CXR May 2020 & increased secretions often goes along w/ this history.  I can refer him to ENT for further eval if desired.

## 2022-12-05 ENCOUNTER — OFFICE VISIT (OUTPATIENT)
Dept: OTOLARYNGOLOGY | Facility: CLINIC | Age: 72
End: 2022-12-05
Payer: MEDICARE

## 2022-12-05 VITALS — WEIGHT: 245 LBS | HEIGHT: 68 IN | BODY MASS INDEX: 37.13 KG/M2

## 2022-12-05 DIAGNOSIS — K21.9 GASTROESOPHAGEAL REFLUX DISEASE WITHOUT ESOPHAGITIS: Primary | ICD-10-CM

## 2022-12-05 DIAGNOSIS — R09.82 POST-NASAL DRAINAGE: ICD-10-CM

## 2022-12-05 PROCEDURE — 99204 OFFICE O/P NEW MOD 45 MIN: CPT | Mod: S$PBB,,, | Performed by: OTOLARYNGOLOGY

## 2022-12-05 PROCEDURE — 99214 OFFICE O/P EST MOD 30 MIN: CPT | Mod: PBBFAC | Performed by: OTOLARYNGOLOGY

## 2022-12-05 PROCEDURE — 99204 PR OFFICE/OUTPT VISIT, NEW, LEVL IV, 45-59 MIN: ICD-10-PCS | Mod: S$PBB,,, | Performed by: OTOLARYNGOLOGY

## 2022-12-05 NOTE — PROGRESS NOTES
Subjective:       Patient ID: Chano Clement is a 71 y.o. male.    Chief Complaint: Sinusitis (Pt states he coughs up thick off white mucus, worse in the am.)    Sinusitis    Review of Systems   HENT:  Positive for postnasal drip and rhinorrhea.    All other systems reviewed and are negative.    Objective:      Physical Exam  General: NAD  Head: Normocephalic, atraumatic, no facial asymmetry/normal strength,  Ears: Both auricules normal in appearance, w/o deformities tympanic membranes normal external auditory canals normal  Nose: External nose w/o deformities normal turbinates no drainage or inflammation  Oral Cavity: Lips, gums, floor of mouth, tongue hard palate, and buccal mucosa without mass/lesion  Oropharynx: Mucosa pink and moist, soft palate, posterior pharynx and oropharyngeal wall without mass/lesion uvula absent   Neck: Supple, symmetric, trachea midline, no palpable mass/lesion, no palpable cervical lymphadenopathy  Skin: Warm and dry, no concerning lesions  Respiratory: Respirations even, unlabored   Assessment:       1. Gastroesophageal reflux disease without esophagitis    2. Post-nasal drainage          Plan:       Mostly from Reflux   Mucinex prn for severe congestion  May need to see pulmonary

## 2022-12-22 NOTE — PROGRESS NOTES
[facility]      PATIENT NAME: Chano Clement   : 1950    AGE: 72 y.o. DATE: 2022   MRN: [id number]        Reason for Visit / Chief Complaint: Medicare AWV (Subsequent Medicare AWV visit )        Chano Clement presents for a Subsequent Medicare AWV today.    Review of Systems   Constitutional: Negative.    Respiratory: Negative.     Cardiovascular: Negative.    Neurological: Negative.       The following components were reviewed and updated:      Medical/Social/Family History:  Past Medical History:   Diagnosis Date    Anxiety disorder, unspecified     Chronic bilateral low back pain with left-sided sciatica 2022    Chronic rhinitis 2022    Depressive disorder     Essential (primary) hypertension     GERD (gastroesophageal reflux disease)     History of colon polyps 2022    Hyperlipidemia     RAYMUNDO on CPAP     RLS (restless legs syndrome)     TIA (transient ischemic attack)         Family History   Problem Relation Age of Onset    Heart disease Mother     Rheumatic fever Mother     Heart disease Father     Heart attack Father     No Known Problems Sister     Arthritis Sister     No Known Problems Brother     No Known Problems Brother     Hyperlipidemia Son     Hypertension Son     No Known Problems Maternal Grandmother     No Known Problems Maternal Grandfather     No Known Problems Paternal Grandmother     No Known Problems Paternal Grandfather         Past Surgical History:   Procedure Laterality Date    BACK SURGERY      Caudal HARPER  2013    Dr Rdz    FOOT SURGERY Left     Left l4-L5 TFESI Left 2012    Dr Rdz    SHOULDER ARTHROSCOPY      T3-T4 HARPER  2012    Dr Rdz    TONSILLECTOMY      UVULECTOMY         Social History     Tobacco Use   Smoking Status Former    Packs/day: 1.00    Years: 10.00    Pack years: 10.00    Types: Cigarettes    Quit date:     Years since quittin.0   Smokeless Tobacco Never        Social History     Substance and Sexual Activity   Alcohol Use Yes    Comment: Occasionally         Allergies and Current Medications   Review of patient's allergies indicates:  No Known Allergies    Current Outpatient Medications:     albuterol (VENTOLIN HFA) 90 mcg/actuation inhaler, Inhale 2 puffs into the lungs every 4 (four) hours as needed for Wheezing or Shortness of Breath. Rescue, Disp: 18 g, Rfl: 1    cyclobenzaprine (FLEXERIL) 10 MG tablet, Take 10 mg by mouth every evening., Disp: , Rfl:     diclofenac sodium (VOLTAREN) 1 % Gel, Apply to back four times daily as needed for pain, Disp: 3000 g, Rfl: 3    docusate sodium (COLACE) 100 MG capsule, Take 1 capsule (100 mg total) by mouth 2 (two) times daily., Disp: 60 capsule, Rfl: 1    DULoxetine (CYMBALTA) 60 MG capsule, Take 1 capsule (60 mg total) by mouth 2 (two) times daily., Disp: 180 capsule, Rfl: 3    EScitalopram oxalate (LEXAPRO) 20 MG tablet, Take 1 tablet (20 mg total) by mouth once daily., Disp: 90 tablet, Rfl: 3    ezetimibe (ZETIA) 10 mg tablet, Take 1 tablet (10 mg total) by mouth once daily., Disp: 90 tablet, Rfl: 3    gabapentin (NEURONTIN) 600 MG tablet, Take 2 tablets (1,200 mg total) by mouth 2 (two) times daily., Disp: 360 tablet, Rfl: 1    HYDROcodone-acetaminophen (NORCO)  mg per tablet, Take 1 tablet by mouth 3 (three) times daily as needed for Pain., Disp: 90 tablet, Rfl: 0    lisinopriL (PRINIVIL,ZESTRIL) 40 MG tablet, Take 1 tablet (40 mg total) by mouth once daily., Disp: 90 tablet, Rfl: 3    loratadine (CLARITIN) 10 mg tablet, Take 1 tablet (10 mg total) by mouth once daily., Disp: 90 tablet, Rfl: 3    pramipexole (MIRAPEX) 0.25 MG tablet, Take 1 tablet (0.25 mg total) by mouth 2 (two) times daily. (Patient taking differently: Take 0.25 mg by mouth once daily.), Disp: 180 tablet, Rfl: 1    tamsulosin (FLOMAX) 0.4 mg Cap, Take 1 capsule (0.4 mg total) by mouth once daily., Disp: 90 capsule, Rfl: 3    traZODone (DESYREL)  50 MG tablet, Take 4 tablets (200 mg total) by mouth every evening., Disp: 360 tablet, Rfl: 3      Health Risk Assessment   Fall Risk:  not at risk   Obesity: BMI Body mass index is 35.01 kg/m².   Advance Directive: no but information given   Depression: PHQ9- 0   HTN:  DASH diet, exercise, weight management, med compliance, home BP monitoring, and follow-up discussed.   T2DM: no  STI: not at risk   Statin Use: no      Health Maintenance   Last eye exam: saw Dr. Marrufo 22   Last CV screen with lipids: 22   Diabetes screening with fasting glucose or A1c: 10/12/22   Colonoscopy: LYLE faxed to Dr. Brito   Flu Vaccine: 10/12/22   Pneumonia vaccines: Pneu 20 -19   COVID vaccine: declined   Hep B vaccine: na   DEXA: na   Last PSA screen: 10/12/22   AAA screenin22  HIV Screening: not at risk  Hepatitis C Screen: 22 non-reactive  Low Dose CT Scan: na    Health Maintenance Topics with due status: Not Due       Topic Last Completion Date    TETANUS VACCINE 01/10/2017    Colorectal Cancer Screening 05/10/2021    Lipid Panel 2022    Eye Exam 2022    PROSTATE-SPECIFIC ANTIGEN 10/12/2022     There are no preventive care reminders to display for this patient.      Lab results available in Epic or see dates from [facility] above:   Lab Results   Component Value Date    CHOL 166 2022    CHOL 186 2021     Lab Results   Component Value Date    HDL 58 2022    HDL 61 (H) 2021     Lab Results   Component Value Date    LDLCALC 95 2022    LDLCALC 107 2021     Lab Results   Component Value Date    TRIG 63 2022    TRIG 88 2021     Lab Results   Component Value Date    CHOLHDL 2.9 2022    CHOLHDL 3.0 2021       No results found for: LABA1C, HGBA1C    Sodium   Date Value Ref Range Status   10/12/2022 138 136 - 145 mmol/L Final     Potassium   Date Value Ref Range Status   10/12/2022 3.7 3.5 - 5.1 mmol/L Final     Chloride   Date Value Ref Range  "Status   10/12/2022 105 98 - 107 mmol/L Final     CO2   Date Value Ref Range Status   10/12/2022 23 21 - 32 mmol/L Final     Glucose   Date Value Ref Range Status   10/12/2022 112 (H) 74 - 106 mg/dL Final     BUN   Date Value Ref Range Status   10/12/2022 19 (H) 7 - 18 mg/dL Final     Creatinine   Date Value Ref Range Status   10/12/2022 0.94 0.70 - 1.30 mg/dL Final     Calcium   Date Value Ref Range Status   10/12/2022 9.3 8.5 - 10.1 mg/dL Final     Total Protein   Date Value Ref Range Status   04/12/2022 6.9 6.4 - 8.2 g/dL Final     Albumin   Date Value Ref Range Status   04/12/2022 3.8 3.5 - 5.0 g/dL Final     Bilirubin, Total   Date Value Ref Range Status   04/12/2022 0.3 0.0 - 1.2 mg/dL Final     Alk Phos   Date Value Ref Range Status   04/12/2022 72 45 - 115 U/L Final     AST   Date Value Ref Range Status   04/12/2022 12 (L) 15 - 37 U/L Final     ALT   Date Value Ref Range Status   04/12/2022 26 16 - 61 U/L Final     Anion Gap   Date Value Ref Range Status   10/12/2022 14 7 - 16 mmol/L Final     eGFR   Date Value Ref Range Status   04/12/2022 80 >=60 mL/min/1.73m² Final         Lab Results   Component Value Date    PSA 1.140 10/12/2022    PSA 1.220 07/23/2021          Incontinence  Bowel: no  Bladder: no      Care Team  SHAHID Wright FNP -PCP                 Dr. Marrufo - Ophthalmology                 Dr. Craig-pain treatment    **See Completed Assessments for Annual Wellness visit within the encounter summary    The following assessments were completed & reviewed:  Depression Screening  Cognitive function Screening  Timed Get Up Test  Whisper Test  Vision Screen  Health Risk Assessment  Checklist of ADLs and IADLs        Objective  Vitals:    12/29/22 1339 12/29/22 1343   BP: (!) 152/90 138/80   Pulse: 76    Resp: 16    Temp: 98.5 °F (36.9 °C)    TempSrc: Oral    SpO2: 95%    Weight: 110.7 kg (244 lb)    Height: 5' 10" (1.778 m)    PainSc:   5       Body mass index is 35.01 kg/m².  Ideal body weight: 73 kg " (160 lb 15 oz)       Physical Exam  Vitals and nursing note reviewed.   Constitutional:       General: He is not in acute distress.     Appearance: Normal appearance. He is not ill-appearing.   HENT:      Head: Normocephalic.   Eyes:      Conjunctiva/sclera: Conjunctivae normal.   Cardiovascular:      Rate and Rhythm: Normal rate and regular rhythm.      Pulses: Normal pulses.      Heart sounds: Normal heart sounds.   Pulmonary:      Effort: Pulmonary effort is normal. No respiratory distress.      Breath sounds: Normal breath sounds.   Musculoskeletal:      Cervical back: Neck supple.      Right lower leg: No edema.      Left lower leg: No edema.   Skin:     General: Skin is warm and dry.   Neurological:      Mental Status: He is alert and oriented to person, place, and time.         Assessment:     1. Encounter for subsequent annual wellness visit (AWV) in Medicare patient    2. Essential (primary) hypertension    3. Pure hypercholesterolemia    4. BMI 35.0-35.9,adult         Plan:    Discussed and provided with a screening schedule and personal prevention plan in accordance with USPSTF age appropriate recommendations and Medicare screening guidelines.   Education, counseling, and referrals were provided as needed.  After Visit Summary printed and given to patient which includes written education and a list of any referrals if indicated.     Education including diet, exercise, falls, preventive health care for older adults and advanced directives discussed with patient and patient verbalized understanding.      F/u plan for yearly AWV.    Signature: Calista ELAINE

## 2022-12-29 ENCOUNTER — OFFICE VISIT (OUTPATIENT)
Dept: FAMILY MEDICINE | Facility: CLINIC | Age: 72
End: 2022-12-29
Payer: MEDICARE

## 2022-12-29 VITALS
DIASTOLIC BLOOD PRESSURE: 80 MMHG | WEIGHT: 244 LBS | OXYGEN SATURATION: 95 % | HEIGHT: 70 IN | HEART RATE: 76 BPM | BODY MASS INDEX: 34.93 KG/M2 | RESPIRATION RATE: 16 BRPM | TEMPERATURE: 99 F | SYSTOLIC BLOOD PRESSURE: 138 MMHG

## 2022-12-29 DIAGNOSIS — E78.00 PURE HYPERCHOLESTEROLEMIA: Chronic | ICD-10-CM

## 2022-12-29 DIAGNOSIS — I10 ESSENTIAL (PRIMARY) HYPERTENSION: Chronic | ICD-10-CM

## 2022-12-29 DIAGNOSIS — Z00.00 ENCOUNTER FOR SUBSEQUENT ANNUAL WELLNESS VISIT (AWV) IN MEDICARE PATIENT: Primary | ICD-10-CM

## 2022-12-29 PROCEDURE — G0439 PR MEDICARE ANNUAL WELLNESS SUBSEQUENT VISIT: ICD-10-PCS | Mod: ,,, | Performed by: NURSE PRACTITIONER

## 2022-12-29 PROCEDURE — G0439 PPPS, SUBSEQ VISIT: HCPCS | Mod: ,,, | Performed by: NURSE PRACTITIONER

## 2022-12-29 NOTE — PATIENT INSTRUCTIONS
Counseling and Referral of Other Preventative  (Italic type indicates deductible and co-insurance are waived)    Patient Name: Chano Clement  Today's Date: 12/29/2022    Health Maintenance         Date Due Completion Date    Shingles Vaccine (1 of 2) 10/12/2023 (Originally 12/29/1999) ---    COVID-19 Vaccine (3 - Booster for Moderna series) 10/12/2023 (Originally 4/26/2021) 3/1/2021    Eye Exam 05/31/2023 5/31/2022 (Done)    Override on 5/31/2022: Done ([facility])    PROSTATE-SPECIFIC ANTIGEN 10/12/2023 10/12/2022    Colorectal Cancer Screening 05/10/2026 5/10/2021    TETANUS VACCINE 01/10/2027 1/10/2017    Lipid Panel 04/12/2027 4/12/2022          No orders of the defined types were placed in this encounter.

## 2023-01-07 ENCOUNTER — HOSPITAL ENCOUNTER (EMERGENCY)
Facility: HOSPITAL | Age: 73
Discharge: HOME | End: 2023-01-07
Payer: MEDICARE

## 2023-01-07 VITALS
DIASTOLIC BLOOD PRESSURE: 89 MMHG | OXYGEN SATURATION: 96 % | BODY MASS INDEX: 35.79 KG/M2 | RESPIRATION RATE: 16 BRPM | TEMPERATURE: 98 F | HEIGHT: 70 IN | SYSTOLIC BLOOD PRESSURE: 181 MMHG | WEIGHT: 250 LBS | HEART RATE: 82 BPM

## 2023-01-07 DIAGNOSIS — R10.84 GENERALIZED ABDOMINAL PAIN: Primary | ICD-10-CM

## 2023-01-07 DIAGNOSIS — K59.03 DRUG-INDUCED CONSTIPATION: ICD-10-CM

## 2023-01-07 LAB
ALBUMIN SERPL BCP-MCNC: 3.9 G/DL (ref 3.5–5)
ALBUMIN/GLOB SERPL: 1.2 {RATIO}
ALP SERPL-CCNC: 92 U/L (ref 45–115)
ALT SERPL W P-5'-P-CCNC: 22 U/L (ref 16–61)
ANION GAP SERPL CALCULATED.3IONS-SCNC: 16 MMOL/L (ref 7–16)
AST SERPL W P-5'-P-CCNC: 17 U/L (ref 15–37)
BASOPHILS # BLD AUTO: 0.05 K/UL (ref 0–0.2)
BASOPHILS NFR BLD AUTO: 0.5 % (ref 0–1)
BILIRUB SERPL-MCNC: 0.4 MG/DL (ref ?–1.2)
BILIRUB UR QL STRIP: NEGATIVE
BUN SERPL-MCNC: 18 MG/DL (ref 7–18)
BUN/CREAT SERPL: 18 (ref 6–20)
CALCIUM SERPL-MCNC: 9.2 MG/DL (ref 8.5–10.1)
CHLORIDE SERPL-SCNC: 104 MMOL/L (ref 98–107)
CLARITY UR: CLEAR
CO2 SERPL-SCNC: 25 MMOL/L (ref 21–32)
COLOR UR: YELLOW
CREAT SERPL-MCNC: 0.98 MG/DL (ref 0.7–1.3)
DIFFERENTIAL METHOD BLD: ABNORMAL
EGFR (NO RACE VARIABLE) (RUSH/TITUS): 81 ML/MIN/1.73M²
EOSINOPHIL # BLD AUTO: 0.29 K/UL (ref 0–0.5)
EOSINOPHIL NFR BLD AUTO: 2.6 % (ref 1–4)
ERYTHROCYTE [DISTWIDTH] IN BLOOD BY AUTOMATED COUNT: 14.1 % (ref 11.5–14.5)
GLOBULIN SER-MCNC: 3.2 G/DL (ref 2–4)
GLUCOSE SERPL-MCNC: 167 MG/DL (ref 74–106)
GLUCOSE UR STRIP-MCNC: NORMAL MG/DL
HCT VFR BLD AUTO: 44.2 % (ref 40–54)
HGB BLD-MCNC: 14.3 G/DL (ref 13.5–18)
IMM GRANULOCYTES # BLD AUTO: 0.03 K/UL (ref 0–0.04)
IMM GRANULOCYTES NFR BLD: 0.3 % (ref 0–0.4)
KETONES UR STRIP-SCNC: NEGATIVE MG/DL
LEUKOCYTE ESTERASE UR QL STRIP: NEGATIVE
LIPASE SERPL-CCNC: 109 U/L (ref 73–393)
LYMPHOCYTES # BLD AUTO: 2.52 K/UL (ref 1–4.8)
LYMPHOCYTES NFR BLD AUTO: 22.9 % (ref 27–41)
MCH RBC QN AUTO: 29.5 PG (ref 27–31)
MCHC RBC AUTO-ENTMCNC: 32.4 G/DL (ref 32–36)
MCV RBC AUTO: 91.1 FL (ref 80–96)
MONOCYTES # BLD AUTO: 0.81 K/UL (ref 0–0.8)
MONOCYTES NFR BLD AUTO: 7.4 % (ref 2–6)
MPC BLD CALC-MCNC: 10.2 FL (ref 9.4–12.4)
NEUTROPHILS # BLD AUTO: 7.32 K/UL (ref 1.8–7.7)
NEUTROPHILS NFR BLD AUTO: 66.3 % (ref 53–65)
NITRITE UR QL STRIP: NEGATIVE
NRBC # BLD AUTO: 0 X10E3/UL
NRBC, AUTO (.00): 0 %
PH UR STRIP: 5.5 PH UNITS
PLATELET # BLD AUTO: 264 K/UL (ref 150–400)
POTASSIUM SERPL-SCNC: 3.9 MMOL/L (ref 3.5–5.1)
PROT SERPL-MCNC: 7.1 G/DL (ref 6.4–8.2)
PROT UR QL STRIP: 10
RBC # BLD AUTO: 4.85 M/UL (ref 4.6–6.2)
RBC # UR STRIP: NEGATIVE /UL
SODIUM SERPL-SCNC: 141 MMOL/L (ref 136–145)
SP GR UR STRIP: 1.04
UROBILINOGEN UR STRIP-ACNC: NORMAL MG/DL
WBC # BLD AUTO: 11.02 K/UL (ref 4.5–11)

## 2023-01-07 PROCEDURE — 63600175 PHARM REV CODE 636 W HCPCS: Performed by: NURSE PRACTITIONER

## 2023-01-07 PROCEDURE — 96375 TX/PRO/DX INJ NEW DRUG ADDON: CPT

## 2023-01-07 PROCEDURE — 85025 COMPLETE CBC W/AUTO DIFF WBC: CPT | Performed by: NURSE PRACTITIONER

## 2023-01-07 PROCEDURE — 25500020 PHARM REV CODE 255: Performed by: NURSE PRACTITIONER

## 2023-01-07 PROCEDURE — 83690 ASSAY OF LIPASE: CPT | Performed by: NURSE PRACTITIONER

## 2023-01-07 PROCEDURE — 80053 COMPREHEN METABOLIC PANEL: CPT | Performed by: NURSE PRACTITIONER

## 2023-01-07 PROCEDURE — 99284 PR EMERGENCY DEPT VISIT,LEVEL IV: ICD-10-PCS | Mod: ,,, | Performed by: NURSE PRACTITIONER

## 2023-01-07 PROCEDURE — 99284 EMERGENCY DEPT VISIT MOD MDM: CPT | Mod: ,,, | Performed by: NURSE PRACTITIONER

## 2023-01-07 PROCEDURE — 81003 URINALYSIS AUTO W/O SCOPE: CPT | Performed by: NURSE PRACTITIONER

## 2023-01-07 PROCEDURE — 96374 THER/PROPH/DIAG INJ IV PUSH: CPT | Mod: 59

## 2023-01-07 PROCEDURE — 99285 EMERGENCY DEPT VISIT HI MDM: CPT | Mod: 25

## 2023-01-07 RX ORDER — ONDANSETRON 2 MG/ML
4 INJECTION INTRAMUSCULAR; INTRAVENOUS
Status: COMPLETED | OUTPATIENT
Start: 2023-01-07 | End: 2023-01-07

## 2023-01-07 RX ORDER — MORPHINE SULFATE 4 MG/ML
4 INJECTION, SOLUTION INTRAMUSCULAR; INTRAVENOUS
Status: COMPLETED | OUTPATIENT
Start: 2023-01-07 | End: 2023-01-07

## 2023-01-07 RX ADMIN — IOPAMIDOL 100 ML: 755 INJECTION, SOLUTION INTRAVENOUS at 04:01

## 2023-01-07 RX ADMIN — MORPHINE SULFATE 4 MG: 4 INJECTION, SOLUTION INTRAMUSCULAR; INTRAVENOUS at 04:01

## 2023-01-07 RX ADMIN — ONDANSETRON HYDROCHLORIDE 4 MG: 2 SOLUTION INTRAMUSCULAR; INTRAVENOUS at 04:01

## 2023-01-07 NOTE — PROVIDER PROGRESS NOTES - EMERGENCY DEPT.
Encounter Date: 1/7/2023    ED Physician Progress Notes        Physician Note:   Patient resting comfortably at this time

## 2023-01-07 NOTE — ED PROVIDER NOTES
Encounter Date: 1/7/2023       History     Chief Complaint   Patient presents with    Flank Pain     72 year old male presents to the emergency department to be evaluated for right flank pain began last night. Denies any recent fall or injury.  He has had some mild nausea, denies any vomiting, constipation, diarrhea, abdominal pain, chest pain, shortness of breath.    The history is provided by the patient.   Abdominal Pain  The current episode started yesterday. The other symptoms of the illness include nausea. The other symptoms of the illness do not include fever, fatigue, jaundice, melena, vomiting, diarrhea, dysuria, hematemesis or hematochezia.   Nausea began today.   Symptoms associated with the illness do not include chills, anorexia, diaphoresis, heartburn, constipation, urgency, hematuria, frequency or back pain.   Review of patient's allergies indicates:  No Known Allergies  Past Medical History:   Diagnosis Date    Anxiety disorder, unspecified     Chronic bilateral low back pain with left-sided sciatica 04/12/2022    Chronic rhinitis 04/12/2022    Depressive disorder     Essential (primary) hypertension     GERD (gastroesophageal reflux disease)     History of colon polyps 04/12/2022    Hyperlipidemia     RAYMUNDO on CPAP     RLS (restless legs syndrome)     TIA (transient ischemic attack)      Past Surgical History:   Procedure Laterality Date    BACK SURGERY      Caudal HARPER  02/18/2013    Dr Rdz    FOOT SURGERY Left     Left l4-L5 TFESI Left 06/20/2012    Dr Rdz    SHOULDER ARTHROSCOPY      T3-T4 HARPER  02/27/2012    Dr Rdz    TONSILLECTOMY      UVULECTOMY       Family History   Problem Relation Age of Onset    Heart disease Mother     Rheumatic fever Mother     Heart disease Father     Heart attack Father     No Known Problems Sister     Arthritis Sister     No Known Problems Brother     No Known Problems Brother     Hyperlipidemia Son     Hypertension Son     No Known Problems Maternal  Grandmother     No Known Problems Maternal Grandfather     No Known Problems Paternal Grandmother     No Known Problems Paternal Grandfather      Social History     Tobacco Use    Smoking status: Former     Packs/day: 1.00     Years: 10.00     Pack years: 10.00     Types: Cigarettes     Quit date:      Years since quittin.0    Smokeless tobacco: Never   Substance Use Topics    Alcohol use: Yes     Comment: Occasionally    Drug use: Never     Review of Systems   Constitutional:  Negative for chills, diaphoresis, fatigue and fever.   Gastrointestinal:  Positive for abdominal pain and nausea. Negative for anorexia, constipation, diarrhea, heartburn, hematemesis, hematochezia, jaundice, melena and vomiting.   Genitourinary:  Negative for dysuria, frequency, hematuria and urgency.   Musculoskeletal:  Negative for back pain.   All other systems reviewed and are negative.    Physical Exam     Initial Vitals [23 1508]   BP Pulse Resp Temp SpO2   (!) 181/89 82 16 98 °F (36.7 °C) 96 %      MAP       --         Physical Exam    Vitals reviewed.  Constitutional: He appears well-developed and well-nourished.   Neck: Neck supple.   Cardiovascular:  Normal rate and regular rhythm.           Pulmonary/Chest: Breath sounds normal.   Abdominal: Abdomen is soft. Bowel sounds are normal. He exhibits no distension and no mass. There is no abdominal tenderness. There is no rebound and no guarding.   Musculoskeletal:         General: Normal range of motion.      Cervical back: Neck supple.     Neurological: He is alert and oriented to person, place, and time. He has normal strength. GCS score is 15. GCS eye subscore is 4. GCS verbal subscore is 5. GCS motor subscore is 6.   Skin: Skin is warm and dry. Capillary refill takes less than 2 seconds.   Psychiatric: He has a normal mood and affect.       Medical Screening Exam   See Full Note    ED Course   Procedures  Labs Reviewed   COMPREHENSIVE METABOLIC PANEL - Abnormal;  Notable for the following components:       Result Value    Glucose 167 (*)     All other components within normal limits   URINALYSIS - Abnormal; Notable for the following components:    Protein, UA 10 (*)     Specific Gravity, UA 1.036 (*)     All other components within normal limits   CBC WITH DIFFERENTIAL - Abnormal; Notable for the following components:    WBC 11.02 (*)     Neutrophils % 66.3 (*)     Lymphocytes % 22.9 (*)     Monocytes % 7.4 (*)     Monocytes, Absolute 0.81 (*)     All other components within normal limits   LIPASE - Normal   CBC W/ AUTO DIFFERENTIAL    Narrative:     The following orders were created for panel order CBC auto differential.  Procedure                               Abnormality         Status                     ---------                               -----------         ------                     CBC with Differential[[id number]]        Abnormal            Final result                 Please view results for these tests on the individual orders.          Imaging Results              CT Abdomen Pelvis W Wo Contrast (Final result)  Result time 01/07/23 16:49:29      Final result by Bean Brito MD (01/07/23 16:49:29)                   Impression:      No radiopaque renal or ureteral stone    Diverticulosis of the colon without darlene diverticulitis    Fat containing inguinal hernias bilaterally      Electronically signed by: Bean Brito  Date:    01/07/2023  Time:    16:49               Narrative:    EXAMINATION:  CT ABDOMEN AND PELVIS with and without IV contrast    CLINICAL HISTORY:  Right flank pain.  Nausea    TECHNIQUE:  Axial CT images were obtained through the abdomen and pelvis before and after IV administration of 100 mL of Isovue 370 contrast. Oral contrast was not given.  Coronal and sagittal reconstructions submitted and interpreted.  Total DLP 4020 mGycm.  Automated exposure control utilized.    COMPARISON:  05/25/2019 abdomen pelvis CT    FINDINGS:  CT  abdomen:    There is no darlene pneumonia in the partially visualized lung bases.  There is mild coronary artery calcification.  There is no pleural or pericardial effusion.    There is no evidence of pneumoperitoneum.    There is mild hepatic steatosis.  Liver, bile ducts, spleen, pancreas, adrenal glands, and gallbladder are unremarkable.  There is no radiopaque renal or ureteral stone.  There is bilateral renal excretion of contrast without hydronephrosis.  There is no renal mass.    There is no aneurysm of the moderately calcified abdominal aorta.  There is a retroaortic left renal vein.    Stomach is not well distended.  There is no darlene bowel obstruction.  Appendix appears normal.  There is diverticulosis of the colon without darlene diverticulitis.    CT pelvis:    Urinary bladder is mildly distended.  There is no soft tissue mass in the pelvis.  There are fat containing inguinal hernias bilaterally.    No acute osseous findings.  With there is prominent degenerative disc narrowing of the spine.                                       XR ABDOMEN, ACUTE 2 OR MORE VIEWS WITH CHEST (Final result)  Result time 01/07/23 15:18:59      Final result by Bean Brito MD (01/07/23 15:18:59)                   Impression:      No acute process    Moderate retained stool in the colon      Electronically signed by: Bean Brito  Date:    01/07/2023  Time:    15:18               Narrative:    EXAMINATION:  XR ABDOMEN ACUTE 2 OR MORE VIEWS WITH CHEST    CLINICAL HISTORY:  right flank pain;.    COMPARISON:  February 24, 2022 abdominal x-ray    TECHNIQUE:  PA chest with AP supine and erect views abdomen    FINDINGS:  Cardiac silhouette is not enlarged.  There is no mediastinal mass.  There is no pulmonary vascular engorgement.    Lungs and pleural spaces are clear.    There is no evidence of pneumoperitoneum.  Bowel gas pattern is nonobstructive.  There is moderate retained stool in the colon.  There is no definite  radiopaque calculus.    There is moderate levoscoliosis centered at the L2 level.  There is moderate degenerative disc disease of the spine.                                       Medications   morphine injection 4 mg (4 mg Intravenous Given 1/7/23 1642)   ondansetron injection 4 mg (4 mg Intravenous Given 1/7/23 1642)   iopamidoL (ISOVUE-370) injection 100 mL (100 mLs Intravenous Given 1/7/23 1635)                 ED Course as of 01/25/23 1438   Sat Jan 07, 2023   1527 Protein, UA(!): 10 [JM]   1527 Specific Gravity, UA(!): 1.036 []      ED Course User Index  [JM] ARGENTINA Muñoz          Clinical Impression:   Final diagnoses:  [R10.84] Generalized abdominal pain (Primary)  [K59.03] Drug-induced constipation        ED Disposition Condition    Discharge Stable          ED Prescriptions    None       Follow-up Information    None          ARGENTINA Muñoz  01/08/23 0806       ARGENTINA Muñoz  01/25/23 1439

## 2023-01-25 ENCOUNTER — TELEPHONE (OUTPATIENT)
Dept: FAMILY MEDICINE | Facility: CLINIC | Age: 73
End: 2023-01-25
Payer: MEDICARE

## 2023-01-25 DIAGNOSIS — R52 PAIN: ICD-10-CM

## 2023-01-25 RX ORDER — DICLOFENAC SODIUM 10 MG/G
GEL TOPICAL
Qty: 3000 G | Refills: 5 | Status: SHIPPED | OUTPATIENT
Start: 2023-01-25 | End: 2023-04-19 | Stop reason: SDUPTHER

## 2023-01-25 NOTE — PROVIDER PROGRESS NOTES - EMERGENCY DEPT.
Encounter Date: 1/7/2023    ED Physician Progress Notes        MDM  72 year old male presents to the emergency department to be evaluated for right flank pain began last night. Denies any recent fall or injury.  He has had some mild nausea, denies any vomiting, constipation, diarrhea, abdominal pain, chest pain, shortness of breath.  I ordered labs and personally reviewed them.    I ordered X-rays and personally reviewed them and reviewed the radiologist interpretation.  Xray significant for moderate retained stool in the colon.    I ordered CT scan and personally reviewed it and reviewed the radiologist interpretation.  CT significant for no radiopaque renal or ureteral stone. Diverticulosis of the colon without darlene diverticulitis. Fat containing inguinal hernias bilaterally.    Diagnosis:  Generalized abdominal pain, drug-induced constipation.  Care of this patient was transferred to Bean ELAINE.  He discharged the patient and instructed him to return if symptoms worsen.  Patient was discharged in stable condition.  Detailed return precautions discussed.     Pt  requested  CIPAP before going to bed , MD notified ,RT notified

## 2023-01-25 NOTE — TELEPHONE ENCOUNTER
----- Message from Liz Phelps sent at 1/25/2023  1:18 PM CST -----  Pt need refill on VOLTAREN sent to [facility] Pt #[phone]

## 2023-04-19 ENCOUNTER — OFFICE VISIT (OUTPATIENT)
Dept: FAMILY MEDICINE | Facility: CLINIC | Age: 73
End: 2023-04-19
Payer: MEDICARE

## 2023-04-19 VITALS
HEART RATE: 69 BPM | BODY MASS INDEX: 34.93 KG/M2 | SYSTOLIC BLOOD PRESSURE: 146 MMHG | RESPIRATION RATE: 18 BRPM | WEIGHT: 244 LBS | HEIGHT: 70 IN | OXYGEN SATURATION: 95 % | TEMPERATURE: 98 F | DIASTOLIC BLOOD PRESSURE: 87 MMHG

## 2023-04-19 DIAGNOSIS — R10.9 RIGHT FLANK PAIN: ICD-10-CM

## 2023-04-19 DIAGNOSIS — I10 ESSENTIAL (PRIMARY) HYPERTENSION: Primary | Chronic | ICD-10-CM

## 2023-04-19 DIAGNOSIS — F41.1 GENERALIZED ANXIETY DISORDER: Chronic | ICD-10-CM

## 2023-04-19 DIAGNOSIS — G89.29 CHRONIC BILATERAL LOW BACK PAIN WITH LEFT-SIDED SCIATICA: Chronic | ICD-10-CM

## 2023-04-19 DIAGNOSIS — R19.01 ABDOMINAL MASS, RUQ (RIGHT UPPER QUADRANT): ICD-10-CM

## 2023-04-19 DIAGNOSIS — G25.81 RLS (RESTLESS LEGS SYNDROME): Chronic | ICD-10-CM

## 2023-04-19 DIAGNOSIS — M54.42 CHRONIC BILATERAL LOW BACK PAIN WITH LEFT-SIDED SCIATICA: Chronic | ICD-10-CM

## 2023-04-19 DIAGNOSIS — F32.A DEPRESSIVE DISORDER: Chronic | ICD-10-CM

## 2023-04-19 DIAGNOSIS — Z79.899 ENCOUNTER FOR LONG-TERM (CURRENT) USE OF OTHER MEDICATIONS: ICD-10-CM

## 2023-04-19 DIAGNOSIS — J31.0 CHRONIC RHINITIS: Chronic | ICD-10-CM

## 2023-04-19 DIAGNOSIS — E66.01 SEVERE OBESITY (BMI 35.0-35.9 WITH COMORBIDITY): ICD-10-CM

## 2023-04-19 DIAGNOSIS — E78.00 PURE HYPERCHOLESTEROLEMIA: Chronic | ICD-10-CM

## 2023-04-19 DIAGNOSIS — Z13.1 DIABETES MELLITUS SCREENING: ICD-10-CM

## 2023-04-19 DIAGNOSIS — R52 PAIN: ICD-10-CM

## 2023-04-19 LAB
ALBUMIN SERPL BCP-MCNC: 3.5 G/DL (ref 3.5–5)
ALBUMIN/GLOB SERPL: 1.2 {RATIO}
ALP SERPL-CCNC: 85 U/L (ref 45–115)
ALT SERPL W P-5'-P-CCNC: 21 U/L (ref 16–61)
ANION GAP SERPL CALCULATED.3IONS-SCNC: 7 MMOL/L (ref 7–16)
AST SERPL W P-5'-P-CCNC: 9 U/L (ref 15–37)
BILIRUB SERPL-MCNC: 0.5 MG/DL (ref ?–1.2)
BUN SERPL-MCNC: 17 MG/DL (ref 7–18)
BUN/CREAT SERPL: 19 (ref 6–20)
CALCIUM SERPL-MCNC: 9 MG/DL (ref 8.5–10.1)
CHLORIDE SERPL-SCNC: 109 MMOL/L (ref 98–107)
CHOLEST SERPL-MCNC: 187 MG/DL (ref 0–200)
CHOLEST/HDLC SERPL: 2.6 {RATIO}
CO2 SERPL-SCNC: 30 MMOL/L (ref 21–32)
CREAT SERPL-MCNC: 0.88 MG/DL (ref 0.7–1.3)
EGFR (NO RACE VARIABLE) (RUSH/TITUS): 91 ML/MIN/1.73M²
EST. AVERAGE GLUCOSE BLD GHB EST-MCNC: 124 MG/DL
GLOBULIN SER-MCNC: 2.9 G/DL (ref 2–4)
GLUCOSE SERPL-MCNC: 109 MG/DL (ref 74–106)
HBA1C MFR BLD HPLC: 6.3 % (ref 4.5–6.6)
HDLC SERPL-MCNC: 72 MG/DL (ref 40–60)
LDLC SERPL CALC-MCNC: 108 MG/DL
LDLC/HDLC SERPL: 1.5 {RATIO}
NONHDLC SERPL-MCNC: 115 MG/DL
POTASSIUM SERPL-SCNC: 4.4 MMOL/L (ref 3.5–5.1)
PROT SERPL-MCNC: 6.4 G/DL (ref 6.4–8.2)
SODIUM SERPL-SCNC: 142 MMOL/L (ref 136–145)
TRIGL SERPL-MCNC: 37 MG/DL (ref 35–150)
VLDLC SERPL-MCNC: 7 MG/DL

## 2023-04-19 PROCEDURE — 99214 PR OFFICE/OUTPT VISIT, EST, LEVL IV, 30-39 MIN: ICD-10-PCS | Mod: ,,, | Performed by: NURSE PRACTITIONER

## 2023-04-19 PROCEDURE — 83036 HEMOGLOBIN A1C: ICD-10-PCS | Mod: ,,, | Performed by: CLINICAL MEDICAL LABORATORY

## 2023-04-19 PROCEDURE — 80053 COMPREHENSIVE METABOLIC PANEL: ICD-10-PCS | Mod: ,,, | Performed by: CLINICAL MEDICAL LABORATORY

## 2023-04-19 PROCEDURE — 99214 OFFICE O/P EST MOD 30 MIN: CPT | Mod: ,,, | Performed by: NURSE PRACTITIONER

## 2023-04-19 PROCEDURE — 80061 LIPID PANEL: CPT | Mod: ,,, | Performed by: CLINICAL MEDICAL LABORATORY

## 2023-04-19 PROCEDURE — 80061 LIPID PANEL: ICD-10-PCS | Mod: ,,, | Performed by: CLINICAL MEDICAL LABORATORY

## 2023-04-19 PROCEDURE — 80053 COMPREHEN METABOLIC PANEL: CPT | Mod: ,,, | Performed by: CLINICAL MEDICAL LABORATORY

## 2023-04-19 PROCEDURE — 83036 HEMOGLOBIN GLYCOSYLATED A1C: CPT | Mod: ,,, | Performed by: CLINICAL MEDICAL LABORATORY

## 2023-04-19 RX ORDER — NALOXONE HYDROCHLORIDE 4 MG/.1ML
1 SPRAY NASAL DAILY PRN
COMMUNITY
Start: 2023-01-13

## 2023-04-19 RX ORDER — DICLOFENAC SODIUM 10 MG/G
GEL TOPICAL
Qty: 3000 G | Refills: 5 | Status: SHIPPED | OUTPATIENT
Start: 2023-04-19 | End: 2024-01-16 | Stop reason: SDUPTHER

## 2023-04-19 NOTE — PROGRESS NOTES
[facility] - FAMILY MEDICINE       PATIENT NAME: Chano Clement   : 1950    AGE: 72 y.o. DATE OF ENCOUNTER: 23    MRN: [id number]      PCP: ARGENTINA West    Reason for Visit / Chief Complaint:  Follow-up (Patient presents to clinic for 6m f/u HTN HLD Pt is fasting.)         274}    Subjective:     HPI:    Presents for 6 mth f/u HTN and HLD.  Has not taken BP medication this a.m..  Anemia resolved on CBC in ER 2023.    C/o R flank pain x 3-4 mths, noted tender mass R flank/upper quad approx 2 mths ago    Review of Systems:   Review of Systems   Constitutional: Negative.    HENT: Negative.     Respiratory: Negative.     Cardiovascular: Negative.    Gastrointestinal: Negative.  Abdominal pain: pain R flank/RUQ with tender mass palpated.        Hx diverticulosis.  Occasional constipation, takes stool softener.   Genitourinary: Negative.  Difficulty urinating: a little slow at first, but okay.   Musculoskeletal:  Positive for arthralgias and back pain.        Saw a provider at [facility] for his right knee and was told to let them know when ready for surgery.   Skin: Negative.    Neurological: Negative.    Psychiatric/Behavioral:  Dysphoric mood: controlled with med.      Allergies and Meds: 274}   Review of patient's allergies indicates:  No Known Allergies     Current Outpatient Medications:     cyclobenzaprine (FLEXERIL) 10 MG tablet, Take 10 mg by mouth daily as needed., Disp: , Rfl:     docusate sodium (COLACE) 100 MG capsule, Take 1 capsule (100 mg total) by mouth 2 (two) times daily., Disp: 60 capsule, Rfl: 1    DULoxetine (CYMBALTA) 60 MG capsule, Take 1 capsule (60 mg total) by mouth 2 (two) times daily., Disp: 180 capsule, Rfl: 3    EScitalopram oxalate (LEXAPRO) 20 MG tablet, Take 1 tablet (20 mg total) by mouth once daily., Disp: 90 tablet, Rfl: 3    ezetimibe (ZETIA) 10 mg tablet, Take 1 tablet (10 mg total) by mouth once daily., Disp: 90 tablet,  Rfl: 3    gabapentin (NEURONTIN) 600 MG tablet, Take 2 tablets (1,200 mg total) by mouth 2 (two) times daily., Disp: 360 tablet, Rfl: 1    HYDROcodone-acetaminophen (NORCO)  mg per tablet, Take 1 tablet by mouth 3 (three) times daily as needed for Pain., Disp: 90 tablet, Rfl: 0    lisinopriL (PRINIVIL,ZESTRIL) 40 MG tablet, Take 1 tablet (40 mg total) by mouth once daily., Disp: 90 tablet, Rfl: 3    loratadine (CLARITIN) 10 mg tablet, Take 1 tablet (10 mg total) by mouth once daily., Disp: 90 tablet, Rfl: 3    NARCAN 4 mg/actuation Spry, 1 spray by Nasal route daily as needed., Disp: , Rfl:     pramipexole (MIRAPEX) 0.25 MG tablet, Take 1 tablet (0.25 mg total) by mouth 2 (two) times daily. (Patient taking differently: Take 0.25 mg by mouth once daily.), Disp: 180 tablet, Rfl: 1    tamsulosin (FLOMAX) 0.4 mg Cap, Take 1 capsule (0.4 mg total) by mouth once daily., Disp: 90 capsule, Rfl: 3    traZODone (DESYREL) 50 MG tablet, Take 4 tablets (200 mg total) by mouth every evening., Disp: 360 tablet, Rfl: 3    albuterol (VENTOLIN HFA) 90 mcg/actuation inhaler, Inhale 2 puffs into the lungs every 4 (four) hours as needed for Wheezing or Shortness of Breath. Rescue, Disp: 18 g, Rfl: 1    diclofenac sodium (VOLTAREN) 1 % Gel, Apply to back four times daily as needed for pain, Disp: 3000 g, Rfl: 5    Labs:274}    I have reviewed old labs below:  Lab Results   Component Value Date    WBC 11.02 (H) 01/07/2023    RBC 4.85 01/07/2023    HGB 14.3 01/07/2023    HCT 44.2 01/07/2023     01/07/2023     01/07/2023    K 3.9 01/07/2023     01/07/2023    CALCIUM 9.2 01/07/2023     (H) 01/07/2023    BUN 18 01/07/2023    CREATININE 0.98 01/07/2023    EGFRNONAA 80 04/12/2022    ALT 22 01/07/2023    AST 17 01/07/2023    CHOL 166 04/12/2022    TRIG 63 04/12/2022    HDL 58 04/12/2022    LDLCALC 95 04/12/2022    TSH 1.250 04/12/2022    PSA 1.140 10/12/2022       Medical History: 274}     Past Medical History:  "  Diagnosis Date    Anxiety disorder, unspecified     Chronic bilateral low back pain with left-sided sciatica 2022    Chronic rhinitis 2022    Depressive disorder     Essential (primary) hypertension     GERD (gastroesophageal reflux disease)     History of colon polyps 2022    Hyperlipidemia     RAYMUNDO on CPAP     RLS (restless legs syndrome)     TIA (transient ischemic attack)       Social History     Tobacco Use   Smoking Status Former    Packs/day: 1.00    Years: 10.00    Pack years: 10.00    Types: Cigarettes    Quit date:     Years since quittin.3   Smokeless Tobacco Never      Past Surgical History:   Procedure Laterality Date    BACK SURGERY      Caudal HARPER  2013    Dr Rdz    FOOT SURGERY Left     Left l4-L5 TFESI Left 2012    Dr Rdz    SHOULDER ARTHROSCOPY      T3-T4 HARPER  2012    Dr Rdz    TONSILLECTOMY      UVULECTOMY        Objective:  274}   BP (!) 146/87 (BP Location: Left arm, Patient Position: Sitting, BP Method: Large (Manual))   Pulse 69   Temp 98.1 °F (36.7 °C) (Oral)   Resp 18   Ht 5' 10" (1.778 m)   Wt 110.7 kg (244 lb)   SpO2 95%   BMI 35.01 kg/m²     Wt Readings from Last 3 Encounters:   23 110.7 kg (244 lb)   23 113.4 kg (250 lb)   22 110.7 kg (244 lb)     BP Readings from Last 3 Encounters:   23 (!) 146/87   23 (!) 181/89   22 138/80     Body mass index is 35.01 kg/m².     Physical Exam  Vitals and nursing note reviewed.   Constitutional:       General: He is not in acute distress.     Appearance: Normal appearance. He is obese.   HENT:      Head: Normocephalic.      Right Ear: Tympanic membrane, ear canal and external ear normal.      Left Ear: Tympanic membrane, ear canal and external ear normal.      Nose: Nose normal.      Mouth/Throat:      Mouth: Mucous membranes are moist.      Pharynx: Oropharynx is clear.   Eyes:      Conjunctiva/sclera: Conjunctivae normal.      Pupils: Pupils are " equal, round, and reactive to light.   Neck:      Thyroid: No thyromegaly.      Vascular: Normal carotid pulses. No carotid bruit.   Cardiovascular:      Rate and Rhythm: Normal rate and regular rhythm.      Pulses: Normal pulses.      Heart sounds: Normal heart sounds.   Pulmonary:      Effort: Pulmonary effort is normal.      Breath sounds: Normal breath sounds.   Abdominal:      Palpations: Abdomen is soft. There is mass.      Tenderness: There is abdominal tenderness.       Musculoskeletal:      Cervical back: Neck supple.      Right lower leg: No edema.      Left lower leg: No edema.   Lymphadenopathy:      Cervical: No cervical adenopathy.   Skin:     General: Skin is warm and dry.   Neurological:      General: No focal deficit present.      Mental Status: He is alert and oriented to person, place, and time.   Psychiatric:         Mood and Affect: Mood normal.         Behavior: Behavior normal.        Assessment and Plan: 274}     1. Essential (primary) hypertension  Comments:  Controlled continue lisinopril.    2. Pure hypercholesterolemia  Comments:  Controlled, continue Zetia.  Orders:  -     Comprehensive Metabolic Panel; Future; Expected date: 04/19/2023  -     Lipid Panel; Future; Expected date: 04/19/2023    3. RLS (restless legs syndrome)  Comments:  Controlled, continue Mirapex.    4. Depressive disorder  Comments:  Controlled continue current meds and treatment.    5. Generalized anxiety disorder  Comments:  Controlled continue current meds and treatment.    6. Chronic rhinitis    7. Encounter for long-term (current) use of other medications  -     Comprehensive Metabolic Panel; Future; Expected date: 04/19/2023  -     Hemoglobin A1C; Future; Expected date: 04/19/2023    8. Diabetes mellitus screening  -     Hemoglobin A1C; Future; Expected date: 04/19/2023    9. Pain  -     diclofenac sodium (VOLTAREN) 1 % Gel; Apply to back four times daily as needed for pain  Dispense: 3000 g; Refill: 5    10.  Abdominal mass, RUQ (right upper quadrant)  Comments:  Discussed no mass noted on CT 3 months ago but he found the mass approximately 2 months ago so will repeat CT and likely refer to Gen Surgery.  Orders:  -     CT Abdomen With Contrast; Future; Expected date: 04/26/2023    11. Right flank pain  -     CT Abdomen With Contrast; Future; Expected date: 04/26/2023    12. Severe obesity (BMI 35.0-35.9 with comorbidity)  -     Hemoglobin A1C; Future; Expected date: 04/19/2023    13. Chronic bilateral low back pain with left-sided sciatica  Comments:  Increasing over time, history lumbar DDD, previous surgery, saw [facility] provider for his back, has been followed by OTF Wang NP at [facility] but she's leaving       Return to clinic 6 weeks for uncontrolled HTN, advised to take meds prior to appointment; and sooner as needed.    Future Appointments   Date Time Provider Department Center   1/4/2024  1:00 PM AWOSCAR NURSE, [facility] FAMILY MEDICINE [facility] [place]        Signature:  ARGENTINA West           Reviewed by Caden Loyd MD on 07/25/2023.

## 2023-04-23 PROBLEM — R73.03 PREDIABETES: Status: ACTIVE | Noted: 2023-04-19

## 2023-04-24 DIAGNOSIS — R73.03 PREDIABETES: Primary | ICD-10-CM

## 2023-04-24 RX ORDER — METFORMIN HYDROCHLORIDE 500 MG/1
500 TABLET, EXTENDED RELEASE ORAL
Qty: 90 TABLET | Refills: 3 | Status: SHIPPED | OUTPATIENT
Start: 2023-04-24 | End: 2023-08-01 | Stop reason: SDUPTHER

## 2023-04-24 NOTE — PROGRESS NOTES
His A1c is VERY close to dx of T2DM (6.5%).  I recommend starting metformin  mg with supper to help prevent progression of prediabetes.

## 2023-04-25 DIAGNOSIS — F41.1 GENERALIZED ANXIETY DISORDER: ICD-10-CM

## 2023-04-25 RX ORDER — ESCITALOPRAM OXALATE 20 MG/1
20 TABLET ORAL DAILY
Qty: 90 TABLET | Refills: 3 | Status: SHIPPED | OUTPATIENT
Start: 2023-04-25 | End: 2023-11-14

## 2023-05-09 ENCOUNTER — HOSPITAL ENCOUNTER (OUTPATIENT)
Dept: RADIOLOGY | Facility: HOSPITAL | Age: 73
Discharge: HOME | End: 2023-05-09
Attending: NURSE PRACTITIONER
Payer: MEDICARE

## 2023-05-09 DIAGNOSIS — R10.9 RIGHT FLANK PAIN: ICD-10-CM

## 2023-05-09 DIAGNOSIS — R19.01 ABDOMINAL MASS, RUQ (RIGHT UPPER QUADRANT): ICD-10-CM

## 2023-05-09 DIAGNOSIS — Z71.89 COMPLEX CARE COORDINATION: ICD-10-CM

## 2023-05-09 PROCEDURE — 74160 CT ABDOMEN W/CONTRAST: CPT | Mod: 26,,, | Performed by: RADIOLOGY

## 2023-05-09 PROCEDURE — 25500020 PHARM REV CODE 255: Performed by: NURSE PRACTITIONER

## 2023-05-09 PROCEDURE — 74160 CT ABDOMEN WITH CONTRAST: ICD-10-PCS | Mod: 26,,, | Performed by: RADIOLOGY

## 2023-05-09 PROCEDURE — 74160 CT ABDOMEN W/CONTRAST: CPT | Mod: TC

## 2023-05-09 RX ADMIN — IOPAMIDOL 100 ML: 755 INJECTION, SOLUTION INTRAVENOUS at 09:05

## 2023-05-09 NOTE — PROGRESS NOTES
No abnormality noted on CT to the area of his painful mass right upper quadrant.  Could be a lipoma/fatty tumor.  I can refer him to General surgery to discuss removal if desired.  Who does he want to see?

## 2023-05-10 ENCOUNTER — TELEPHONE (OUTPATIENT)
Dept: FAMILY MEDICINE | Facility: CLINIC | Age: 73
End: 2023-05-10
Payer: MEDICARE

## 2023-05-10 DIAGNOSIS — R19.01 ABDOMINAL MASS, RUQ (RIGHT UPPER QUADRANT): Primary | ICD-10-CM

## 2023-05-10 NOTE — TELEPHONE ENCOUNTER
----- Message from ARGENTINA West sent at 5/9/2023  6:58 PM CDT -----  No abnormality noted on CT to the area of his painful mass right upper quadrant.  Could be a lipoma/fatty tumor.  I can refer him to General surgery to discuss removal if desired.  Who does he want to see?

## 2023-05-10 NOTE — TELEPHONE ENCOUNTER
There are no female surgeons at [facility] or [facility].  I have entered a referral to Dr. Yassine Kc at [facility].  I have personally used him.  He is a very good surgeon.

## 2023-05-10 NOTE — TELEPHONE ENCOUNTER
Patient called in regards of CT scan and notified of results and voiced understanding, unsure of which doctor would be best, would like recommendation for referral. States that he would prefer a woman doctor. Thanks.

## 2023-05-10 NOTE — TELEPHONE ENCOUNTER
----- Message from Liz Phelps sent at 5/10/2023 12:46 PM CDT -----  Pt called to check to the CT  he had done Pt # [phone]

## 2023-05-11 NOTE — TELEPHONE ENCOUNTER
Patient notified of referral to Dr Kc.  Instructed if he has not received a call with in a week with appointment date please call our office.

## 2023-05-22 ENCOUNTER — OFFICE VISIT (OUTPATIENT)
Dept: FAMILY MEDICINE | Facility: CLINIC | Age: 73
End: 2023-05-22
Payer: MEDICARE

## 2023-05-22 VITALS
TEMPERATURE: 98 F | OXYGEN SATURATION: 94 % | WEIGHT: 238 LBS | HEART RATE: 80 BPM | SYSTOLIC BLOOD PRESSURE: 122 MMHG | DIASTOLIC BLOOD PRESSURE: 66 MMHG | RESPIRATION RATE: 20 BRPM | BODY MASS INDEX: 34.07 KG/M2 | HEIGHT: 70 IN

## 2023-05-22 DIAGNOSIS — I10 ESSENTIAL (PRIMARY) HYPERTENSION: Primary | Chronic | ICD-10-CM

## 2023-05-22 DIAGNOSIS — I70.0 AORTIC ATHEROSCLEROSIS: ICD-10-CM

## 2023-05-22 PROBLEM — F33.1 MODERATE EPISODE OF RECURRENT MAJOR DEPRESSIVE DISORDER: Status: RESOLVED | Noted: 2023-05-22 | Resolved: 2023-05-22

## 2023-05-22 PROBLEM — R73.03 PREDIABETES: Chronic | Status: ACTIVE | Noted: 2023-04-19

## 2023-05-22 PROBLEM — F19.939: Status: RESOLVED | Noted: 2023-05-22 | Resolved: 2023-05-22

## 2023-05-22 PROBLEM — F19.939: Status: ACTIVE | Noted: 2023-05-22

## 2023-05-22 PROBLEM — F33.1 MODERATE EPISODE OF RECURRENT MAJOR DEPRESSIVE DISORDER: Status: ACTIVE | Noted: 2023-05-22

## 2023-05-22 PROCEDURE — 99213 OFFICE O/P EST LOW 20 MIN: CPT | Mod: ,,, | Performed by: NURSE PRACTITIONER

## 2023-05-22 PROCEDURE — 99213 PR OFFICE/OUTPT VISIT, EST, LEVL III, 20-29 MIN: ICD-10-PCS | Mod: ,,, | Performed by: NURSE PRACTITIONER

## 2023-05-22 NOTE — PROGRESS NOTES
[facility] - FAMILY MEDICINE       PATIENT NAME: Chano Clement   : 1950    AGE: 72 y.o. DATE OF ENCOUNTER: 23    MRN: [id number]      PCP: ARGENTINA West    Reason for Visit / Chief Complaint:  Diabetes (Patient presents to the clinic a 6wk for htn)         274}    Subjective:     HPI:    Presents for 6 wk f/u uncontrolled HTN.    Has appointment tomorrow with Dr. Kc for evaluation of right upper quadrant mass.    Review of Systems:   Review of Systems   Constitutional: Negative.    Respiratory: Negative.     Cardiovascular: Negative.    Neurological: Negative.      Allergies and Meds: 274}   Review of patient's allergies indicates:  No Known Allergies     Current Outpatient Medications:     albuterol (VENTOLIN HFA) 90 mcg/actuation inhaler, Inhale 2 puffs into the lungs every 4 (four) hours as needed for Wheezing or Shortness of Breath. Rescue, Disp: 18 g, Rfl: 1    cyclobenzaprine (FLEXERIL) 10 MG tablet, Take 10 mg by mouth daily as needed., Disp: , Rfl:     diclofenac sodium (VOLTAREN) 1 % Gel, Apply to back four times daily as needed for pain, Disp: 3000 g, Rfl: 5    docusate sodium (COLACE) 100 MG capsule, Take 1 capsule (100 mg total) by mouth 2 (two) times daily., Disp: 60 capsule, Rfl: 1    DULoxetine (CYMBALTA) 60 MG capsule, Take 1 capsule (60 mg total) by mouth 2 (two) times daily., Disp: 180 capsule, Rfl: 3    EScitalopram oxalate (LEXAPRO) 20 MG tablet, Take 1 tablet (20 mg total) by mouth once daily., Disp: 90 tablet, Rfl: 3    ezetimibe (ZETIA) 10 mg tablet, Take 1 tablet (10 mg total) by mouth once daily., Disp: 90 tablet, Rfl: 3    gabapentin (NEURONTIN) 600 MG tablet, Take 2 tablets (1,200 mg total) by mouth 2 (two) times daily., Disp: 360 tablet, Rfl: 1    HYDROcodone-acetaminophen (NORCO)  mg per tablet, Take 1 tablet by mouth 3 (three) times daily as needed for Pain., Disp: 90 tablet, Rfl: 0    lisinopriL (PRINIVIL,ZESTRIL) 40 MG tablet, Take  1 tablet (40 mg total) by mouth once daily., Disp: 90 tablet, Rfl: 3    loratadine (CLARITIN) 10 mg tablet, Take 1 tablet (10 mg total) by mouth once daily., Disp: 90 tablet, Rfl: 3    metFORMIN (GLUCOPHAGE-XR) 500 MG ER 24hr tablet, Take 1 tablet (500 mg total) by mouth daily with dinner or evening meal., Disp: 90 tablet, Rfl: 3    NARCAN 4 mg/actuation Spry, 1 spray by Nasal route daily as needed., Disp: , Rfl:     pramipexole (MIRAPEX) 0.25 MG tablet, Take 1 tablet (0.25 mg total) by mouth 2 (two) times daily. (Patient taking differently: Take 0.25 mg by mouth once daily.), Disp: 180 tablet, Rfl: 1    tamsulosin (FLOMAX) 0.4 mg Cap, Take 1 capsule (0.4 mg total) by mouth once daily., Disp: 90 capsule, Rfl: 3    traZODone (DESYREL) 50 MG tablet, Take 4 tablets (200 mg total) by mouth every evening., Disp: 360 tablet, Rfl: 3    Labs:274}    I have reviewed old labs below:  Lab Results   Component Value Date    WBC 11.02 (H) 01/07/2023    RBC 4.85 01/07/2023    HGB 14.3 01/07/2023    HCT 44.2 01/07/2023     01/07/2023     04/19/2023    K 4.4 04/19/2023     (H) 04/19/2023    CALCIUM 9.0 04/19/2023     (H) 04/19/2023    BUN 17 04/19/2023    CREATININE 0.88 04/19/2023    EGFRNONAA 80 04/12/2022    ALT 21 04/19/2023    AST 9 (L) 04/19/2023    CHOL 187 04/19/2023    TRIG 37 04/19/2023    HDL 72 (H) 04/19/2023    LDLCALC 108 04/19/2023    TSH 1.250 04/12/2022    PSA 1.140 10/12/2022    HGBA1C 6.3 04/19/2023       Medical History: 274}     Past Medical History:   Diagnosis Date    Anxiety disorder, unspecified     Chronic bilateral low back pain with left-sided sciatica 04/12/2022    Chronic rhinitis 04/12/2022    Depressive disorder     Essential (primary) hypertension     GERD (gastroesophageal reflux disease)     History of colon polyps 04/12/2022    Hyperlipidemia     RAYMUNDO on CPAP     Prediabetes 4/19/2023    Lab Results Component Value Date  HGBA1C 6.3 04/19/2023      RLS (restless legs  "syndrome)     TIA (transient ischemic attack)       Social History     Tobacco Use   Smoking Status Former    Packs/day: 1.00    Years: 10.00    Pack years: 10.00    Types: Cigarettes    Quit date: 1975    Years since quittin.4   Smokeless Tobacco Never      Objective:  274}   /66 (BP Location: Right arm, Patient Position: Sitting, BP Method: Large (Automatic))   Pulse 80   Temp 98.4 °F (36.9 °C) (Oral)   Resp 20   Ht 5' 10" (1.778 m)   Wt 108 kg (238 lb)   SpO2 (!) 94%   BMI 34.15 kg/m²     Wt Readings from Last 3 Encounters:   23 108 kg (238 lb)   23 110.7 kg (244 lb)   23 113.4 kg (250 lb)     BP Readings from Last 3 Encounters:   23 122/66   23 (!) 146/87   23 (!) 181/89     Body mass index is 34.15 kg/m².     Physical Exam  Vitals and nursing note reviewed.   Constitutional:       General: He is not in acute distress.     Appearance: Normal appearance. He is not ill-appearing.   HENT:      Head: Normocephalic.   Eyes:      Conjunctiva/sclera: Conjunctivae normal.   Cardiovascular:      Rate and Rhythm: Normal rate and regular rhythm.      Heart sounds: Normal heart sounds.   Pulmonary:      Effort: Pulmonary effort is normal. No respiratory distress.      Breath sounds: Normal breath sounds.   Musculoskeletal:      Cervical back: Neck supple.      Right lower leg: No edema.      Left lower leg: No edema.   Skin:     General: Skin is warm and dry.   Neurological:      Mental Status: He is alert and oriented to person, place, and time.        Assessment and Plan: 274}     1. Essential (primary) hypertension  Comments:  improved, continue current meds & treatment    2. Aortic atherosclerosis  Comments:  noted on CT; on zetia       Return to clinic 5 mths for HTN & predm w/ non-fasting lab; and sooner as needed.    Future Appointments   Date Time Provider Department Center   2023  9:45 AM Yassine Kc MD [facility]   10/25/2023 10:40 AM Calista" ARGENTINA Velásquez [facility] [place]   1/4/2024  1:00 PM AWV NURSE, [facility] FAMILY MEDICINE [facility] [place]        Signature:  ARGENTINA West

## 2023-05-23 ENCOUNTER — OFFICE VISIT (OUTPATIENT)
Dept: SURGERY | Facility: CLINIC | Age: 73
End: 2023-05-23
Attending: SURGERY
Payer: MEDICARE

## 2023-05-23 VITALS — OXYGEN SATURATION: 98 % | TEMPERATURE: 99 F | HEART RATE: 77 BPM

## 2023-05-23 DIAGNOSIS — R19.01 ABDOMINAL MASS, RUQ (RIGHT UPPER QUADRANT): ICD-10-CM

## 2023-05-23 PROCEDURE — 99203 PR OFFICE/OUTPT VISIT, NEW, LEVL III, 30-44 MIN: ICD-10-PCS | Mod: S$PBB,,, | Performed by: SURGERY

## 2023-05-23 PROCEDURE — 99214 OFFICE O/P EST MOD 30 MIN: CPT | Mod: PBBFAC | Performed by: SURGERY

## 2023-05-23 PROCEDURE — 99203 OFFICE O/P NEW LOW 30 MIN: CPT | Mod: S$PBB,,, | Performed by: SURGERY

## 2023-05-23 NOTE — ASSESSMENT & PLAN NOTE
No surgical problem is identifiable.  I discussed this with the patient.  He likely has stretching of his abdominal wall due to obesity of the abdomen.  I discussed weight loss planning with him to include reduced calorie, low carb diet with exercise.  He does state that he has lost 8 lb recently, and that this seems to have helped with the pain.

## 2023-06-05 DIAGNOSIS — G62.9 NEUROPATHY: ICD-10-CM

## 2023-06-05 RX ORDER — GABAPENTIN 600 MG/1
1200 TABLET ORAL 2 TIMES DAILY
Qty: 360 TABLET | Refills: 1 | Status: SHIPPED | OUTPATIENT
Start: 2023-06-05

## 2023-06-05 NOTE — TELEPHONE ENCOUNTER
----- Message from Lila Metz sent at 6/5/2023  8:46 AM CDT -----  Gabapentin to [facility] PT [phone]

## 2023-06-26 DIAGNOSIS — N40.0 BENIGN PROSTATIC HYPERPLASIA, UNSPECIFIED WHETHER LOWER URINARY TRACT SYMPTOMS PRESENT: Primary | ICD-10-CM

## 2023-06-26 RX ORDER — TAMSULOSIN HYDROCHLORIDE 0.4 MG/1
0.4 CAPSULE ORAL DAILY
Qty: 90 CAPSULE | Refills: 3 | Status: SHIPPED | OUTPATIENT
Start: 2023-06-26

## 2023-06-26 NOTE — TELEPHONE ENCOUNTER
----- Message from Liz Phelps sent at 6/26/2023  9:42 AM CDT -----  Pt need refill on FLOMAX sent to [facility]

## 2023-07-06 ENCOUNTER — OFFICE VISIT (OUTPATIENT)
Dept: FAMILY MEDICINE | Facility: CLINIC | Age: 73
End: 2023-07-06
Payer: MEDICARE

## 2023-07-06 VITALS
DIASTOLIC BLOOD PRESSURE: 61 MMHG | OXYGEN SATURATION: 99 % | RESPIRATION RATE: 20 BRPM | HEIGHT: 70 IN | HEART RATE: 66 BPM | TEMPERATURE: 99 F | BODY MASS INDEX: 34.65 KG/M2 | WEIGHT: 242 LBS | SYSTOLIC BLOOD PRESSURE: 113 MMHG

## 2023-07-06 DIAGNOSIS — R42 DIZZINESS: Primary | ICD-10-CM

## 2023-07-06 DIAGNOSIS — S60.511A: ICD-10-CM

## 2023-07-06 DIAGNOSIS — I25.10 ATHEROSCLEROTIC CARDIOVASCULAR DISEASE: Chronic | ICD-10-CM

## 2023-07-06 DIAGNOSIS — E78.00 PURE HYPERCHOLESTEROLEMIA: Chronic | ICD-10-CM

## 2023-07-06 DIAGNOSIS — L08.9: ICD-10-CM

## 2023-07-06 DIAGNOSIS — Z87.891 FORMER SMOKER: ICD-10-CM

## 2023-07-06 PROCEDURE — 99214 PR OFFICE/OUTPT VISIT, EST, LEVL IV, 30-39 MIN: ICD-10-PCS | Mod: ,,, | Performed by: NURSE PRACTITIONER

## 2023-07-06 PROCEDURE — 99214 OFFICE O/P EST MOD 30 MIN: CPT | Mod: ,,, | Performed by: NURSE PRACTITIONER

## 2023-07-06 RX ORDER — ROSUVASTATIN CALCIUM 20 MG/1
20 TABLET, COATED ORAL DAILY
Qty: 90 TABLET | Refills: 3 | Status: SHIPPED | OUTPATIENT
Start: 2023-07-06 | End: 2024-07-05

## 2023-07-06 RX ORDER — AMOXICILLIN AND CLAVULANATE POTASSIUM 875; 125 MG/1; MG/1
1 TABLET, FILM COATED ORAL EVERY 12 HOURS
Qty: 14 TABLET | Refills: 0 | Status: SHIPPED | OUTPATIENT
Start: 2023-07-06 | End: 2023-07-13

## 2023-07-06 RX ORDER — MUPIROCIN 20 MG/G
OINTMENT TOPICAL 3 TIMES DAILY
Qty: 22 G | Refills: 0 | Status: SHIPPED | OUTPATIENT
Start: 2023-07-06

## 2023-07-06 NOTE — PROGRESS NOTES
[facility]       PATIENT NAME: Chano Clement   : 1950    AGE: 72 y.o. DATE OF ENCOUNTER: 23    MRN: [id number]      PCP: ARGENTINA West    Reason for Visit / Chief Complaint:  Dizziness (Patient presents to the clinic dizziness, BP ended up being a bit low(HA/'surges in head' pulsating). Weakness in L leg, threw balance off but did not fall. (If getting something today would like to be sent to [facility])), Nausea (Cymbalta and lexapro causing dementia?), and Hand Injury (Looks to be infected.)         274}    Subjective:     HPI:    Presents as a work in c/o several issues.  Had dizziness & headache yesterday; /53.  Today mild headache, fatigue; sleep disruption.   Pulsing type sensation in head last night, lying down helped but if he got up it would return.  Denies sensation today.    Dog scratched right hand the other day. Has been washing and applying abx ointment but it appears to be getting infected.  Last tetanus 19.      Denies memory issues, but read that taking lexapro & cymbalta can cause dementia.    Chronic neuropathy LLE.  Followed by [facility] for chronic low back pain with LLE sciatica and chronic arthralgias.    Review of Systems:   Review of Systems   Constitutional:  Positive for fatigue. Negative for chills and fever.   Respiratory: Negative.     Cardiovascular: Negative.    Gastrointestinal: Negative.    Musculoskeletal:  Positive for arthralgias (chronic) and back pain (chronic).   Neurological:  Positive for dizziness, numbness (chronic LLE) and headaches.   Psychiatric/Behavioral: Negative.       Allergies and Meds: 274}   Review of patient's allergies indicates:  No Known Allergies     Current Outpatient Medications:     albuterol (VENTOLIN HFA) 90 mcg/actuation inhaler, Inhale 2 puffs into the lungs every 4 (four) hours as needed for Wheezing or Shortness of Breath. Rescue, Disp: 18 g, Rfl: 1    cyclobenzaprine  (FLEXERIL) 10 MG tablet, Take 10 mg by mouth daily as needed., Disp: , Rfl:     diclofenac sodium (VOLTAREN) 1 % Gel, Apply to back four times daily as needed for pain, Disp: 3000 g, Rfl: 5    docusate sodium (COLACE) 100 MG capsule, Take 1 capsule (100 mg total) by mouth 2 (two) times daily., Disp: 60 capsule, Rfl: 1    DULoxetine (CYMBALTA) 60 MG capsule, Take 1 capsule (60 mg total) by mouth 2 (two) times daily., Disp: 180 capsule, Rfl: 3    EScitalopram oxalate (LEXAPRO) 20 MG tablet, Take 1 tablet (20 mg total) by mouth once daily., Disp: 90 tablet, Rfl: 3    gabapentin (NEURONTIN) 600 MG tablet, Take 2 tablets (1,200 mg total) by mouth 2 (two) times daily., Disp: 360 tablet, Rfl: 1    HYDROcodone-acetaminophen (NORCO)  mg per tablet, Take 1 tablet by mouth 3 (three) times daily as needed for Pain., Disp: 90 tablet, Rfl: 0    lisinopriL (PRINIVIL,ZESTRIL) 40 MG tablet, Take 1 tablet (40 mg total) by mouth once daily., Disp: 90 tablet, Rfl: 3    loratadine (CLARITIN) 10 mg tablet, Take 1 tablet (10 mg total) by mouth once daily., Disp: 90 tablet, Rfl: 3    metFORMIN (GLUCOPHAGE-XR) 500 MG ER 24hr tablet, Take 1 tablet (500 mg total) by mouth daily with dinner or evening meal., Disp: 90 tablet, Rfl: 3    NARCAN 4 mg/actuation Spry, 1 spray by Nasal route daily as needed., Disp: , Rfl:     pramipexole (MIRAPEX) 0.25 MG tablet, Take 1 tablet (0.25 mg total) by mouth 2 (two) times daily. (Patient taking differently: Take 0.25 mg by mouth once daily.), Disp: 180 tablet, Rfl: 1    tamsulosin (FLOMAX) 0.4 mg Cap, Take 1 capsule (0.4 mg total) by mouth once daily., Disp: 90 capsule, Rfl: 3    traZODone (DESYREL) 50 MG tablet, Take 4 tablets (200 mg total) by mouth every evening., Disp: 360 tablet, Rfl: 3    amoxicillin-clavulanate 875-125mg (AUGMENTIN) 875-125 mg per tablet, Take 1 tablet by mouth every 12 (twelve) hours. for 7 days, Disp: 14 tablet, Rfl: 0    mupirocin (BACTROBAN) 2 % ointment, Apply topically 3  (three) times daily., Disp: 22 g, Rfl: 0    rosuvastatin (CRESTOR) 20 MG tablet, Take 1 tablet (20 mg total) by mouth once daily., Disp: 90 tablet, Rfl: 3    Labs:274}   I have reviewed labs below:  Lab Results   Component Value Date    WBC 11.02 (H) 2023    RBC 4.85 2023    HGB 14.3 2023    HCT 44.2 2023     2023     2023    K 4.4 2023     (H) 2023    CALCIUM 9.0 2023     (H) 2023    BUN 17 2023    CREATININE 0.88 2023    EGFRNONAA 80 2022    ALT 21 2023    AST 9 (L) 2023    CHOL 187 2023    TRIG 37 2023    HDL 72 (H) 2023    LDLCALC 108 2023    TSH 1.250 2022    PSA 1.140 10/12/2022    HGBA1C 6.3 2023       Medical History: 274}     Past Medical History:   Diagnosis Date    Anxiety disorder, unspecified     Chronic bilateral low back pain with left-sided sciatica 2022    Chronic rhinitis 2022    Depressive disorder     Essential (primary) hypertension     GERD (gastroesophageal reflux disease)     History of colon polyps 2022    Hyperlipidemia     RAYMUNDO on CPAP     Prediabetes 2023    Lab Results Component Value Date  HGBA1C 6.3 2023      RLS (restless legs syndrome)     TIA (transient ischemic attack)       Social History     Tobacco Use   Smoking Status Former    Packs/day: 1.00    Years: 10.00    Pack years: 10.00    Types: Cigarettes    Quit date:     Years since quittin.5   Smokeless Tobacco Never      Health Maintenance: 274}     Health Maintenance         Date Due Completion Date    Shingles Vaccine (1 of 2) 10/12/2023 (Originally 1999) ---    COVID-19 Vaccine (3 - Moderna series) 10/12/2023 (Originally 2021) 3/1/2021    Influenza Vaccine (1) 2023 10/12/2022    PROSTATE-SPECIFIC ANTIGEN 10/12/2023 10/12/2022    Eye Exam 2023    Override on 2022: Done ([facility])    Hemoglobin  "A1c (Prediabetes) 04/19/2024 4/19/2023    High Dose Statin 07/06/2024 7/6/2023    Colorectal Cancer Screening 05/10/2026 5/10/2021    Lipid Panel 04/19/2028 4/19/2023    TETANUS VACCINE 01/06/2029 1/6/2019            Objective:  274}   /61 (BP Location: Right arm, Patient Position: Sitting, BP Method: X-Large (Automatic))   Pulse 66   Temp 98.5 °F (36.9 °C) (Oral)   Resp 20   Ht 5' 10" (1.778 m)   Wt 109.8 kg (242 lb)   SpO2 99%   BMI 34.72 kg/m²     Wt Readings from Last 3 Encounters:   07/06/23 109.8 kg (242 lb)   05/22/23 108 kg (238 lb)   04/19/23 110.7 kg (244 lb)     BP Readings from Last 3 Encounters:   07/06/23 113/61   05/22/23 122/66   04/19/23 (!) 146/87     Body mass index is 34.72 kg/m².     Physical Exam  Vitals and nursing note reviewed.   Constitutional:       General: He is not in acute distress.     Appearance: Normal appearance. He is not ill-appearing.   HENT:      Head: Normocephalic.      Right Ear: Tympanic membrane, ear canal and external ear normal.      Left Ear: Tympanic membrane, ear canal and external ear normal.      Nose: Nose normal.      Mouth/Throat:      Mouth: Mucous membranes are moist.      Pharynx: Oropharynx is clear.   Eyes:      Conjunctiva/sclera: Conjunctivae normal.   Neck:      Vascular: No carotid bruit.   Cardiovascular:      Rate and Rhythm: Normal rate and regular rhythm.      Heart sounds: Normal heart sounds.   Pulmonary:      Effort: Pulmonary effort is normal. No respiratory distress.      Breath sounds: Normal breath sounds.   Musculoskeletal:      Cervical back: Neck supple.      Right lower leg: No edema.      Left lower leg: No edema.   Lymphadenopathy:      Cervical: No cervical adenopathy.   Skin:     General: Skin is warm and dry.      Findings: Abrasion (right dorsal hand with purulent drainage and mild surrounding redness) present.   Neurological:      General: No focal deficit present.      Mental Status: He is alert and oriented to person, " place, and time.      Gait: Gait normal.   Psychiatric:         Mood and Affect: Mood normal.         Behavior: Behavior normal.        Assessment and Plan: 274}     1. Dizziness  -     Radiology US Carotid Bilateral; Future; Expected date: 07/06/2023    2. Abrasion of hand with infection, right, initial encounter  -     amoxicillin-clavulanate 875-125mg (AUGMENTIN) 875-125 mg per tablet; Take 1 tablet by mouth every 12 (twelve) hours. for 7 days  Dispense: 14 tablet; Refill: 0  -     mupirocin (BACTROBAN) 2 % ointment; Apply topically 3 (three) times daily.  Dispense: 22 g; Refill: 0    3. Pure hypercholesterolemia  Comments:  Has never tried a statin.  History TIA.  Start rosuvastatin and let me know if any problems.  Stop Zetia.  Orders:  -     Radiology US Carotid Bilateral; Future; Expected date: 07/06/2023  -     rosuvastatin (CRESTOR) 20 MG tablet; Take 1 tablet (20 mg total) by mouth once daily.  Dispense: 90 tablet; Refill: 3    4. Former smoker    5. Atherosclerotic cardiovascular disease  Comments:  Start statin.  Orders:  -     Radiology US Carotid Bilateral; Future; Expected date: 07/06/2023       Stay well hydrated.  Get carotid ultrasound.  If headaches and unusual sensations in his head persist, we will get CT or MRI.  Declines memory issues and has been on Lexapro and Cymbalta long-term without problems so no need to change.  Treat secondary infection from abrasion caused by dog.    Return to clinic as scheduled; and sooner as needed.    Future Appointments   Date Time Provider Department Center   7/18/2023 11:00 AM [facility] US[facility]   10/25/2023 10:40 AM ARGENTINA West [facility] DUC [place]   1/4/2024  1:00 PM AWV NURSE, [facility] FAMILY MEDICINE [facility] [place]        Signature:  ARGENTINA West

## 2023-07-18 ENCOUNTER — HOSPITAL ENCOUNTER (OUTPATIENT)
Dept: RADIOLOGY | Facility: HOSPITAL | Age: 73
Discharge: HOME | End: 2023-07-18
Attending: NURSE PRACTITIONER
Payer: MEDICARE

## 2023-07-18 DIAGNOSIS — R42 DIZZINESS: ICD-10-CM

## 2023-07-18 DIAGNOSIS — I10 ESSENTIAL (PRIMARY) HYPERTENSION: Chronic | ICD-10-CM

## 2023-07-18 DIAGNOSIS — E78.00 PURE HYPERCHOLESTEROLEMIA: ICD-10-CM

## 2023-07-18 DIAGNOSIS — I25.10 ATHEROSCLEROTIC CARDIOVASCULAR DISEASE: ICD-10-CM

## 2023-07-18 DIAGNOSIS — J31.0 CHRONIC RHINITIS: Chronic | ICD-10-CM

## 2023-07-18 PROCEDURE — 93880 EXTRACRANIAL BILAT STUDY: CPT | Mod: TC

## 2023-07-18 PROCEDURE — 93880 US CAROTID BILATERAL: ICD-10-PCS | Mod: 26,,, | Performed by: RADIOLOGY

## 2023-07-18 PROCEDURE — 93880 EXTRACRANIAL BILAT STUDY: CPT | Mod: 26,,, | Performed by: RADIOLOGY

## 2023-07-18 RX ORDER — LISINOPRIL 40 MG/1
40 TABLET ORAL DAILY
Qty: 90 TABLET | Refills: 3 | Status: SHIPPED | OUTPATIENT
Start: 2023-07-18

## 2023-07-18 RX ORDER — LORATADINE 10 MG/1
10 TABLET ORAL DAILY
Qty: 90 TABLET | Refills: 3 | Status: SHIPPED | OUTPATIENT
Start: 2023-07-18

## 2023-07-18 NOTE — TELEPHONE ENCOUNTER
----- Message from Lila Metz sent at 7/18/2023  3:01 PM CDT -----  LINSINOPRIL,LORATADINE TO [facility] PT [phone]

## 2023-07-18 NOTE — PROGRESS NOTES
Call patient and review results.  50% or less blockage bilateral carotids.  No intervention needed.  Continue rosuvastatin to prevent plaque buildup.

## 2023-07-19 ENCOUNTER — TELEPHONE (OUTPATIENT)
Dept: FAMILY MEDICINE | Facility: CLINIC | Age: 73
End: 2023-07-19
Payer: COMMERCIAL

## 2023-07-19 DIAGNOSIS — G89.29 CHRONIC BILATERAL LOW BACK PAIN WITH LEFT-SIDED SCIATICA: Primary | Chronic | ICD-10-CM

## 2023-07-19 DIAGNOSIS — M51.36 DDD (DEGENERATIVE DISC DISEASE), LUMBAR: ICD-10-CM

## 2023-07-19 DIAGNOSIS — M54.42 CHRONIC BILATERAL LOW BACK PAIN WITH LEFT-SIDED SCIATICA: Primary | Chronic | ICD-10-CM

## 2023-07-19 PROBLEM — M51.369 DDD (DEGENERATIVE DISC DISEASE), LUMBAR: Status: ACTIVE | Noted: 2023-07-19

## 2023-07-19 NOTE — TELEPHONE ENCOUNTER
----- Message from Liz Phelps sent at 7/19/2023  4:14 PM CDT -----  Pt need referral  Dr Jed barriga in [place] , [place] for his back Pt # [phone]

## 2023-08-01 DIAGNOSIS — R73.03 PREDIABETES: ICD-10-CM

## 2023-08-01 RX ORDER — METFORMIN HYDROCHLORIDE 500 MG/1
500 TABLET, EXTENDED RELEASE ORAL
Qty: 90 TABLET | Refills: 3 | Status: SHIPPED | OUTPATIENT
Start: 2023-08-01 | End: 2023-11-21 | Stop reason: SDUPTHER

## 2023-08-01 NOTE — TELEPHONE ENCOUNTER
----- Message from Liz Phelps sent at 8/1/2023  9:06 AM CDT -----  Pt need another refill on his metFORMIN sent it to [facility] because [facility] has [facility] # [phone]

## 2023-09-11 DIAGNOSIS — G25.81 RLS (RESTLESS LEGS SYNDROME): Primary | Chronic | ICD-10-CM

## 2023-09-11 RX ORDER — PRAMIPEXOLE DIHYDROCHLORIDE 0.25 MG/1
0.25 TABLET ORAL NIGHTLY
Qty: 90 TABLET | Refills: 3 | Status: SHIPPED | OUTPATIENT
Start: 2023-09-11 | End: 2024-09-10

## 2023-09-11 NOTE — TELEPHONE ENCOUNTER
----- Message from Liz Phelps sent at 9/11/2023  8:25 AM CDT -----  Pt need refill pramipexole  [facility] # [phone]

## 2023-09-20 LAB
LEFT EYE DM RETINOPATHY: NEGATIVE
RIGHT EYE DM RETINOPATHY: NEGATIVE

## 2023-11-09 ENCOUNTER — OFFICE VISIT (OUTPATIENT)
Dept: FAMILY MEDICINE | Facility: CLINIC | Age: 73
End: 2023-11-09
Payer: MEDICARE

## 2023-11-09 VITALS
RESPIRATION RATE: 20 BRPM | BODY MASS INDEX: 35.5 KG/M2 | DIASTOLIC BLOOD PRESSURE: 85 MMHG | SYSTOLIC BLOOD PRESSURE: 138 MMHG | TEMPERATURE: 98 F | WEIGHT: 248 LBS | HEIGHT: 70 IN | OXYGEN SATURATION: 95 % | HEART RATE: 81 BPM
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
## 2023-11-09 DIAGNOSIS — S40.862A INSECT BITE OF LEFT UPPER ARM, INITIAL ENCOUNTER: Primary | ICD-10-CM

## 2023-11-09 PROCEDURE — 99213 OFFICE O/P EST LOW 20 MIN: CPT | Mod: ,,, | Performed by: NURSE PRACTITIONER

## 2023-11-09 PROCEDURE — 99213 PR OFFICE/OUTPT VISIT, EST, LEVL III, 20-29 MIN: ICD-10-PCS | Mod: ,,, | Performed by: NURSE PRACTITIONER

## 2023-11-09 RX ORDER — CEPHALEXIN 500 MG/1
500 CAPSULE ORAL EVERY 12 HOURS
Qty: 14 CAPSULE | Refills: 0 | Status: SHIPPED | OUTPATIENT
Start: 2023-11-09 | End: 2023-11-16

## 2023-11-09 RX ORDER — HYDROCORTISONE 25 MG/G
CREAM TOPICAL 2 TIMES DAILY
Qty: 28 G | Refills: 0 | Status: SHIPPED | OUTPATIENT
Start: 2023-11-09

## 2023-11-09 RX ORDER — HYDROCORTISONE 25 MG/G
CREAM TOPICAL 2 TIMES DAILY
Qty: 28 G | Refills: 0 | Status: SHIPPED | OUTPATIENT
Start: 2023-11-09 | End: 2023-11-09 | Stop reason: CLARIF

## 2023-11-09 RX ORDER — CEPHALEXIN 500 MG/1
500 CAPSULE ORAL EVERY 12 HOURS
Qty: 14 CAPSULE | Refills: 0 | Status: SHIPPED | OUTPATIENT
Start: 2023-11-09 | End: 2023-11-09 | Stop reason: CLARIF

## 2023-11-10 NOTE — PROGRESS NOTES
ARGENTINA Perez        PATIENT NAME: Chano Clement  : 1950  DATE: 23  MRN: [id number]      Patient PCP Information       Provider PCP Type    [facility]            Reason for Visit / Chief Complaint: Insect Bite (Patient presents to the clinic complaint of bite on right arm/Rm4/)           History of Present Illness / Problem Focused Workflow     Chano Clement presents to the clinic with Insect Bite (Patient presents to the clinic complaint of bite on right arm/Rm4/)     Insect Bite  Pertinent negatives include no abdominal pain, arthralgias, chest pain, chills, congestion, coughing, diaphoresis, fatigue, fever, headaches, joint swelling, myalgias, nausea, numbness, rash, sore throat or weakness.     Mr Clement presents to clinic with insect bites to left arm and shoulder. Patient did not witness bites. States he has been outside working on old truck, may have come off truck.   Patient with with redness and swelling around each bite. Larger area of surrounding redness around left underarm bite.     Review of Systems     Review of Systems   Constitutional:  Negative for activity change, appetite change, chills, diaphoresis, fatigue, fever and unexpected weight change.   HENT:  Negative for congestion, ear pain, facial swelling, hearing loss, nosebleeds and sore throat.    Eyes: Negative.    Respiratory:  Negative for apnea, cough, shortness of breath and wheezing.    Cardiovascular:  Negative for chest pain, palpitations and leg swelling.   Gastrointestinal:  Negative for abdominal distention, abdominal pain, blood in stool, constipation, diarrhea and nausea.   Endocrine: Negative for cold intolerance, heat intolerance, polydipsia, polyphagia and polyuria.   Genitourinary:  Negative for decreased urine volume, difficulty urinating, dysuria, flank pain, frequency, hematuria and urgency.   Musculoskeletal:  Negative for arthralgias, joint swelling and myalgias.   Skin:   Negative for color change and rash.        Bites/redness/swelling   Allergic/Immunologic: Negative.    Neurological:  Negative for dizziness, tremors, seizures, syncope, facial asymmetry, speech difficulty, weakness, light-headedness, numbness and headaches.   Hematological:  Negative for adenopathy. Does not bruise/bleed easily.   Psychiatric/Behavioral:  Negative for behavioral problems and confusion.        Medical / Social / Family History     Past Medical History:   Diagnosis Date    Anxiety disorder, unspecified     Chronic bilateral low back pain with left-sided sciatica 04/12/2022    Chronic rhinitis 04/12/2022    Depressive disorder     Essential (primary) hypertension     GERD (gastroesophageal reflux disease)     History of colon polyps 04/12/2022    Hyperlipidemia     RAYMUNDO on CPAP     Prediabetes 4/19/2023    Lab Results Component Value Date  HGBA1C 6.3 04/19/2023      RLS (restless legs syndrome)     TIA (transient ischemic attack)        Past Surgical History:   Procedure Laterality Date    BACK SURGERY      Caudal HARPER  02/18/2013    Dr Rdz    FOOT SURGERY Left     Left l4-L5 TFESI Left 06/20/2012    Dr Rdz    SHOULDER ARTHROSCOPY      T3-T4 HARPER  02/27/2012    Dr Rdz    TONSILLECTOMY      UVULECTOMY         Social History    reports that he quit smoking about 48 years ago. His smoking use included cigarettes. He started smoking about 58 years ago. He has a 10.0 pack-year smoking history. He has never used smokeless tobacco. He reports current alcohol use. He reports that he does not use drugs.    Family History  's family history includes Arthritis in his sister; Heart attack in his father; Heart disease in his father and mother; Hyperlipidemia in his son; Hypertension in his son; No Known Problems in his brother, brother, maternal grandfather, maternal grandmother, paternal grandfather, paternal grandmother, and sister; Rheumatic fever in his mother.    Medications and Allergies  "    Medications  No outpatient medications have been marked as taking for the 11/9/23 encounter (Office Visit) with Selam De La Torre FNP.       Allergies  Review of patient's allergies indicates:  No Known Allergies    Physical Examination     Vitals:    11/09/23 1331   BP: 138/85   BP Location: Right arm   Patient Position: Sitting   BP Method: X-Large (Automatic)   Pulse: 81   Resp: 20   Temp: 97.7 °F (36.5 °C)   TempSrc: Oral   SpO2: 95%   Weight: 112.5 kg (248 lb)   Height: 5' 10" (1.778 m)       Physical Exam  Vitals reviewed.   Constitutional:       Appearance: Normal appearance.   HENT:      Head: Normocephalic.      Right Ear: External ear normal.      Left Ear: External ear normal.      Nose: Nose normal.      Mouth/Throat:      Mouth: Mucous membranes are moist.   Eyes:      Extraocular Movements: Extraocular movements intact.   Cardiovascular:      Rate and Rhythm: Normal rate and regular rhythm.      Pulses: Normal pulses.      Heart sounds: Normal heart sounds.   Pulmonary:      Effort: Pulmonary effort is normal.      Breath sounds: Normal breath sounds.   Abdominal:      Palpations: Abdomen is soft.   Musculoskeletal:         General: Normal range of motion.      Cervical back: Normal range of motion.   Skin:     General: Skin is warm and dry.      Capillary Refill: Capillary refill takes less than 2 seconds.      Findings: Rash present. Rash is urticarial.             Comments: Insect bites. Left underarm with increased redness and swelling reaction vs cellulitis   Neurological:      General: No focal deficit present.      Mental Status: He is alert and oriented to person, place, and time.   Psychiatric:         Mood and Affect: Mood normal.         Behavior: Behavior normal.         Thought Content: Thought content normal.         Judgment: Judgment normal.           No visits with results within 14 Day(s) from this visit.   Latest known visit with results is:   Office Visit on 04/19/2023 "   Component Date Value Ref Range Status    Sodium 04/19/2023 142  136 - 145 mmol/L Final    Potassium 04/19/2023 4.4  3.5 - 5.1 mmol/L Final    Chloride 04/19/2023 109 (H)  98 - 107 mmol/L Final    CO2 04/19/2023 30  21 - 32 mmol/L Final    Anion Gap 04/19/2023 7  7 - 16 mmol/L Final    Glucose 04/19/2023 109 (H)  74 - 106 mg/dL Final    BUN 04/19/2023 17  7 - 18 mg/dL Final    Creatinine 04/19/2023 0.88  0.70 - 1.30 mg/dL Final    BUN/Creatinine Ratio 04/19/2023 19  6 - 20 Final    Calcium 04/19/2023 9.0  8.5 - 10.1 mg/dL Final    Total Protein 04/19/2023 6.4  6.4 - 8.2 g/dL Final    Albumin 04/19/2023 3.5  3.5 - 5.0 g/dL Final    Globulin 04/19/2023 2.9  2.0 - 4.0 g/dL Final    A/G Ratio 04/19/2023 1.2   Final    Bilirubin, Total 04/19/2023 0.5  >0.0 - 1.2 mg/dL Final    Alk Phos 04/19/2023 85  45 - 115 U/L Final    ALT 04/19/2023 21  16 - 61 U/L Final    AST 04/19/2023 9 (L)  15 - 37 U/L Final    eGFR 04/19/2023 91  >=60 mL/min/1.73m² Final    Hemoglobin A1C 04/19/2023 6.3  4.5 - 6.6 % Final      Normal:               <5.7%  Pre-Diabetic:       5.7% to 6.4%  Diabetic:             >6.4%  Diabetic Goal:     <7%    Estimated Average Glucose 04/19/2023 124  mg/dL Final    Triglycerides 04/19/2023 37  35 - 150 mg/dL Final      Normal:  <150 mg/dL  Borderline High: 150-199 mg/dL  High:   200-499 mg/dL  Very High:  >=500    Cholesterol 04/19/2023 187  0 - 200 mg/dL Final      <200 mg/dL:  Desirable  200-240 mg/dL: Borderline High  >240 mg/dL:  High    HDL Cholesterol 04/19/2023 72 (H)  40 - 60 mg/dL Final      <40 mg/dL: Low HDL  40-60 mg/dL: Normal  >60 mg/dL: Desirable    Cholesterol/HDL Ratio (Risk Factor) 04/19/2023 2.6   Final    Non-HDL 04/19/2023 115  mg/dL Final    LDL Calculated 04/19/2023 108  mg/dL Final    Unable to calculate due to one of the following values:  Cholesterol <5  HDL Cholesterol <5  Triglycerides <10 or >400    LDL/HDL 04/19/2023 1.5   Final    Unable to calculate due to one of the following  values:  Cholesterol <5  HDL Cholesterol <5  Triglycerides <10 or >400    VLDL 04/19/2023 7  mg/dL Final             Assessment and Plan (including Health Maintenance)         Plan:   Insect bite of left upper arm, initial encounter  Comments:  cellulitis vs reaction  Orders:  -     hydrocortisone 2.5 % cream; Apply topically 2 (two) times daily.  Dispense: 28 g; Refill: 0  -     cephALEXin (KEFLEX) 500 MG capsule; Take 1 capsule (500 mg total) by mouth every 12 (twelve) hours. for 7 days  Dispense: 14 capsule; Refill: 0    RTC prn   There are no Patient Instructions on file for this visit.       Health Maintenance Due   Topic Date Due    Aspirin/Antiplatelet Therapy  Never done    Shingles Vaccine (1 of 2) Never done    RSV Vaccine (Age 60+) (1 - 1-dose 60+ series) Never done    Influenza Vaccine (1) 09/01/2023    COVID-19 Vaccine (3 - 2023-24 season) 09/01/2023    PROSTATE-SPECIFIC ANTIGEN  10/12/2023    Eye Exam  12/01/2023       Most Recent Immunizations   Administered Date(s) Administered    COVID-19, MRNA, LN-S, PF (MODERNA FULL 0.5 ML DOSE) 03/01/2021    Hepatitis B, Adult 05/30/2000    Influenza (FLUAD) - Quadrivalent - Adjuvanted - PF *Preferred* (65+) 10/12/2022    Influenza - Quadrivalent - High Dose - PF (65 years and older) 01/04/2022    Influenza Whole 02/05/2002    Pneumococcal Conjugate - 13 Valent 01/24/2015    Pneumococcal Conjugate - 20 Valent 07/16/2019    Pneumococcal Polysaccharide - 23 Valent 03/10/2016    Td (ADULT) 11/23/1998    Tdap 01/06/2019        Problem List Items Addressed This Visit    None  Visit Diagnoses       Insect bite of left upper arm, initial encounter    -  Primary    cellulitis vs reaction    Relevant Medications    hydrocortisone 2.5 % cream    cephALEXin (KEFLEX) 500 MG capsule            Health Maintenance Topics with due status: Not Due       Topic Last Completion Date    TETANUS VACCINE 01/06/2019    Colorectal Cancer Screening 05/10/2021    Hemoglobin A1c  (Prediabetes) 04/19/2023    Lipid Panel 04/19/2023    High Dose Statin 07/06/2023       Future Appointments   Date Time Provider Department Center   11/14/2023 10:40 AM Calista Wright FNP [facility] DUC [place]   1/4/2024  1:00 PM AWOSCAR NURSE, [facility] FAMILY MEDICINE [facility] DUC [place]            Signature:  ARGENTINA Perez  [facility]     Date of encounter: 11/9/23

## 2023-11-14 ENCOUNTER — TELEPHONE (OUTPATIENT)
Dept: FAMILY MEDICINE | Facility: CLINIC | Age: 73
End: 2023-11-14
Payer: COMMERCIAL

## 2023-11-14 ENCOUNTER — OFFICE VISIT (OUTPATIENT)
Dept: FAMILY MEDICINE | Facility: CLINIC | Age: 73
End: 2023-11-14
Payer: MEDICARE

## 2023-11-14 VITALS
WEIGHT: 245 LBS | BODY MASS INDEX: 35.07 KG/M2 | OXYGEN SATURATION: 95 % | HEIGHT: 70 IN | DIASTOLIC BLOOD PRESSURE: 70 MMHG | RESPIRATION RATE: 20 BRPM | SYSTOLIC BLOOD PRESSURE: 132 MMHG | HEART RATE: 72 BPM | TEMPERATURE: 99 F

## 2023-11-14 DIAGNOSIS — M54.42 CHRONIC BILATERAL LOW BACK PAIN WITH LEFT-SIDED SCIATICA: Chronic | ICD-10-CM

## 2023-11-14 DIAGNOSIS — R73.03 PREDIABETES: Chronic | ICD-10-CM

## 2023-11-14 DIAGNOSIS — F33.9 RECURRENT DEPRESSION: Chronic | ICD-10-CM

## 2023-11-14 DIAGNOSIS — I25.10 ATHEROSCLEROTIC CARDIOVASCULAR DISEASE: Chronic | ICD-10-CM

## 2023-11-14 DIAGNOSIS — M62.838 MUSCLE SPASM: ICD-10-CM

## 2023-11-14 DIAGNOSIS — Z79.899 ENCOUNTER FOR LONG-TERM (CURRENT) USE OF OTHER MEDICATIONS: ICD-10-CM

## 2023-11-14 DIAGNOSIS — G89.29 CHRONIC BILATERAL LOW BACK PAIN WITH LEFT-SIDED SCIATICA: Chronic | ICD-10-CM

## 2023-11-14 DIAGNOSIS — Z23 FLU VACCINE NEED: ICD-10-CM

## 2023-11-14 DIAGNOSIS — M51.36 DDD (DEGENERATIVE DISC DISEASE), LUMBAR: Chronic | ICD-10-CM

## 2023-11-14 DIAGNOSIS — I10 ESSENTIAL (PRIMARY) HYPERTENSION: Primary | Chronic | ICD-10-CM

## 2023-11-14 PROBLEM — I70.0 AORTIC ATHEROSCLEROSIS: Chronic | Status: ACTIVE | Noted: 2023-05-22

## 2023-11-14 LAB
ALBUMIN SERPL BCP-MCNC: 3.8 G/DL (ref 3.5–5)
ALBUMIN/GLOB SERPL: 1.2 {RATIO}
ALP SERPL-CCNC: 84 U/L (ref 45–115)
ALT SERPL W P-5'-P-CCNC: 27 U/L (ref 16–61)
ANION GAP SERPL CALCULATED.3IONS-SCNC: 14 MMOL/L (ref 7–16)
AST SERPL W P-5'-P-CCNC: 14 U/L (ref 15–37)
BASOPHILS # BLD AUTO: 0.04 K/UL (ref 0–0.2)
BASOPHILS NFR BLD AUTO: 0.4 % (ref 0–1)
BILIRUB SERPL-MCNC: 0.4 MG/DL (ref ?–1.2)
BUN SERPL-MCNC: 19 MG/DL (ref 7–18)
BUN/CREAT SERPL: 22 (ref 6–20)
CALCIUM SERPL-MCNC: 9 MG/DL (ref 8.5–10.1)
CHLORIDE SERPL-SCNC: 108 MMOL/L (ref 98–107)
CHOLEST SERPL-MCNC: 134 MG/DL (ref 0–200)
CHOLEST/HDLC SERPL: 2.1 {RATIO}
CO2 SERPL-SCNC: 24 MMOL/L (ref 21–32)
CREAT SERPL-MCNC: 0.87 MG/DL (ref 0.7–1.3)
CREAT UR-MCNC: 115 MG/DL (ref 39–259)
DIFFERENTIAL METHOD BLD: ABNORMAL
EGFR (NO RACE VARIABLE) (RUSH/TITUS): 91 ML/MIN/1.73M2
EOSINOPHIL # BLD AUTO: 0.85 K/UL (ref 0–0.5)
EOSINOPHIL NFR BLD AUTO: 7.7 % (ref 1–4)
ERYTHROCYTE [DISTWIDTH] IN BLOOD BY AUTOMATED COUNT: 13.7 % (ref 11.5–14.5)
EST. AVERAGE GLUCOSE BLD GHB EST-MCNC: 134 MG/DL
GLOBULIN SER-MCNC: 3.3 G/DL (ref 2–4)
GLUCOSE SERPL-MCNC: 92 MG/DL (ref 74–106)
HBA1C MFR BLD HPLC: 6.3 % (ref 4.5–6.6)
HCT VFR BLD AUTO: 42.6 % (ref 40–54)
HDLC SERPL-MCNC: 65 MG/DL (ref 40–60)
HGB BLD-MCNC: 14.1 G/DL (ref 13.5–18)
IMM GRANULOCYTES # BLD AUTO: 0.03 K/UL (ref 0–0.04)
IMM GRANULOCYTES NFR BLD: 0.3 % (ref 0–0.4)
LDLC SERPL CALC-MCNC: 58 MG/DL
LYMPHOCYTES # BLD AUTO: 2.7 K/UL (ref 1–4.8)
LYMPHOCYTES NFR BLD AUTO: 24.5 % (ref 27–41)
MCH RBC QN AUTO: 29.9 PG (ref 27–31)
MCHC RBC AUTO-ENTMCNC: 33.1 G/DL (ref 32–36)
MCV RBC AUTO: 90.3 FL (ref 80–96)
MICROALBUMIN UR-MCNC: 0.5 MG/DL (ref 0–2.8)
MICROALBUMIN/CREAT RATIO PNL UR: 4.3 MG/G (ref 0–30)
MONOCYTES # BLD AUTO: 0.94 K/UL (ref 0–0.8)
MONOCYTES NFR BLD AUTO: 8.5 % (ref 2–6)
MPC BLD CALC-MCNC: 11.1 FL (ref 9.4–12.4)
NEUTROPHILS # BLD AUTO: 6.48 K/UL (ref 1.8–7.7)
NEUTROPHILS NFR BLD AUTO: 58.6 % (ref 53–65)
NONHDLC SERPL-MCNC: 69 MG/DL
NRBC # BLD AUTO: 0 X10E3/UL
NRBC, AUTO (.00): 0 %
PLATELET # BLD AUTO: 249 K/UL (ref 150–400)
POTASSIUM SERPL-SCNC: 4.5 MMOL/L (ref 3.5–5.1)
PROT SERPL-MCNC: 7.1 G/DL (ref 6.4–8.2)
RBC # BLD AUTO: 4.72 M/UL (ref 4.6–6.2)
SODIUM SERPL-SCNC: 141 MMOL/L (ref 136–145)
TRIGL SERPL-MCNC: 57 MG/DL (ref 35–150)
TSH SERPL DL<=0.005 MIU/L-ACNC: 1.04 UIU/ML (ref 0.36–3.74)
VLDLC SERPL-MCNC: 11 MG/DL
WBC # BLD AUTO: 11.04 K/UL (ref 4.5–11)

## 2023-11-14 PROCEDURE — 82570 MICROALBUMIN / CREATININE RATIO URINE: ICD-10-PCS | Mod: ,,, | Performed by: CLINICAL MEDICAL LABORATORY

## 2023-11-14 PROCEDURE — 90694 FLU VACCINE - QUADRIVALENT - ADJUVANTED: ICD-10-PCS | Mod: ,,, | Performed by: NURSE PRACTITIONER

## 2023-11-14 PROCEDURE — 83036 HEMOGLOBIN GLYCOSYLATED A1C: CPT | Mod: ,,, | Performed by: CLINICAL MEDICAL LABORATORY

## 2023-11-14 PROCEDURE — 80053 COMPREHENSIVE METABOLIC PANEL: ICD-10-PCS | Mod: ,,, | Performed by: CLINICAL MEDICAL LABORATORY

## 2023-11-14 PROCEDURE — 85025 CBC WITH DIFFERENTIAL: ICD-10-PCS | Mod: ,,, | Performed by: CLINICAL MEDICAL LABORATORY

## 2023-11-14 PROCEDURE — G0008 FLU VACCINE - QUADRIVALENT - ADJUVANTED: ICD-10-PCS | Mod: ,,, | Performed by: NURSE PRACTITIONER

## 2023-11-14 PROCEDURE — 84443 ASSAY THYROID STIM HORMONE: CPT | Mod: ,,, | Performed by: CLINICAL MEDICAL LABORATORY

## 2023-11-14 PROCEDURE — 82570 ASSAY OF URINE CREATININE: CPT | Mod: ,,, | Performed by: CLINICAL MEDICAL LABORATORY

## 2023-11-14 PROCEDURE — 85025 COMPLETE CBC W/AUTO DIFF WBC: CPT | Mod: ,,, | Performed by: CLINICAL MEDICAL LABORATORY

## 2023-11-14 PROCEDURE — 80061 LIPID PANEL: ICD-10-PCS | Mod: ,,, | Performed by: CLINICAL MEDICAL LABORATORY

## 2023-11-14 PROCEDURE — G0008 ADMIN INFLUENZA VIRUS VAC: HCPCS | Mod: ,,, | Performed by: NURSE PRACTITIONER

## 2023-11-14 PROCEDURE — 83036 HEMOGLOBIN A1C: ICD-10-PCS | Mod: ,,, | Performed by: CLINICAL MEDICAL LABORATORY

## 2023-11-14 PROCEDURE — 99214 OFFICE O/P EST MOD 30 MIN: CPT | Mod: ,,, | Performed by: NURSE PRACTITIONER

## 2023-11-14 PROCEDURE — 90694 VACC AIIV4 NO PRSRV 0.5ML IM: CPT | Mod: ,,, | Performed by: NURSE PRACTITIONER

## 2023-11-14 PROCEDURE — 84443 TSH: ICD-10-PCS | Mod: ,,, | Performed by: CLINICAL MEDICAL LABORATORY

## 2023-11-14 PROCEDURE — 80053 COMPREHEN METABOLIC PANEL: CPT | Mod: ,,, | Performed by: CLINICAL MEDICAL LABORATORY

## 2023-11-14 PROCEDURE — 80061 LIPID PANEL: CPT | Mod: ,,, | Performed by: CLINICAL MEDICAL LABORATORY

## 2023-11-14 PROCEDURE — 99214 PR OFFICE/OUTPT VISIT, EST, LEVL IV, 30-39 MIN: ICD-10-PCS | Mod: ,,, | Performed by: NURSE PRACTITIONER

## 2023-11-14 PROCEDURE — 82043 MICROALBUMIN / CREATININE RATIO URINE: ICD-10-PCS | Mod: ,,, | Performed by: CLINICAL MEDICAL LABORATORY

## 2023-11-14 PROCEDURE — 82043 UR ALBUMIN QUANTITATIVE: CPT | Mod: ,,, | Performed by: CLINICAL MEDICAL LABORATORY

## 2023-11-14 RX ORDER — ASPIRIN 325 MG
325 TABLET, DELAYED RELEASE (ENTERIC COATED) ORAL DAILY
COMMUNITY
Start: 2023-08-22

## 2023-11-14 RX ORDER — CYCLOBENZAPRINE HCL 10 MG
10 TABLET ORAL NIGHTLY PRN
Qty: 60 TABLET | Refills: 1 | Status: SHIPPED | OUTPATIENT
Start: 2023-11-14 | End: 2024-01-16 | Stop reason: SDUPTHER

## 2023-11-14 NOTE — PROGRESS NOTES
[facility]       PATIENT NAME: Chano Clement   : 1950    AGE: 72 y.o. DATE OF ENCOUNTER: 23    MRN: [id number]      PCP: Calista Wright FNP    Reason for Visit / Chief Complaint:  Follow-up (Patient present to the clinic for a 6m f/u htn also wants to talk to you about his bite he has)         274}    Subjective:     HPI:    Presents for 6 mth f/u HTN.    Reports saw SHIV De La Torre NP 5 days ago for possible insect bite R upper arm tx/ topical hydrocortisone 2.5% cream w/o improvement so stopped it and is still taking cephalexin po.  Hasn't noted change in bumps to back of R shoulder.  Itches, doesn't really hurt or burn.  Just noticed spots on left side this am.    I put a referral in for Dr. Jed Coppola 23 per pt request for chronic LBP w/ radiculopathy and hx lumbar disc surgery per Dr. Coppola, but he never received an appt.  Still requests appt.    Chose to stop lexapro and continued duloxetine, minimal change noted in anxiety & depression w/o lexapro.    Review of Systems:   Review of Systems   Constitutional:  Negative for chills and fever.   Respiratory: Negative.     Cardiovascular: Negative.    Gastrointestinal: Negative.    Musculoskeletal:  Positive for arthralgias (chronic) and back pain (chronic).   Skin:  Positive for rash.   Neurological:  Positive for numbness (chronic LLE). Negative for dizziness and headaches.   Psychiatric/Behavioral: Negative.  Nervous/anxious: chronic, stable.        Allergies and Meds: 274}   Review of patient's allergies indicates:  No Known Allergies     Current Outpatient Medications:     aspirin (ECOTRIN) 325 MG EC tablet, Take 325 mg by mouth once daily., Disp: , Rfl:     diclofenac sodium (VOLTAREN) 1 % Gel, Apply to back four times daily as needed for pain, Disp: 3000 g, Rfl: 5    DULoxetine (CYMBALTA) 60 MG capsule, Take 1 capsule (60 mg total) by mouth 2 (two) times daily., Disp: 180 capsule, Rfl: 3     gabapentin (NEURONTIN) 600 MG tablet, Take 2 tablets (1,200 mg total) by mouth 2 (two) times daily., Disp: 360 tablet, Rfl: 1    HYDROcodone-acetaminophen (NORCO)  mg per tablet, Take 1 tablet by mouth 3 (three) times daily as needed for Pain., Disp: 90 tablet, Rfl: 0    hydrocortisone 2.5 % cream, Apply topically 2 (two) times daily., Disp: 28 g, Rfl: 0    lisinopriL (PRINIVIL,ZESTRIL) 40 MG tablet, Take 1 tablet (40 mg total) by mouth once daily., Disp: 90 tablet, Rfl: 3    loratadine (CLARITIN) 10 mg tablet, Take 1 tablet (10 mg total) by mouth once daily., Disp: 90 tablet, Rfl: 3    metFORMIN (GLUCOPHAGE-XR) 500 MG ER 24hr tablet, Take 1 tablet (500 mg total) by mouth daily with dinner or evening meal., Disp: 90 tablet, Rfl: 3    mupirocin (BACTROBAN) 2 % ointment, Apply topically 3 (three) times daily., Disp: 22 g, Rfl: 0    NARCAN 4 mg/actuation Spry, 1 spray by Nasal route daily as needed., Disp: , Rfl:     pramipexole (MIRAPEX) 0.25 MG tablet, Take 1 tablet (0.25 mg total) by mouth every evening., Disp: 90 tablet, Rfl: 3    rosuvastatin (CRESTOR) 20 MG tablet, Take 1 tablet (20 mg total) by mouth once daily., Disp: 90 tablet, Rfl: 3    tamsulosin (FLOMAX) 0.4 mg Cap, Take 1 capsule (0.4 mg total) by mouth once daily., Disp: 90 capsule, Rfl: 3    traZODone (DESYREL) 50 MG tablet, Take 4 tablets (200 mg total) by mouth every evening., Disp: 360 tablet, Rfl: 3    albuterol (VENTOLIN HFA) 90 mcg/actuation inhaler, Inhale 2 puffs into the lungs every 4 (four) hours as needed for Wheezing or Shortness of Breath. Rescue (Patient not taking: Reported on 11/14/2023), Disp: 18 g, Rfl: 1    cyclobenzaprine (FLEXERIL) 10 MG tablet, Take 1 tablet (10 mg total) by mouth nightly as needed for Muscle spasms., Disp: 60 tablet, Rfl: 1    Labs:274}   I have reviewed labs below:  Lab Results   Component Value Date    WBC 11.02 (H) 01/07/2023    RBC 4.85 01/07/2023    HGB 14.3 01/07/2023    HCT 44.2 01/07/2023      2023     2023    K 4.4 2023     (H) 2023    CALCIUM 9.0 2023     (H) 2023    BUN 17 2023    CREATININE 0.88 2023    EGFRNONAA 80 2022    ALT 21 2023    AST 9 (L) 2023    CHOL 187 2023    TRIG 37 2023    HDL 72 (H) 2023    LDLCALC 108 2023    TSH 1.250 2022    PSA 1.140 10/12/2022    HGBA1C 6.3 2023       Medical History: 274}     Past Medical History:   Diagnosis Date    Anxiety disorder, unspecified     Chronic bilateral low back pain with left-sided sciatica 2022    Chronic rhinitis 2022    Depressive disorder     Essential (primary) hypertension     GERD (gastroesophageal reflux disease)     History of colon polyps 2022    Hyperlipidemia     RAYMUNDO on CPAP     Prediabetes 2023    Lab Results Component Value Date  HGBA1C 6.3 2023      RLS (restless legs syndrome)     TIA (transient ischemic attack)       Social History     Tobacco Use   Smoking Status Former    Current packs/day: 0.00    Average packs/day: 1 pack/day for 10.0 years (10.0 ttl pk-yrs)    Types: Cigarettes    Start date:     Quit date:     Years since quittin.9   Smokeless Tobacco Never      Past Surgical History:   Procedure Laterality Date    BACK SURGERY      Caudal HARPER  2013    Dr Rdz    FOOT SURGERY Left     Left l4-L5 TFESI Left 2012    Dr Rdz    MOLE REMOVAL      SHOULDER ARTHROSCOPY      T3-T4 HARPER  2012    Dr Rdz    TONSILLECTOMY      UVULECTOMY          Health Maintenance: 274}     Health Maintenance         Date Due Completion Date    Shingles Vaccine (1 of 2) Never done ---    RSV Vaccine (Age 60+) (1 - 1-dose 60+ series) Never done ---    COVID-19 Vaccine (3 - - season) 2023 3/1/2021    PROSTATE-SPECIFIC ANTIGEN 10/12/2023 10/12/2022    Eye Exam 2023    Override on 2022: Done ([facility])    High Dose Statin  "11/14/2024 11/14/2023    Aspirin/Antiplatelet Therapy 11/14/2024 11/14/2023    Hemoglobin A1c (Prediabetes) 11/14/2024 11/14/2023    Colorectal Cancer Screening 05/10/2026 5/10/2021    Lipid Panel 11/14/2028 11/14/2023    TETANUS VACCINE 01/06/2029 1/6/2019          Immunization History   Administered Date(s) Administered    COVID-19, MRNA, LN-S, PF (MODERNA FULL 0.5 ML DOSE) 02/01/2021, 03/01/2021    Hepatitis B, Adult 02/29/2000, 05/30/2000    Influenza (FLUAD) - Quadrivalent - Adjuvanted - PF *Preferred* (65+) 10/12/2022, 11/14/2023    Influenza - Quadrivalent - High Dose - PF (65 years and older) 01/04/2022    Influenza Whole 11/02/1999, 02/05/2002    Pneumococcal Conjugate - 13 Valent 01/24/2015    Pneumococcal Conjugate - 20 Valent 07/16/2019    Pneumococcal Polysaccharide - 23 Valent 03/10/2016    Td (ADULT) 11/23/1998    Tdap 06/29/2010, 08/08/2013, 01/10/2017, 01/06/2019     Objective:  274}   /70 (BP Location: Right arm, Patient Position: Sitting, BP Method: X-Large (Automatic))   Pulse 72   Temp 98.8 °F (37.1 °C) (Oral)   Resp 20   Ht 5' 10" (1.778 m)   Wt 111.1 kg (245 lb)   SpO2 95%   BMI 35.15 kg/m²     Wt Readings from Last 3 Encounters:   11/14/23 111.1 kg (245 lb)   11/09/23 112.5 kg (248 lb)   07/06/23 109.8 kg (242 lb)     BP Readings from Last 3 Encounters:   11/14/23 132/70   11/09/23 138/85   07/06/23 113/61     Body mass index is 35.15 kg/m².     Physical Exam  Vitals and nursing note reviewed.   Constitutional:       General: He is not in acute distress.     Appearance: Normal appearance.   HENT:      Head: Normocephalic.   Eyes:      Conjunctiva/sclera: Conjunctivae normal.   Neck:      Thyroid: No thyromegaly or thyroid tenderness.      Trachea: Trachea normal.   Cardiovascular:      Rate and Rhythm: Normal rate and regular rhythm.      Pulses: Normal pulses.      Heart sounds: Normal heart sounds.   Pulmonary:      Effort: Pulmonary effort is normal. No respiratory distress. "      Breath sounds: Normal breath sounds.   Musculoskeletal:      Cervical back: Neck supple.      Right lower leg: No edema.      Left lower leg: No edema.   Lymphadenopathy:      Cervical: No cervical adenopathy.   Skin:     General: Skin is warm and dry.      Findings: Rash (see pics - papular lesion R upper arm with surrounding redness, no drainage; erythematous patches of papules and vesicles to R posterior shoulder and to left trunk as in pics) present.   Neurological:      Mental Status: He is alert and oriented to person, place, and time.   Psychiatric:         Mood and Affect: Mood normal.         Behavior: Behavior normal.        R posterior shoulder      Left side of trunk        Assessment and Plan: 274}     1. Essential (primary) hypertension  Comments:  controlled, continue current meds and treatment  Orders:  -     Comprehensive Metabolic Panel; Future; Expected date: 11/14/2023    2. Prediabetes  Comments:  stable, last A1c 6.3% April 2023, check A1c today  Overview:  Lab Results   Component Value Date    HGBA1C 6.3 04/19/2023         Orders:  -     Comprehensive Metabolic Panel; Future; Expected date: 11/14/2023  -     Microalbumin/Creatinine Ratio, Urine; Future; Expected date: 11/14/2023  -     Hemoglobin A1C; Future; Expected date: 11/14/2023    3. Atherosclerotic cardiovascular disease  Comments:  Stable, continue rosuvastatin 20 mg daily.  Orders:  -     Lipid Panel; Future; Expected date: 11/14/2023    4. Encounter for long-term (current) use of other medications  -     CBC Auto Differential; Future; Expected date: 11/14/2023  -     Comprehensive Metabolic Panel; Future; Expected date: 11/14/2023  -     TSH; Future; Expected date: 11/14/2023    5. Flu vaccine need  -     Influenza (FLUAD) - Quadrivalent (Adjuvanted) *Preferred* (65+) (PF)    6. Muscle spasm  -     cyclobenzaprine (FLEXERIL) 10 MG tablet; Take 1 tablet (10 mg total) by mouth nightly as needed for Muscle spasms.  Dispense: 60  tablet; Refill: 1    7. Chronic bilateral low back pain with left-sided sciatica  Comments:  not controlled, progressing; have Maria Isabel check on referral appt to Neurosurgeon    8. DDD (degenerative disc disease), lumbar  Comments:  pain not controlled    9. Recurrent depression  Comments:  Fairly well controlled with duloxetine.  Denies need for additional treatment, doing okay since stopping Lexapro.    Rash appears to be an outbreak of shingles with new outbreak noted to left side, too late to benefit from antiviral.  Advised to complete cephalexin due to continue increased redness surrounding lesion R upper arm.    Return to clinic 6 mth f/u HTN, HLD, fasting labs; and sooner as needed.    Future Appointments   Date Time Provider Department Center   1/4/2024  1:00 PM AWV NURSE, [facility] [place]   5/20/2024  8:00 AM Calista Wright FNP [facility] [place]        Signature:  ARGENTINA West

## 2023-11-14 NOTE — TELEPHONE ENCOUNTER
----- Message from ARGENTINA West sent at 11/14/2023  1:45 PM CST -----  Regarding: Referral  I put a referral in for Dr. Jed Coppola 7/19/23 per pt request for chronic LBP w/ radiculopathy and hx lumbar disc surgery per Dr. Coppola, but he never received an appt.  Still requests appt.

## 2023-11-20 NOTE — PROGRESS NOTES
Call pt and review results. Labs are okay.  A1c unchanged at 6.3%, still right at T2DM dx.  If tolerating metformin okay, recommended increasing it to 2x/day w/ meals.  How is the place on his R upper arm? If it is still looking very red, inflamed, then I want to put him on doxycycline 100 mg 2x/day x10 days.  I guess I overlooked PSA being due - is it too late to add it on?

## 2023-11-21 ENCOUNTER — ANESTHESIA (OUTPATIENT)
Dept: SURGERY | Facility: HOSPITAL | Age: 73
DRG: 581 | End: 2023-11-21
Payer: MEDICARE

## 2023-11-21 ENCOUNTER — ANESTHESIA EVENT (OUTPATIENT)
Dept: SURGERY | Facility: HOSPITAL | Age: 73
DRG: 581 | End: 2023-11-21
Payer: MEDICARE

## 2023-11-21 ENCOUNTER — HOSPITAL ENCOUNTER (INPATIENT)
Facility: HOSPITAL | Age: 73
LOS: 1 days | Discharge: HOME | DRG: 581 | End: 2023-11-22
Attending: EMERGENCY MEDICINE | Admitting: SURGERY
Payer: MEDICARE

## 2023-11-21 DIAGNOSIS — R73.03 PREDIABETES: ICD-10-CM

## 2023-11-21 DIAGNOSIS — S55.102A INJURY OF LEFT RADIAL ARTERY, INITIAL ENCOUNTER: ICD-10-CM

## 2023-11-21 DIAGNOSIS — S61.512A LACERATION OF LEFT WRIST, INITIAL ENCOUNTER: Primary | ICD-10-CM

## 2023-11-21 DIAGNOSIS — S61.519A LACERATION OF WRIST: ICD-10-CM

## 2023-11-21 DIAGNOSIS — S61.512A LACERATION OF LEFT WRIST: ICD-10-CM

## 2023-11-21 LAB
ANION GAP SERPL CALCULATED.3IONS-SCNC: 12 MMOL/L (ref 7–16)
BASOPHILS # BLD AUTO: 0.08 K/UL (ref 0–0.2)
BASOPHILS NFR BLD AUTO: 0.7 % (ref 0–1)
BUN SERPL-MCNC: 18 MG/DL (ref 7–18)
BUN/CREAT SERPL: 20 (ref 6–20)
CALCIUM SERPL-MCNC: 8.7 MG/DL (ref 8.5–10.1)
CHLORIDE SERPL-SCNC: 103 MMOL/L (ref 98–107)
CO2 SERPL-SCNC: 27 MMOL/L (ref 21–32)
CREAT SERPL-MCNC: 0.92 MG/DL (ref 0.7–1.3)
DIFFERENTIAL METHOD BLD: ABNORMAL
EGFR (NO RACE VARIABLE) (RUSH/TITUS): 88 ML/MIN/1.73M2
EOSINOPHIL # BLD AUTO: 1.08 K/UL (ref 0–0.5)
EOSINOPHIL NFR BLD AUTO: 9 % (ref 1–4)
ERYTHROCYTE [DISTWIDTH] IN BLOOD BY AUTOMATED COUNT: 14 % (ref 11.5–14.5)
GLUCOSE SERPL-MCNC: 106 MG/DL (ref 70–105)
GLUCOSE SERPL-MCNC: 98 MG/DL (ref 74–106)
HCT VFR BLD AUTO: 41.5 % (ref 40–54)
HGB BLD-MCNC: 14 G/DL (ref 13.5–18)
IMM GRANULOCYTES # BLD AUTO: 0.05 K/UL (ref 0–0.04)
IMM GRANULOCYTES NFR BLD: 0.4 % (ref 0–0.4)
INDIRECT COOMBS: NORMAL
LYMPHOCYTES # BLD AUTO: 4.02 K/UL (ref 1–4.8)
LYMPHOCYTES NFR BLD AUTO: 33.6 % (ref 27–41)
MCH RBC QN AUTO: 30.4 PG (ref 27–31)
MCHC RBC AUTO-ENTMCNC: 33.7 G/DL (ref 32–36)
MCV RBC AUTO: 90.2 FL (ref 80–96)
MONOCYTES # BLD AUTO: 1.05 K/UL (ref 0–0.8)
MONOCYTES NFR BLD AUTO: 8.8 % (ref 2–6)
MPC BLD CALC-MCNC: 10.7 FL (ref 9.4–12.4)
NEUTROPHILS # BLD AUTO: 5.68 K/UL (ref 1.8–7.7)
NEUTROPHILS NFR BLD AUTO: 47.5 % (ref 53–65)
NRBC # BLD AUTO: 0 X10E3/UL
NRBC, AUTO (.00): 0 %
PLATELET # BLD AUTO: 237 K/UL (ref 150–400)
POTASSIUM SERPL-SCNC: 3.9 MMOL/L (ref 3.5–5.1)
RBC # BLD AUTO: 4.6 M/UL (ref 4.6–6.2)
RH BLD: NORMAL
SODIUM SERPL-SCNC: 138 MMOL/L (ref 136–145)
SPECIMEN OUTDATE: NORMAL
WBC # BLD AUTO: 11.96 K/UL (ref 4.5–11)

## 2023-11-21 PROCEDURE — 25000003 PHARM REV CODE 250: Performed by: SURGERY

## 2023-11-21 PROCEDURE — 63600175 PHARM REV CODE 636 W HCPCS: Performed by: SURGERY

## 2023-11-21 PROCEDURE — 90715 TDAP VACCINE 7 YRS/> IM: CPT | Performed by: EMERGENCY MEDICINE

## 2023-11-21 PROCEDURE — 27000655: Performed by: ANESTHESIOLOGY

## 2023-11-21 PROCEDURE — 96365 THER/PROPH/DIAG IV INF INIT: CPT

## 2023-11-21 PROCEDURE — 27000190 HC CPAP FULL FACE MASK W/VALVE

## 2023-11-21 PROCEDURE — 82962 GLUCOSE BLOOD TEST: CPT

## 2023-11-21 PROCEDURE — 99285 EMERGENCY DEPT VISIT HI MDM: CPT | Mod: 25

## 2023-11-21 PROCEDURE — 94761 N-INVAS EAR/PLS OXIMETRY MLT: CPT

## 2023-11-21 PROCEDURE — 25260 REPAIR FOREARM TENDON/MUSCLE: CPT | Mod: 51,,, | Performed by: SURGERY

## 2023-11-21 PROCEDURE — D9220A PRA ANESTHESIA: Mod: ANES,,, | Performed by: ANESTHESIOLOGY

## 2023-11-21 PROCEDURE — 71000033 HC RECOVERY, INTIAL HOUR: Performed by: SURGERY

## 2023-11-21 PROCEDURE — 64857 PR REPAIR MAJOR PERIPHERAL NERVE: ICD-10-PCS | Mod: ,,, | Performed by: SURGERY

## 2023-11-21 PROCEDURE — 63600175 PHARM REV CODE 636 W HCPCS: Performed by: NURSE ANESTHETIST, CERTIFIED REGISTERED

## 2023-11-21 PROCEDURE — 99285 PR EMERGENCY DEPT VISIT,LEVEL V: ICD-10-PCS | Mod: ,,, | Performed by: EMERGENCY MEDICINE

## 2023-11-21 PROCEDURE — 37000008 HC ANESTHESIA 1ST 15 MINUTES: Performed by: SURGERY

## 2023-11-21 PROCEDURE — D9220A PRA ANESTHESIA: ICD-10-PCS | Mod: CRNA,,, | Performed by: NURSE ANESTHETIST, CERTIFIED REGISTERED

## 2023-11-21 PROCEDURE — 36000708 HC OR TIME LEV III 1ST 15 MIN: Performed by: SURGERY

## 2023-11-21 PROCEDURE — 96372 THER/PROPH/DIAG INJ SC/IM: CPT

## 2023-11-21 PROCEDURE — 25000003 PHARM REV CODE 250: Performed by: NURSE ANESTHETIST, CERTIFIED REGISTERED

## 2023-11-21 PROCEDURE — 99285 EMERGENCY DEPT VISIT HI MDM: CPT | Mod: ,,, | Performed by: EMERGENCY MEDICINE

## 2023-11-21 PROCEDURE — 64857 PR REPAIR MAJOR PERIPHERAL NERVE: ICD-10-PCS | Mod: 80,,, | Performed by: SURGERY

## 2023-11-21 PROCEDURE — 27000221 HC OXYGEN, UP TO 24 HOURS

## 2023-11-21 PROCEDURE — G0390 TRAUMA RESPONS W/HOSP CRITI: HCPCS

## 2023-11-21 PROCEDURE — D9220A PRA ANESTHESIA: ICD-10-PCS | Mod: ANES,,, | Performed by: ANESTHESIOLOGY

## 2023-11-21 PROCEDURE — 64857 REPAIR ARM/LEG NERVE: CPT | Mod: 80,,, | Performed by: SURGERY

## 2023-11-21 PROCEDURE — 99900035 HC TECH TIME PER 15 MIN (STAT)

## 2023-11-21 PROCEDURE — 25260 PR REFOREARM TEND/MUSC,FLEX,PRIM,EA: ICD-10-PCS | Mod: 51,,, | Performed by: SURGERY

## 2023-11-21 PROCEDURE — 63600175 PHARM REV CODE 636 W HCPCS: Performed by: EMERGENCY MEDICINE

## 2023-11-21 PROCEDURE — 90471 IMMUNIZATION ADMIN: CPT | Performed by: EMERGENCY MEDICINE

## 2023-11-21 PROCEDURE — 64857 REPAIR ARM/LEG NERVE: CPT | Mod: ,,, | Performed by: SURGERY

## 2023-11-21 PROCEDURE — D9220A PRA ANESTHESIA: Mod: CRNA,,, | Performed by: NURSE ANESTHETIST, CERTIFIED REGISTERED

## 2023-11-21 PROCEDURE — 99900031 HC PATIENT EDUCATION (STAT)

## 2023-11-21 PROCEDURE — 86901 BLOOD TYPING SEROLOGIC RH(D): CPT | Performed by: EMERGENCY MEDICINE

## 2023-11-21 PROCEDURE — 85025 COMPLETE CBC W/AUTO DIFF WBC: CPT | Performed by: EMERGENCY MEDICINE

## 2023-11-21 PROCEDURE — 63600175 PHARM REV CODE 636 W HCPCS: Performed by: ANESTHESIOLOGY

## 2023-11-21 PROCEDURE — 27000716 HC OXISENSOR PROBE, ANY SIZE: Performed by: ANESTHESIOLOGY

## 2023-11-21 PROCEDURE — 37000009 HC ANESTHESIA EA ADD 15 MINS: Performed by: SURGERY

## 2023-11-21 PROCEDURE — 27000689 HC BLADE LARYNGOSCOPE ANY SIZE: Performed by: ANESTHESIOLOGY

## 2023-11-21 PROCEDURE — 94660 CPAP INITIATION&MGMT: CPT

## 2023-11-21 PROCEDURE — 99223 PR INITIAL HOSPITAL CARE,LEVL III: ICD-10-PCS | Mod: AI,57,, | Performed by: SURGERY

## 2023-11-21 PROCEDURE — 25000003 PHARM REV CODE 250: Performed by: ANESTHESIOLOGY

## 2023-11-21 PROCEDURE — 99223 1ST HOSP IP/OBS HIGH 75: CPT | Mod: AI,57,, | Performed by: SURGERY

## 2023-11-21 PROCEDURE — 80048 BASIC METABOLIC PNL TOTAL CA: CPT | Performed by: EMERGENCY MEDICINE

## 2023-11-21 PROCEDURE — 36000709 HC OR TIME LEV III EA ADD 15 MIN: Performed by: SURGERY

## 2023-11-21 PROCEDURE — 27000165 HC TUBE, ETT CUFFED: Performed by: ANESTHESIOLOGY

## 2023-11-21 PROCEDURE — 25000003 PHARM REV CODE 250: Performed by: EMERGENCY MEDICINE

## 2023-11-21 PROCEDURE — 27000510 HC BLANKET BAIR HUGGER ANY SIZE: Performed by: ANESTHESIOLOGY

## 2023-11-21 PROCEDURE — 96375 TX/PRO/DX INJ NEW DRUG ADDON: CPT

## 2023-11-21 PROCEDURE — 11000001 HC ACUTE MED/SURG PRIVATE ROOM

## 2023-11-21 PROCEDURE — 27000284 HC CANNULA NASAL: Performed by: ANESTHESIOLOGY

## 2023-11-21 RX ORDER — SODIUM CHLORIDE 9 MG/ML
INJECTION, SOLUTION INTRAVENOUS
Status: COMPLETED
Start: 2023-11-21 | End: 2023-11-21

## 2023-11-21 RX ORDER — METFORMIN HYDROCHLORIDE 500 MG/1
500 TABLET, EXTENDED RELEASE ORAL 2 TIMES DAILY WITH MEALS
Status: DISCONTINUED | OUTPATIENT
Start: 2023-11-21 | End: 2023-11-22 | Stop reason: HOSPADM

## 2023-11-21 RX ORDER — PRAMIPEXOLE DIHYDROCHLORIDE 0.25 MG/1
0.25 TABLET ORAL NIGHTLY
Status: DISCONTINUED | OUTPATIENT
Start: 2023-11-21 | End: 2023-11-22 | Stop reason: HOSPADM

## 2023-11-21 RX ORDER — HYDROCODONE BITARTRATE AND ACETAMINOPHEN 10; 325 MG/1; MG/1
1 TABLET ORAL 3 TIMES DAILY PRN
Status: DISCONTINUED | OUTPATIENT
Start: 2023-11-21 | End: 2023-11-22 | Stop reason: HOSPADM

## 2023-11-21 RX ORDER — SODIUM CHLORIDE 450 MG/100ML
INJECTION, SOLUTION INTRAVENOUS CONTINUOUS
Status: DISCONTINUED | OUTPATIENT
Start: 2023-11-21 | End: 2023-11-22 | Stop reason: HOSPADM

## 2023-11-21 RX ORDER — IBUPROFEN 200 MG
16 TABLET ORAL
Status: DISCONTINUED | OUTPATIENT
Start: 2023-11-21 | End: 2023-11-22 | Stop reason: HOSPADM

## 2023-11-21 RX ORDER — ONDANSETRON 2 MG/ML
4 INJECTION INTRAMUSCULAR; INTRAVENOUS
Status: COMPLETED | OUTPATIENT
Start: 2023-11-21 | End: 2023-11-21

## 2023-11-21 RX ORDER — PHENYLEPHRINE HYDROCHLORIDE 10 MG/ML
INJECTION INTRAVENOUS
Status: DISCONTINUED | OUTPATIENT
Start: 2023-11-21 | End: 2023-11-21

## 2023-11-21 RX ORDER — IBUPROFEN 200 MG
24 TABLET ORAL
Status: DISCONTINUED | OUTPATIENT
Start: 2023-11-21 | End: 2023-11-22 | Stop reason: HOSPADM

## 2023-11-21 RX ORDER — CETIRIZINE HYDROCHLORIDE 10 MG/1
10 TABLET ORAL DAILY
Status: DISCONTINUED | OUTPATIENT
Start: 2023-11-22 | End: 2023-11-22 | Stop reason: HOSPADM

## 2023-11-21 RX ORDER — MEPERIDINE HYDROCHLORIDE 25 MG/ML
25 INJECTION INTRAMUSCULAR; INTRAVENOUS; SUBCUTANEOUS EVERY 10 MIN PRN
Status: DISCONTINUED | OUTPATIENT
Start: 2023-11-21 | End: 2023-11-21 | Stop reason: HOSPADM

## 2023-11-21 RX ORDER — LISINOPRIL 40 MG/1
40 TABLET ORAL DAILY
Status: DISCONTINUED | OUTPATIENT
Start: 2023-11-22 | End: 2023-11-22 | Stop reason: HOSPADM

## 2023-11-21 RX ORDER — GLUCAGON 1 MG
1 KIT INJECTION
Status: DISCONTINUED | OUTPATIENT
Start: 2023-11-21 | End: 2023-11-22 | Stop reason: HOSPADM

## 2023-11-21 RX ORDER — CYCLOBENZAPRINE HCL 10 MG
10 TABLET ORAL NIGHTLY PRN
Status: DISCONTINUED | OUTPATIENT
Start: 2023-11-21 | End: 2023-11-22 | Stop reason: HOSPADM

## 2023-11-21 RX ORDER — ASPIRIN 325 MG
325 TABLET, DELAYED RELEASE (ENTERIC COATED) ORAL DAILY
Status: DISCONTINUED | OUTPATIENT
Start: 2023-11-22 | End: 2023-11-22 | Stop reason: HOSPADM

## 2023-11-21 RX ORDER — EPHEDRINE SULFATE 50 MG/ML
INJECTION, SOLUTION INTRAVENOUS
Status: DISCONTINUED | OUTPATIENT
Start: 2023-11-21 | End: 2023-11-21

## 2023-11-21 RX ORDER — DIPHENHYDRAMINE HYDROCHLORIDE 50 MG/ML
25 INJECTION INTRAMUSCULAR; INTRAVENOUS EVERY 6 HOURS PRN
Status: DISCONTINUED | OUTPATIENT
Start: 2023-11-21 | End: 2023-11-21 | Stop reason: HOSPADM

## 2023-11-21 RX ORDER — FENTANYL CITRATE 50 UG/ML
INJECTION, SOLUTION INTRAMUSCULAR; INTRAVENOUS
Status: DISCONTINUED | OUTPATIENT
Start: 2023-11-21 | End: 2023-11-21

## 2023-11-21 RX ORDER — ONDANSETRON 2 MG/ML
4 INJECTION INTRAMUSCULAR; INTRAVENOUS DAILY PRN
Status: DISCONTINUED | OUTPATIENT
Start: 2023-11-21 | End: 2023-11-21 | Stop reason: HOSPADM

## 2023-11-21 RX ORDER — ATORVASTATIN CALCIUM 80 MG/1
80 TABLET, FILM COATED ORAL DAILY
Status: DISCONTINUED | OUTPATIENT
Start: 2023-11-22 | End: 2023-11-22 | Stop reason: HOSPADM

## 2023-11-21 RX ORDER — HYDROMORPHONE HYDROCHLORIDE 2 MG/ML
0.5 INJECTION, SOLUTION INTRAMUSCULAR; INTRAVENOUS; SUBCUTANEOUS EVERY 5 MIN PRN
Status: DISCONTINUED | OUTPATIENT
Start: 2023-11-21 | End: 2023-11-21 | Stop reason: HOSPADM

## 2023-11-21 RX ORDER — MIDAZOLAM HYDROCHLORIDE 1 MG/ML
INJECTION INTRAMUSCULAR; INTRAVENOUS
Status: DISCONTINUED | OUTPATIENT
Start: 2023-11-21 | End: 2023-11-21

## 2023-11-21 RX ORDER — HEPARIN SODIUM 1000 [USP'U]/ML
INJECTION, SOLUTION INTRAVENOUS; SUBCUTANEOUS
Status: DISCONTINUED | OUTPATIENT
Start: 2023-11-21 | End: 2023-11-21 | Stop reason: HOSPADM

## 2023-11-21 RX ORDER — TAMSULOSIN HYDROCHLORIDE 0.4 MG/1
0.4 CAPSULE ORAL DAILY
Status: DISCONTINUED | OUTPATIENT
Start: 2023-11-22 | End: 2023-11-22 | Stop reason: HOSPADM

## 2023-11-21 RX ORDER — LIDOCAINE HYDROCHLORIDE 20 MG/ML
INJECTION, SOLUTION EPIDURAL; INFILTRATION; INTRACAUDAL; PERINEURAL
Status: DISCONTINUED | OUTPATIENT
Start: 2023-11-21 | End: 2023-11-21

## 2023-11-21 RX ORDER — GLYCOPYRROLATE 0.2 MG/ML
INJECTION INTRAMUSCULAR; INTRAVENOUS
Status: DISCONTINUED | OUTPATIENT
Start: 2023-11-21 | End: 2023-11-21

## 2023-11-21 RX ORDER — SUCCINYLCHOLINE CHLORIDE 20 MG/ML
INJECTION INTRAMUSCULAR; INTRAVENOUS
Status: DISCONTINUED | OUTPATIENT
Start: 2023-11-21 | End: 2023-11-21

## 2023-11-21 RX ORDER — MORPHINE SULFATE 10 MG/ML
4 INJECTION INTRAMUSCULAR; INTRAVENOUS; SUBCUTANEOUS EVERY 5 MIN PRN
Status: DISCONTINUED | OUTPATIENT
Start: 2023-11-21 | End: 2023-11-21 | Stop reason: HOSPADM

## 2023-11-21 RX ORDER — DULOXETIN HYDROCHLORIDE 30 MG/1
60 CAPSULE, DELAYED RELEASE ORAL 2 TIMES DAILY
Status: DISCONTINUED | OUTPATIENT
Start: 2023-11-21 | End: 2023-11-22 | Stop reason: HOSPADM

## 2023-11-21 RX ORDER — INSULIN ASPART 100 [IU]/ML
0-10 INJECTION, SOLUTION INTRAVENOUS; SUBCUTANEOUS
Status: DISCONTINUED | OUTPATIENT
Start: 2023-11-21 | End: 2023-11-22 | Stop reason: HOSPADM

## 2023-11-21 RX ORDER — MORPHINE SULFATE 4 MG/ML
4 INJECTION, SOLUTION INTRAMUSCULAR; INTRAVENOUS EVERY 4 HOURS PRN
Status: DISCONTINUED | OUTPATIENT
Start: 2023-11-21 | End: 2023-11-22 | Stop reason: HOSPADM

## 2023-11-21 RX ORDER — ALBUTEROL SULFATE 90 UG/1
2 AEROSOL, METERED RESPIRATORY (INHALATION) EVERY 4 HOURS PRN
Status: DISCONTINUED | OUTPATIENT
Start: 2023-11-21 | End: 2023-11-21

## 2023-11-21 RX ORDER — METFORMIN HYDROCHLORIDE 500 MG/1
500 TABLET, EXTENDED RELEASE ORAL 2 TIMES DAILY WITH MEALS
Qty: 180 TABLET | Refills: 3 | Status: SHIPPED | OUTPATIENT
Start: 2023-11-21 | End: 2023-11-28 | Stop reason: DRUGHIGH

## 2023-11-21 RX ORDER — HYDROMORPHONE HYDROCHLORIDE 2 MG/ML
1 INJECTION, SOLUTION INTRAMUSCULAR; INTRAVENOUS; SUBCUTANEOUS
Status: COMPLETED | OUTPATIENT
Start: 2023-11-21 | End: 2023-11-21

## 2023-11-21 RX ORDER — ROCURONIUM BROMIDE 10 MG/ML
INJECTION, SOLUTION INTRAVENOUS
Status: DISCONTINUED | OUTPATIENT
Start: 2023-11-21 | End: 2023-11-21

## 2023-11-21 RX ORDER — ALBUTEROL SULFATE 0.83 MG/ML
2.5 SOLUTION RESPIRATORY (INHALATION) EVERY 4 HOURS PRN
Status: DISCONTINUED | OUTPATIENT
Start: 2023-11-21 | End: 2023-11-22 | Stop reason: HOSPADM

## 2023-11-21 RX ORDER — PROPOFOL 10 MG/ML
VIAL (ML) INTRAVENOUS
Status: DISCONTINUED | OUTPATIENT
Start: 2023-11-21 | End: 2023-11-21

## 2023-11-21 RX ADMIN — HYDROMORPHONE HYDROCHLORIDE 1 MG: 2 INJECTION INTRAMUSCULAR; INTRAVENOUS; SUBCUTANEOUS at 05:11

## 2023-11-21 RX ADMIN — PHENYLEPHRINE HYDROCHLORIDE 150 MCG: 10 INJECTION INTRAVENOUS at 07:11

## 2023-11-21 RX ADMIN — METFORMIN HYDROCHLORIDE 500 MG: 500 TABLET, EXTENDED RELEASE ORAL at 10:11

## 2023-11-21 RX ADMIN — FENTANYL CITRATE 100 MCG: 50 INJECTION INTRAMUSCULAR; INTRAVENOUS at 06:11

## 2023-11-21 RX ADMIN — SODIUM CHLORIDE 1000 ML: 9 INJECTION, SOLUTION INTRAVENOUS at 05:11

## 2023-11-21 RX ADMIN — PHENYLEPHRINE HYDROCHLORIDE 100 MCG: 10 INJECTION INTRAVENOUS at 07:11

## 2023-11-21 RX ADMIN — ROCURONIUM BROMIDE 5 MG: 10 INJECTION, SOLUTION INTRAVENOUS at 06:11

## 2023-11-21 RX ADMIN — EPHEDRINE SULFATE 10 MG: 50 INJECTION INTRAVENOUS at 07:11

## 2023-11-21 RX ADMIN — TETANUS TOXOID, REDUCED DIPHTHERIA TOXOID AND ACELLULAR PERTUSSIS VACCINE, ADSORBED 0.5 ML: 5; 2.5; 8; 8; 2.5 SUSPENSION INTRAMUSCULAR at 05:11

## 2023-11-21 RX ADMIN — GLYCOPYRROLATE 0.2 MG: 0.2 INJECTION INTRAMUSCULAR; INTRAVENOUS at 07:11

## 2023-11-21 RX ADMIN — LIDOCAINE HYDROCHLORIDE 100 MG: 20 INJECTION, SOLUTION INTRAVENOUS at 06:11

## 2023-11-21 RX ADMIN — PROPOFOL 150 MG: 10 INJECTION, EMULSION INTRAVENOUS at 06:11

## 2023-11-21 RX ADMIN — HYDROCODONE BITARTRATE AND ACETAMINOPHEN 1 TABLET: 10; 325 TABLET ORAL at 10:11

## 2023-11-21 RX ADMIN — MIDAZOLAM 2 MG: 1 INJECTION INTRAMUSCULAR; INTRAVENOUS at 06:11

## 2023-11-21 RX ADMIN — SUCCINYLCHOLINE CHLORIDE 130 MG: 20 INJECTION, SOLUTION INTRAMUSCULAR; INTRAVENOUS at 06:11

## 2023-11-21 RX ADMIN — SODIUM CHLORIDE: 9 INJECTION, SOLUTION INTRAVENOUS at 06:11

## 2023-11-21 RX ADMIN — SODIUM CHLORIDE: 4.5 INJECTION, SOLUTION INTRAVENOUS at 10:11

## 2023-11-21 RX ADMIN — CEFAZOLIN 2 G: 2 INJECTION, POWDER, FOR SOLUTION INTRAMUSCULAR; INTRAVENOUS at 05:11

## 2023-11-21 RX ADMIN — SUGAMMADEX 200 MG: 100 INJECTION, SOLUTION INTRAVENOUS at 07:11

## 2023-11-21 RX ADMIN — PRAMIPEXOLE DIHYDROCHLORIDE 0.25 MG: 0.25 TABLET ORAL at 10:11

## 2023-11-21 RX ADMIN — EPHEDRINE SULFATE 30 MG: 50 INJECTION INTRAVENOUS at 07:11

## 2023-11-21 RX ADMIN — ONDANSETRON 4 MG: 2 INJECTION INTRAMUSCULAR; INTRAVENOUS at 05:11

## 2023-11-21 RX ADMIN — ROCURONIUM BROMIDE 35 MG: 10 INJECTION, SOLUTION INTRAVENOUS at 06:11

## 2023-11-21 NOTE — PROGRESS NOTES
Patient notified of lab results.  Will increase Metformin to twice a day.  Too late to add on PSA.  Spot on the upper arm is getting better.
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
## 2023-11-21 NOTE — SUBJECTIVE & OBJECTIVE
No current facility-administered medications on file prior to encounter.     Current Outpatient Medications on File Prior to Encounter   Medication Sig    albuterol (VENTOLIN HFA) 90 mcg/actuation inhaler Inhale 2 puffs into the lungs every 4 (four) hours as needed for Wheezing or Shortness of Breath. Rescue (Patient not taking: Reported on 11/14/2023)    aspirin (ECOTRIN) 325 MG EC tablet Take 325 mg by mouth once daily.    cyclobenzaprine (FLEXERIL) 10 MG tablet Take 1 tablet (10 mg total) by mouth nightly as needed for Muscle spasms.    diclofenac sodium (VOLTAREN) 1 % Gel Apply to back four times daily as needed for pain    DULoxetine (CYMBALTA) 60 MG capsule Take 1 capsule (60 mg total) by mouth 2 (two) times daily.    gabapentin (NEURONTIN) 600 MG tablet Take 2 tablets (1,200 mg total) by mouth 2 (two) times daily.    HYDROcodone-acetaminophen (NORCO)  mg per tablet Take 1 tablet by mouth 3 (three) times daily as needed for Pain.    hydrocortisone 2.5 % cream Apply topically 2 (two) times daily.    lisinopriL (PRINIVIL,ZESTRIL) 40 MG tablet Take 1 tablet (40 mg total) by mouth once daily.    loratadine (CLARITIN) 10 mg tablet Take 1 tablet (10 mg total) by mouth once daily.    metFORMIN (GLUCOPHAGE-XR) 500 MG ER 24hr tablet Take 1 tablet (500 mg total) by mouth 2 (two) times daily with meals.    mupirocin (BACTROBAN) 2 % ointment Apply topically 3 (three) times daily.    NARCAN 4 mg/actuation Spry 1 spray by Nasal route daily as needed.    pramipexole (MIRAPEX) 0.25 MG tablet Take 1 tablet (0.25 mg total) by mouth every evening.    rosuvastatin (CRESTOR) 20 MG tablet Take 1 tablet (20 mg total) by mouth once daily.    tamsulosin (FLOMAX) 0.4 mg Cap Take 1 capsule (0.4 mg total) by mouth once daily.    traZODone (DESYREL) 50 MG tablet Take 4 tablets (200 mg total) by mouth every evening.    [DISCONTINUED] metFORMIN (GLUCOPHAGE-XR) 500 MG ER 24hr tablet Take 1 tablet (500 mg total) by mouth daily with  dinner or evening meal.       Review of patient's allergies indicates:  No Known Allergies    Past Medical History:   Diagnosis Date    Anxiety disorder, unspecified     Chronic bilateral low back pain with left-sided sciatica 2022    Chronic rhinitis 2022    Depressive disorder     Essential (primary) hypertension     GERD (gastroesophageal reflux disease)     History of colon polyps 2022    Hyperlipidemia     RAYMUNDO on CPAP     Prediabetes 2023    Lab Results Component Value Date  HGBA1C 6.3 2023      RLS (restless legs syndrome)     TIA (transient ischemic attack)      Past Surgical History:   Procedure Laterality Date    BACK SURGERY      Caudal HARPER  2013    Dr Rdz    FOOT SURGERY Left     Left l4-L5 TFESI Left 2012    Dr Rdz    MOLE REMOVAL      SHOULDER ARTHROSCOPY      T3-T4 HARPER  2012    Dr Rdz    TONSILLECTOMY      UVULECTOMY       Family History       Problem Relation (Age of Onset)    Arthritis Sister    Heart attack Father    Heart disease Mother, Father    Hyperlipidemia Son    Hypertension Son    No Known Problems Sister, Brother, Brother, Maternal Grandmother, Maternal Grandfather, Paternal Grandmother, Paternal Grandfather    Rheumatic fever Mother          Tobacco Use    Smoking status: Former     Current packs/day: 0.00     Average packs/day: 1 pack/day for 10.0 years (10.0 ttl pk-yrs)     Types: Cigarettes     Start date:      Quit date:      Years since quittin.9    Smokeless tobacco: Never   Substance and Sexual Activity    Alcohol use: Yes     Comment: Occasionally    Drug use: Never    Sexual activity: Yes     Review of Systems  Objective:     Vital Signs (Most Recent):  Temp: 98.5 °F (36.9 °C) (23 1651)  Pulse: 69 (23 1721)  Resp: 19 (23 1728)  BP: (!) 185/90 (23 172)  SpO2: (!) 89 % (23 172) Vital Signs (24h Range):  Temp:  [98.5 °F (36.9 °C)] 98.5 °F (36.9 °C)  Pulse:  [69-73] 69  Resp:   [11-19] 19  SpO2:  [89 %-96 %] 89 %  BP: (179-213)/() 185/90        There is no height or weight on file to calculate BMI.     Physical Exam  Constitutional:       Appearance: Normal appearance.   Cardiovascular:      Rate and Rhythm: Normal rate.      Comments: Laceration along course of left radial artery at wrist  Pulmonary:      Effort: Pulmonary effort is normal.   Abdominal:      General: Abdomen is flat. Bowel sounds are normal.      Palpations: Abdomen is soft.   Musculoskeletal:         General: Normal range of motion.      Cervical back: Normal range of motion.   Neurological:      General: No focal deficit present.      Mental Status: He is alert.      Sensory: Sensory deficit present.   Psychiatric:         Mood and Affect: Mood normal.         Behavior: Behavior normal.         Thought Content: Thought content normal.         Judgment: Judgment normal.            I have reviewed all pertinent lab results within the past 24 hours.  CBC:   Recent Labs   Lab 11/21/23  1656   WBC 11.96*   RBC 4.60   HGB 14.0   HCT 41.5      MCV 90.2   MCH 30.4   MCHC 33.7     BMP:   Recent Labs   Lab 11/21/23  1656   GLU 98      K 3.9      CO2 27   BUN 18   CREATININE 0.92   CALCIUM 8.7       Significant Diagnostics:  I have reviewed all pertinent imaging results/findings within the past 24 hours.

## 2023-11-21 NOTE — ED PROVIDER NOTES
Encounter Date: 11/21/2023    SCRIBE #1 NOTE: I, Lashawn Robison, am scribing for, and in the presence of,  Abdelrahman Villagomez MD. I have scribed the entire note.       History     Chief Complaint   Patient presents with    Laceration     72 year old male presents to the ED via EMS complaining of a laceration. Patient reports that he cut his left wrist on saw earlier today. Patient reports taking aspirin but no blood thinners. Patient has a PMHx of GERD, TIA, hyperlipidemia, and essential hypertension. Upon physical examination patient has an approximate 6 cm laceration to the left wrist.     The history is provided by the patient. No  was used.     Review of patient's allergies indicates:  No Known Allergies  Past Medical History:   Diagnosis Date    Anxiety disorder, unspecified     Chronic bilateral low back pain with left-sided sciatica 04/12/2022    Chronic rhinitis 04/12/2022    Depressive disorder     Essential (primary) hypertension     GERD (gastroesophageal reflux disease)     History of colon polyps 04/12/2022    Hyperlipidemia     Laceration of left wrist 11/21/2023    RAYMUNDO on CPAP     Prediabetes 04/19/2023    Lab Results Component Value Date  HGBA1C 6.3 04/19/2023      RLS (restless legs syndrome)     TIA (transient ischemic attack)      Past Surgical History:   Procedure Laterality Date    BACK SURGERY      Caudal HARPER  02/18/2013    Dr Rdz    FOOT SURGERY Left     Left l4-L5 TFESI Left 06/20/2012    Dr Rdz    MOLE REMOVAL      SHOULDER ARTHROSCOPY      T3-T4 HARPER  02/27/2012    Dr Rdz    TONSILLECTOMY      UVULECTOMY       Family History   Problem Relation Age of Onset    Heart disease Mother     Rheumatic fever Mother     Heart disease Father     Heart attack Father     No Known Problems Sister     Arthritis Sister     No Known Problems Brother     No Known Problems Brother     Hyperlipidemia Son     Hypertension Son     No Known Problems Maternal Grandmother     No Known  Problems Maternal Grandfather     No Known Problems Paternal Grandmother     No Known Problems Paternal Grandfather      Social History     Tobacco Use    Smoking status: Former     Current packs/day: 0.00     Average packs/day: 1 pack/day for 10.0 years (10.0 ttl pk-yrs)     Types: Cigarettes     Start date:      Quit date:      Years since quittin.9    Smokeless tobacco: Never   Substance Use Topics    Alcohol use: Yes     Comment: Occasionally    Drug use: Never     Review of Systems   Skin:         Laceration to left wrist       Physical Exam     Initial Vitals [23 1651]   BP Pulse Resp Temp SpO2   (!) 213/110 73 19 98.5 °F (36.9 °C) (!) 92 %      MAP       --         Physical Exam    Nursing note and vitals reviewed.  Constitutional: He appears well-developed and well-nourished.   Pulmonary/Chest: Breath sounds normal. No respiratory distress.     Neurological: He is alert and oriented to person, place, and time.   Skin:   6 cm laceration to left wrist         ED Course   Procedures  Labs Reviewed   CBC WITH DIFFERENTIAL - Abnormal; Notable for the following components:       Result Value    WBC 11.96 (*)     Neutrophils % 47.5 (*)     Monocytes % 8.8 (*)     Eosinophils % 9.0 (*)     Monocytes, Absolute 1.05 (*)     Eosinophils, Absolute 1.08 (*)     Immature Granulocytes, Absolute 0.05 (*)     All other components within normal limits   BASIC METABOLIC PANEL - Normal   CBC W/ AUTO DIFFERENTIAL    Narrative:     The following orders were created for panel order CBC auto differential.  Procedure                               Abnormality         Status                     ---------                               -----------         ------                     CBC with Differential[[id number]]       Abnormal            Final result                 Please view results for these tests on the individual orders.   TYPE & SCREEN          Imaging Results              X-Ray Wrist Complete Left (In  process)                      Medications   sodium chloride 0.9% bolus 1,000 mL 1,000 mL (1,000 mLs Intravenous New Bag 11/21/23 1745)   sodium chloride 0.9% infusion (has no administration in time range)   ceFAZolin 2 g in dextrose 5 % in water (D5W) 50 mL IVPB (MB+) (2 g Intravenous New Bag 11/21/23 1749)   Tdap (BOOSTRIX) vaccine injection 0.5 mL (0.5 mLs Intramuscular Given 11/21/23 1729)   HYDROmorphone (PF) injection 1 mg (1 mg Intravenous Given 11/21/23 1728)   ondansetron injection 4 mg (4 mg Intravenous Given 11/21/23 1729)     Medical Decision Making  Amount and/or Complexity of Data Reviewed  Labs: ordered.  Radiology: ordered.    Risk  Prescription drug management.              Attending Attestation:           Physician Attestation for Scribe:  Physician Attestation Statement for Scribe #1: I, Abdelrahman Villagomez MD, reviewed documentation, as scribed by Lashawn Robison in my presence, and it is both accurate and complete.             ED Course as of 11/21/23 1755 Tue Nov 21, 2023 1654 Medical decision-making:  Differential diagnosis includes left wrist laceration, radial artery injury, muscle injury, tendon injury, nerve injury.  I immediately discussed case with vascular surgeon, Dr. Clifton who states he is going to come see the patient.  We are going to leave tourniquet in place until then because patient was having active bleeding even with the tourniquet in place.  Pressure dressing has been applied over that which is controlling bleeding for now. [BB]   1700 Left wrist x-ray by my interpretation shows no acute fracture or foreign body. [BB]   1754 Patient was seen by surgeon, Dr. Clifton who is taking patient to the operating room. [BB]      ED Course User Index  [BB] Abdelrahman Villagomez MD                          Clinical Impression:  Final diagnoses:  [S61.519A] Laceration of wrist  [S55.102A] Injury of left radial artery, initial encounter          ED Disposition Condition    Observation Stable                 Abdelrahman Villagomez MD  11/21/23 5010

## 2023-11-21 NOTE — ED NOTES
Wife came to nurses station stating wound was bleeding again. Wound reinforced and pressure dressing reapplied. 2nd touniquet at bedside for use but not applied at this time, bleeding controlled with pressure.

## 2023-11-22 VITALS
WEIGHT: 249.81 LBS | DIASTOLIC BLOOD PRESSURE: 67 MMHG | HEART RATE: 71 BPM | RESPIRATION RATE: 18 BRPM | OXYGEN SATURATION: 97 % | BODY MASS INDEX: 35.76 KG/M2 | HEIGHT: 70 IN | SYSTOLIC BLOOD PRESSURE: 139 MMHG | TEMPERATURE: 98 F

## 2023-11-22 LAB
GLUCOSE SERPL-MCNC: 109 MG/DL (ref 70–105)
GLUCOSE SERPL-MCNC: 119 MG/DL (ref 70–105)

## 2023-11-22 PROCEDURE — 96372 THER/PROPH/DIAG INJ SC/IM: CPT

## 2023-11-22 PROCEDURE — 82962 GLUCOSE BLOOD TEST: CPT

## 2023-11-22 PROCEDURE — 25000003 PHARM REV CODE 250: Performed by: SURGERY

## 2023-11-22 PROCEDURE — 99900035 HC TECH TIME PER 15 MIN (STAT)

## 2023-11-22 RX ORDER — SODIUM CHLORIDE 9 MG/ML
INJECTION, SOLUTION INTRAVENOUS CONTINUOUS
Status: DISCONTINUED | OUTPATIENT
Start: 2023-11-22 | End: 2023-11-22 | Stop reason: HOSPADM

## 2023-11-22 RX ADMIN — ATORVASTATIN CALCIUM 80 MG: 80 TABLET, FILM COATED ORAL at 08:11

## 2023-11-22 RX ADMIN — TAMSULOSIN HYDROCHLORIDE 0.4 MG: 0.4 CAPSULE ORAL at 08:11

## 2023-11-22 RX ADMIN — LISINOPRIL 40 MG: 40 TABLET ORAL at 08:11

## 2023-11-22 RX ADMIN — CETIRIZINE HYDROCHLORIDE 10 MG: 10 TABLET, FILM COATED ORAL at 08:11

## 2023-11-22 RX ADMIN — HYDROCODONE BITARTRATE AND ACETAMINOPHEN 1 TABLET: 10; 325 TABLET ORAL at 04:11

## 2023-11-22 RX ADMIN — METFORMIN HYDROCHLORIDE 500 MG: 500 TABLET, EXTENDED RELEASE ORAL at 08:11

## 2023-11-22 RX ADMIN — ASPIRIN 325 MG: 325 TABLET, COATED ORAL at 08:11

## 2023-11-22 RX ADMIN — DULOXETINE HYDROCHLORIDE 60 MG: 30 CAPSULE, DELAYED RELEASE ORAL at 08:11

## 2023-11-22 NOTE — HOSPITAL COURSE
Patient admitted the hospital with the above laceration.  He had a transected tendon in addition to superficial branch of the radial nerve these were repaired  common radial artery was transected this was ligated    He is currently doing well without problems he will be discharged home with wrist splint appropriate wound care instructions were given     He has chronic pain medications we will not add any addition to that     Call for problems see him in 10 days paragraphs hands warm well vascularized

## 2023-11-22 NOTE — ANESTHESIA PREPROCEDURE EVALUATION
11/21/2023  Chano Clement is a 72 y.o., male.      Pre-op Assessment    I have reviewed the Patient Summary Reports.    I have reviewed the NPO Status.   I have reviewed the Medications.     Review of Systems         Anesthesia Plan  Type of Anesthesia, risks & benefits discussed:    Anesthesia Type: Gen Supraglottic Airway  Intra-op Monitoring Plan: Standard ASA Monitors  Post Op Pain Control Plan: IV/PO Opioids PRN  Induction:  IV  Informed Consent: Informed consent signed with the Patient and all parties understand the risks and agree with anesthesia plan.  All questions answered.   ASA Score: 3    Ready For Surgery From Anesthesia Perspective.     .  No known anesthetic complications  NKDA    V/S:  HR 66-73  //75  Sats 91-92%    Hct 42    Chronic bilateral low back pain with left-sided sciatica    Other Medical History   Essential (primary) hypertension Depressive disorder   Hyperlipidemia GERD (gastroesophageal reflux disease)   TIA (transient ischemic attack) RAYMUNDO on CPAP   RLS (restless legs syndrome) Anxiety disorder, unspecified   Chronic rhinitis History of colon polyps   Prediabetes    LUE laceration (volar wrist, from a power tool)     Airway exam deferred (COVID precautions); adequate ROM at neck.

## 2023-11-22 NOTE — OP NOTE
[facility]  Surgery Department  Operative Note    SUMMARY     Date of Procedure: 11/21/2023     Procedure: Procedure(s) (LRB):  REPAIR, SUPERFICIAL BRANCH OF RADIAL   NERVE (Left)  REPAIR BRACHIORADIALIS TENDON (Left)  REPAIR/LIGATION, ARTERY, RADIAL (Left)     Surgeon(s) and Role:     * Val Clifton MD - Primary     * Yassine Kc MD - Assisting     Dr. Kc was present for the tendon and nerve repair assisted    Pre-Operative Diagnosis: Laceration of left wrist, initial encounter [S61.512A]    Post-Operative Diagnosis: Post-Op Diagnosis Codes:     * Laceration of left wrist, initial encounter [S61.512A]    Anesthesia: General    Operative Findings (including complications, if any):  transected tendon probably brachial radialis extensor tendon of the thumb, transected nerve probably superficial branch of radial nerve common transected radial artery    Description of Technical Procedures:  taken operative suite left upper extremity prepped draped usual sterile fashion tourniquet was placed at the forearm excuse me the upper arm.  Next explored the transverse incision at the wrist on the volar aspect we are able identify radial artery is in spasm and had retracted significantly we are unable to approximate these edges to a degree that we could close it primarily he had excellent ulnar artery and palmar arch signals we elected ligated this.  We then identified transected tendon we are able to grasp the most proximal part flexor wrist and approximate these ends we performed tendon repair using 3-0 Vicryl in standard fashion.  Driving done this explored the wound identified nervous structure collateral aspect of the wrist just above the fascial level proximally 3 mm diameter.  We freed it up edges were freshened we have approximated the perineal area on with interrupted 6 0 Prolenes circumferentially copiously irrigated the wound we approximated deep tissues with interrupted 3-0 Vicryl the  skin with 3-0 nylons sterile dressing applied and wrist splint    Significant Surgical Tasks Conducted by the Assistant(s), if Applicable:  none    Estimated Blood Loss (EBL): 20 mL           Implants: * No implants in log *    Specimens:   Specimen (24h ago, onward)      None                    Condition: Good    Disposition: PACU - hemodynamically stable.    Attestation: I was present and scrubbed for the entire procedure.

## 2023-11-22 NOTE — PLAN OF CARE
[facility]  Discharge Final Note    Primary Care Provider: Calista Wright FNP    Expected Discharge Date: 11/22/2023    Final Discharge Note (most recent)       Final Note - 11/22/23 1253          Final Note    Assessment Type Final Discharge Note     Anticipated Discharge Disposition Home or Self Care     What phone number can be called within the next 1-3 days to see how you are doing after discharge? [phone]        Post-Acute Status    Discharge Delays None known at this time                     Important Message from Medicare  Important Message from Medicare regarding Discharge Appeal Rights: Given to patient/caregiver, Explained to patient/caregiver, Signed/date by patient/caregiver     Date IMM was signed: 11/22/23  Time IMM was signed: 0916    Contact Info       Alessandra Gutierrez ACNP   Specialty: Vascular Surgery, Emergency Medicine    [address]  [facility]  [facility] [address]   Phone: [phone]       Next Steps: Follow up    Instructions: Please follow up December 4 at 2:15          Pt dc home with family on this day. 0 dc needs noted.

## 2023-11-22 NOTE — PLAN OF CARE
[facility]  Initial Discharge Assessment       Primary Care Provider: Calista Wright FNP    Admission Diagnosis: Laceration of wrist [S61.519A]  Laceration of left wrist, initial encounter [S61.512A]  Injury of left radial artery, initial encounter [S55.102A]    Admission Date: 11/21/2023  Expected Discharge Date: 11/22/2023    Transition of Care Barriers: None    Payor: MEDICARE / Plan: MEDICARE PART A & B / Product Type: Government /     Extended Emergency Contact Information  Primary Emergency Contact: SAFIA KELLY  Mobile Phone: [phone]  Relation: Spouse  Preferred language: English   needed? No    Discharge Plan A: Home with family  Discharge Plan B: Home with family      [facility] [place] [place] [facility] [address]  [address]  [address]  [place] [address]  Phone: [phone] Fax: [phone]    [id number] - [place], [place] - [address]  [address]  [place] [address]  Phone: [phone] Fax: [phone]    [facility] - [place], [place] - [address]  [address]  [place] [address]  Phone: [phone] Fax: [phone]      Initial Assessment (most recent)       Adult Discharge Assessment - 11/22/23 1008          Discharge Assessment    Assessment Type Discharge Planning Assessment     Source of Information patient     Communicated IVONNE with patient/caregiver Yes     Reason For Admission laceration of left wrist     People in Home spouse     Facility Arrived From: home     Do you expect to return to your current living situation? Yes     Do you have help at home or someone to help you manage your care at home? Yes     Who are your caregiver(s) and their phone number(s)? Safialaura Kelly (Spouse) [phone]     Prior to hospitilization cognitive status: Unable to Assess     Current cognitive status: Alert/Oriented     Home Accessibility not wheelchair accessible;stairs within home     Number  of Stairs, Within Home, Primary other (see comments)   14    Stair Railings, Within Home, Primary railings safe and in good condition     Home Layout Bedroom on 2nd floor     Equipment Currently Used at Home none     Readmission within 30 days? No     Patient currently being followed by outpatient case management? No     Do you currently have service(s) that help you manage your care at home? No     Do you take prescription medications? Yes     Do you have prescription coverage? Yes     Do you have any problems affording any of your prescribed medications? No     Is the patient taking medications as prescribed? yes     Who is going to help you get home at discharge? Safia Clement (Spouse) [phone]     How do you get to doctors appointments? car, drives self;family or friend will provide     Are you on dialysis? No     Do you take coumadin? No     DME Needed Upon Discharge  none     Discharge Plan discussed with: Patient     Transition of Care Barriers None     Discharge Plan A Home with family     Discharge Plan B Home with family        Physical Activity    On average, how many days per week do you engage in moderate to strenuous exercise (like a brisk walk)? 5 days     On average, how many minutes do you engage in exercise at this level? 40 min        Financial Resource Strain    How hard is it for you to pay for the very basics like food, housing, medical care, and heating? Not hard at all        Housing Stability    In the last 12 months, was there a time when you were not able to pay the mortgage or rent on time? No     In the last 12 months, how many places have you lived? 1     In the last 12 months, was there a time when you did not have a steady place to sleep or slept in a shelter (including now)? No        Transportation Needs    In the past 12 months, has lack of transportation kept you from medical appointments or from getting medications? No     In the past 12 months, has lack of transportation  kept you from meetings, work, or from getting things needed for daily living? No        Food Insecurity    Within the past 12 months, you worried that your food would run out before you got the money to buy more. Never true     Within the past 12 months, the food you bought just didn't last and you didn't have money to get more. Never true        Stress    Do you feel stress - tense, restless, nervous, or anxious, or unable to sleep at night because your mind is troubled all the time - these days? Not at all        Social Connections    In a typical week, how many times do you talk on the phone with family, friends, or neighbors? More than three times a week     How often do you get together with friends or relatives? Once a week     How often do you attend Adventist or Church services? More than 4 times per year     Do you belong to any clubs or organizations such as Adventist groups, unions, fraternal or athletic groups, or school groups? Yes     How often do you attend meetings of the clubs or organizations you belong to? More than 4 times per year     Are you , , , , never , or living with a partner?         Alcohol Use    Q1: How often do you have a drink containing alcohol? Monthly or less     Q2: How many drinks containing alcohol do you have on a typical day when you are drinking? 1 or 2     Q3: How often do you have six or more drinks on one occasion? Never        OTHER    Name(s) of People in Home Safia Clement (Spouse) [phone]                   Sw spoke with pt at bedside to complete dcp initial assessment. Pta, pt lived home with Safia Clement (Spouse) [phone] . No hh, no dme. Dcp is to return home with spouse on this day. Ss following for dc needs and recommendations.

## 2023-11-22 NOTE — DISCHARGE SUMMARY
[facility]  General Surgery  Discharge Summary      Patient Name: Chano Clement  MRN: [id number]  Admission Date: 11/21/2023  Hospital Length of Stay: 1 days  Discharge Date and Time:  11/22/2023 8:37 AM  Attending Physician: Val Clifton MD   Discharging Provider: Val Clifton MD  Primary Care Provider: Calista Wright FNP    HPI:   Left upper ext laceration  power tool radial aspect volar wrist  Doppler signal palmar arch and ulnar artery  Parathesias along superficial branch radial nerve  Motor hand intrinsic intact    Procedure(s) (LRB):  REPAIR, SUPERFICIAL BRANCH OF RADIAL   NERVE (Left)  REPAIR BRACHIORADIALIS TENDON (Left)  REPAIR/LIGATION, ARTERY, RADIAL (Left)      Indwelling Lines/Drains at time of discharge:   Lines/Drains/Airways       None                 Hospital Course:  Patient admitted the hospital with the above laceration.  He had a transected tendon in addition to superficial branch of the radial nerve these were repaired     He is currently doing well without problems he will be discharged home with wrist splint appropriate wound care instructions were given     He has chronic pain medications we will not add any addition to that     Call for problems see him in 10 days paragraphs hands warm well vascularized   Patient had transected radial artery that was ligated    Goals of Care Treatment Preferences:         Consults:     Significant Diagnostic Studies: N/A    Pending Diagnostic Studies:       None          Final Active Diagnoses:    Diagnosis Date Noted POA    PRINCIPAL PROBLEM:  Laceration of left wrist [S61.512A] 11/21/2023 Yes      Problems Resolved During this Admission:      Discharged Condition: good    Disposition: Home or Self Care    Follow Up:   Follow-up Information       Alessandra Gutierrez ACNP Follow up.    Specialties: Vascular Surgery, Emergency Medicine  Contact information:  [address]  [facility]  [facility] [address]  [phone]                           Patient Instructions:      Diet general     Call MD for:  severe uncontrolled pain     Remove dressing in 48 hours     Wound care routine (specify)   Order Comments: Wound care routine:  may shower after dressing removed dry wound place dressing and replace wrist splint     Medications:  Reconciled Home Medications:      Medication List        CONTINUE taking these medications      aspirin 325 MG EC tablet  Commonly known as: ECOTRIN  Take 325 mg by mouth once daily.     cyclobenzaprine 10 MG tablet  Commonly known as: FLEXERIL  Take 1 tablet (10 mg total) by mouth nightly as needed for Muscle spasms.     diclofenac sodium 1 % Gel  Commonly known as: VOLTAREN  Apply to back four times daily as needed for pain     DULoxetine 60 MG capsule  Commonly known as: CYMBALTA  Take 1 capsule (60 mg total) by mouth 2 (two) times daily.     gabapentin 600 MG tablet  Commonly known as: NEURONTIN  Take 2 tablets (1,200 mg total) by mouth 2 (two) times daily.     HYDROcodone-acetaminophen  mg per tablet  Commonly known as: NORCO  Take 1 tablet by mouth 3 (three) times daily as needed for Pain.     hydrocortisone 2.5 % cream  Apply topically 2 (two) times daily.     lisinopriL 40 MG tablet  Commonly known as: PRINIVIL,ZESTRIL  Take 1 tablet (40 mg total) by mouth once daily.     loratadine 10 mg tablet  Commonly known as: CLARITIN  Take 1 tablet (10 mg total) by mouth once daily.     metFORMIN 500 MG ER 24hr tablet  Commonly known as: GLUCOPHAGE-XR  Take 1 tablet (500 mg total) by mouth 2 (two) times daily with meals.     mupirocin 2 % ointment  Commonly known as: BACTROBAN  Apply topically 3 (three) times daily.     NARCAN 4 mg/actuation Spry  Generic drug: naloxone  1 spray by Nasal route daily as needed.     pramipexole 0.25 MG tablet  Commonly known as: MIRAPEX  Take 1 tablet (0.25 mg total) by mouth every evening.     rosuvastatin 20 MG tablet  Commonly  known as: CRESTOR  Take 1 tablet (20 mg total) by mouth once daily.     tamsulosin 0.4 mg Cap  Commonly known as: FLOMAX  Take 1 capsule (0.4 mg total) by mouth once daily.     traZODone 50 MG tablet  Commonly known as: DESYREL  Take 4 tablets (200 mg total) by mouth every evening.            ASK your doctor about these medications      albuterol 90 mcg/actuation inhaler  Commonly known as: VENTOLIN HFA  Inhale 2 puffs into the lungs every 4 (four) hours as needed for Wheezing or Shortness of Breath. Rescue            Time spent on the discharge of patient: 15 minutes    Val Clifton MD  General Surgery  [facility]

## 2023-11-22 NOTE — PLAN OF CARE
Problem: Adult Inpatient Plan of Care  Goal: Absence of Hospital-Acquired Illness or Injury  Outcome: Ongoing, Progressing     Problem: Adult Inpatient Plan of Care  Goal: Optimal Comfort and Wellbeing  Outcome: Ongoing, Progressing     Problem: Impaired Wound Healing  Goal: Optimal Wound Healing  Outcome: Ongoing, Progressing     Problem: Fall Injury Risk  Goal: Absence of Fall and Fall-Related Injury  Outcome: Ongoing, Progressing

## 2023-11-22 NOTE — OP NOTE
Operative assistant note.    Procedure is ligation of left radial artery, repair superficial branch of radial nerve, repair of brachioradialis tendon.    I was present and scrubbed for this case for which Dr. Clifton was the attending surgeon.  I assisted Dr. Clifton with the exposure in repair of the above structures.  Please see Dr. Clifton's operative note for details.

## 2023-11-22 NOTE — TRANSFER OF CARE
Anesthesia Transfer of Care Note    Patient: Chano Clement    Procedure(s) Performed: Procedure(s) (LRB):  EXPLORATION, ARTERY, FEMORAL (Left)    Patient location: PACU    Anesthesia Type: general    Transport from OR: Transported from OR on 6-10 L/min O2 by face mask with adequate spontaneous ventilation    Post pain: adequate analgesia    Post assessment: no apparent anesthetic complications and tolerated procedure well    Post vital signs: stable    Level of consciousness: alert and awake    Nausea/Vomiting: no nausea/vomiting    Complications: none    Transfer of care protocol was followed      Last vitals: Visit Vitals  BP (!) 145/77   Pulse 96   Temp 36.4 °C (97.6 °F) (Oral)   Resp 15   SpO2 96%

## 2023-11-22 NOTE — ANESTHESIA POSTPROCEDURE EVALUATION
Anesthesia Post Evaluation    Patient: Chano Clement    Procedure(s) Performed: Procedure(s) (LRB):  REPAIR, SUPERFICIAL BRANCH OF RADIAL   NERVE (Left)  REPAIR BRACHIORADIALIS TENDON (Left)  REPAIR/LIGATION, ARTERY, RADIAL (Left)    Final Anesthesia Type: general      Patient location during evaluation: PACU  Post-procedure vital signs: reviewed and stable  Pain management: adequate  Airway patency: patent    PONV status at discharge: No PONV  Anesthetic complications: no      Cardiovascular status: hemodynamically stable  Respiratory status: unassisted  Hydration status: euvolemic  Follow-up not needed.          Vitals Value Taken Time   /64 11/21/23 2040   Temp 36.7 °C (98.1 °F) 11/21/23 2040   Pulse 82 11/21/23 2040   Resp 18 11/21/23 2025   SpO2 91 % 11/21/23 2040         Event Time   Out of Recovery 20:25:00         Pain/Naz Score: Pain Rating Prior to Med Admin: 6 (11/21/2023  5:28 PM)  Pain Rating Post Med Admin: 2 (11/21/2023  5:59 PM)  Naz Score: 8 (11/21/2023  8:25 PM)

## 2023-11-22 NOTE — ANESTHESIA PROCEDURE NOTES
Intubation    Date/Time: 11/21/2023 6:35 PM    Performed by: Bridgett Knutson CRNA  Authorized by: Gerry Kilpatrick MD    Intubation:     Induction:  Rapid sequence induction    Intubated:  Postinduction    Mask Ventilation:  Not attempted    Attempts:  1    Attempted By:  CRNA    Method of Intubation:  Direct    Blade:  Gerber 4    Laryngeal View Grade: Grade I - full view of cords      Difficult Airway Encountered?: No      Complications:  None    Airway Device:  Oral endotracheal tube    Airway Device Size:  7.0    Style/Cuff Inflation:  Cuffed    Inflation Amount (mL):  5    Tube secured:  22    Secured at:  The lips    Placement Verified By:  Capnometry    Complicating Factors:  None    Findings Post-Intubation:  BS equal bilateral

## 2023-11-22 NOTE — BRIEF OP NOTE
[facility] - Periop Services  Brief Operative Note    SUMMARY     Surgery Date: 11/21/2023     Surgeon(s) and Role:     * Val Clifton MD - Primary     * Yassine Kc MD - Assisting        Pre-op Diagnosis:  Laceration of left wrist, initial encounter [S61.512A]    Post-op Diagnosis:  Post-Op Diagnosis Codes:     * Laceration of left wrist, initial encounter [S61.512A]    Procedure(s) (LRB):  EXPLORATION, ARTERY, FEMORAL (Left)    Anesthesia: General    Implants:  * No implants in log *    Operative Findings: transection tendon, thumb, superficial branch radial nerve, radial artery    Ligate artery, repair tendon and nerve    Estimated Blood Loss: 20 mL    Estimated Blood Loss has been documented.20cc         Specimens:   Specimen (24h ago, onward)      None            [id number]

## 2023-11-24 ENCOUNTER — PATIENT OUTREACH (OUTPATIENT)
Dept: ADMINISTRATIVE | Facility: CLINIC | Age: 73
End: 2023-11-24

## 2023-11-24 NOTE — PROGRESS NOTES
C3 nurse spoke with Chano Clement  for a TCC post hospital discharge follow up call. The patient has a scheduled HOSFU appointment with Calista Wright FNP  on 12/4/23 @ 215.

## 2023-11-27 ENCOUNTER — TELEPHONE (OUTPATIENT)
Dept: FAMILY MEDICINE | Facility: CLINIC | Age: 73
End: 2023-11-27
Payer: COMMERCIAL

## 2023-11-27 DIAGNOSIS — R73.03 PREDIABETES: ICD-10-CM

## 2023-11-27 NOTE — TELEPHONE ENCOUNTER
Patient called stating he didn't think he ever started the metformin.  [place] [facility] [place]

## 2023-11-28 DIAGNOSIS — R73.03 PREDIABETES: ICD-10-CM

## 2023-11-28 DIAGNOSIS — L08.9: ICD-10-CM

## 2023-11-28 DIAGNOSIS — S60.511A: ICD-10-CM

## 2023-11-28 NOTE — TELEPHONE ENCOUNTER
Well it was on his med list confirmed as taking so I was under the impression he was taking it.  This is why med list accuracy is so important.  I am making treatment decisions based on these lists and don't have time to go back through every single med with patient.

## 2023-11-29 RX ORDER — TRAZODONE HYDROCHLORIDE 50 MG/1
200 TABLET ORAL NIGHTLY
Qty: 360 TABLET | Refills: 3 | Status: SHIPPED | OUTPATIENT
Start: 2023-11-29 | End: 2024-11-28

## 2023-11-29 NOTE — TELEPHONE ENCOUNTER
----- Message from Liz Phelps sent at 11/29/2023  8:41 AM CST -----  Pt need refill on traZODone sent to [facility]

## 2023-12-04 ENCOUNTER — OFFICE VISIT (OUTPATIENT)
Dept: VASCULAR SURGERY | Facility: CLINIC | Age: 73
End: 2023-12-04
Payer: MEDICARE

## 2023-12-04 ENCOUNTER — OFFICE VISIT (OUTPATIENT)
Dept: FAMILY MEDICINE | Facility: CLINIC | Age: 73
End: 2023-12-04
Payer: MEDICARE

## 2023-12-04 VITALS
RESPIRATION RATE: 20 BRPM | HEART RATE: 74 BPM | OXYGEN SATURATION: 97 % | SYSTOLIC BLOOD PRESSURE: 118 MMHG | DIASTOLIC BLOOD PRESSURE: 68 MMHG | HEIGHT: 70 IN | TEMPERATURE: 98 F | WEIGHT: 244 LBS | BODY MASS INDEX: 34.93 KG/M2

## 2023-12-04 VITALS
HEIGHT: 70 IN | BODY MASS INDEX: 34.93 KG/M2 | OXYGEN SATURATION: 99 % | HEART RATE: 18 BPM | WEIGHT: 244 LBS | RESPIRATION RATE: 18 BRPM

## 2023-12-04 DIAGNOSIS — Z09 POSTOP CHECK: Primary | ICD-10-CM

## 2023-12-04 DIAGNOSIS — M25.562 BILATERAL CHRONIC KNEE PAIN: Chronic | ICD-10-CM

## 2023-12-04 DIAGNOSIS — G89.29 BILATERAL CHRONIC KNEE PAIN: Chronic | ICD-10-CM

## 2023-12-04 DIAGNOSIS — M54.42 CHRONIC BILATERAL LOW BACK PAIN WITH LEFT-SIDED SCIATICA: Chronic | ICD-10-CM

## 2023-12-04 DIAGNOSIS — G89.29 CHRONIC BILATERAL LOW BACK PAIN WITH LEFT-SIDED SCIATICA: Chronic | ICD-10-CM

## 2023-12-04 DIAGNOSIS — M25.561 BILATERAL CHRONIC KNEE PAIN: Chronic | ICD-10-CM

## 2023-12-04 DIAGNOSIS — S61.512D LACERATION OF LEFT WRIST, SUBSEQUENT ENCOUNTER: Primary | ICD-10-CM

## 2023-12-04 PROCEDURE — 99024 POSTOP FOLLOW-UP VISIT: CPT | Mod: ,,, | Performed by: NURSE PRACTITIONER

## 2023-12-04 PROCEDURE — 99024 PR POST-OP FOLLOW-UP VISIT: ICD-10-PCS | Mod: ,,, | Performed by: NURSE PRACTITIONER

## 2023-12-04 PROCEDURE — 99215 OFFICE O/P EST HI 40 MIN: CPT | Mod: PBBFAC | Performed by: NURSE PRACTITIONER

## 2023-12-04 PROCEDURE — 99495 TCM SERVICES (MODERATE COMPLEXITY): ICD-10-PCS | Mod: ,,, | Performed by: NURSE PRACTITIONER

## 2023-12-04 PROCEDURE — 99495 TRANSJ CARE MGMT MOD F2F 14D: CPT | Mod: ,,, | Performed by: NURSE PRACTITIONER

## 2023-12-04 RX ORDER — METFORMIN HYDROCHLORIDE EXTENDED-RELEASE TABLETS 500 MG/1
500 TABLET, FILM COATED, EXTENDED RELEASE ORAL
COMMUNITY
Start: 2023-11-21 | End: 2023-12-04 | Stop reason: SDUPTHER

## 2023-12-04 RX ORDER — METFORMIN HYDROCHLORIDE 500 MG/1
500 TABLET, EXTENDED RELEASE ORAL
COMMUNITY

## 2023-12-04 RX ORDER — HYDROCODONE BITARTRATE AND ACETAMINOPHEN 10; 325 MG/1; MG/1
1 TABLET ORAL EVERY 6 HOURS PRN
COMMUNITY
Start: 2023-09-07 | End: 2024-03-04

## 2023-12-04 NOTE — PROGRESS NOTES
Subjective:       Patient ID: Chano Clement is a 72 y.o. male.    Chief Complaint: Follow-up (F/U 2 WEEK POST OP )    REPAIR, SUPERFICIAL BRANCH OF RADIAL   NERVE (Left)  REPAIR BRACHIORADIALIS TENDON (Left)  REPAIR/LIGATION, ARTERY, RADIAL (Left)      Surgeon(s) and Role:     * Val Clifton MD - Primary     * Yassine Kc MD - Assisting      Dr. Kc was present for the tendon and nerve repair assisted     12/4/2023 2 week postop follow-up incision healing, well no signs of infection palpable r ulnar pulses, mild numbness left index finger thumb neurovascular status intact good cap refill hand warm, gross motor fine motor skills intact left hand  Pre-Operative Diagnosis: Laceration of left wrist, initial encounter [S61.5  Past Medical History:   Diagnosis Date    Anxiety disorder, unspecified     Chronic bilateral low back pain with left-sided sciatica 04/12/2022    Chronic rhinitis 04/12/2022    Depressive disorder     Essential (primary) hypertension     GERD (gastroesophageal reflux disease)     History of colon polyps 04/12/2022    Hyperlipidemia     Laceration of left wrist 11/21/2023    RAYMUNDO on CPAP     Prediabetes 04/19/2023    Lab Results Component Value Date  HGBA1C 6.3 04/19/2023      RLS (restless legs syndrome)     TIA (transient ischemic attack)       Past Surgical History:   Procedure Laterality Date    BACK SURGERY      Caudal HARPER  02/18/2013    Dr Rdz    FOOT SURGERY Left     Left l4-L5 TFESI Left 06/20/2012    Dr Rdz    MOLE REMOVAL      NERVE REPAIR Left 11/21/2023    Procedure: REPAIR, SUPERFICIAL BRANCH OF RADIAL   NERVE;  Surgeon: Val Clifton MD;  Location: [facility] OR;  Service: General;  Laterality: Left;    REPAIR OF TENDON OF UPPER EXTREMITY Left 11/21/2023    Procedure: REPAIR BRACHIORADIALIS TENDON;  Surgeon: Val Clifton MD;  Location: [facility] OR;  Service: General;  Laterality: Left;    REPAIR, ARTERY, RADIAL Left 11/21/2023    Procedure: REPAIR/LIGATION,  "ARTERY, RADIAL;  Surgeon: Val Clifton MD;  Location: [facility];  Service: General;  Laterality: Left;    SHOULDER ARTHROSCOPY      T3-T4 HARPER  02/27/2012    Dr Rdz    TONSILLECTOMY      UVULECTOMY         reports that he quit smoking about 48 years ago. His smoking use included cigarettes. He started smoking about 58 years ago. He has a 10.0 pack-year smoking history. He has never used smokeless tobacco. He reports current alcohol use. He reports that he does not use drugs.   HPI  Review of Systems      Objective:      Pulse (!) 18   Resp 18   Ht 5' 10" (1.778 m)   Wt 110.7 kg (244 lb)   SpO2 99%   BMI 35.01 kg/m²    Physical Exam      Assessment:       1. Postop check        Plan:       DC sutures    OT eval outpatient call follow-up p.r.n. problems      "

## 2023-12-04 NOTE — PROGRESS NOTES
[facility] - FAMILY MEDICINE       PATIENT NAME: Chano Clement   : 1950    AGE: 72 y.o. DATE OF ENCOUNTER: 23    MRN: [id number]      PCP: Calista Wright FNP    Reason for Visit / Chief Complaint:  Hospital Follow Up (Patient presents to the clinic for hosp f/u )         274}    Subjective:     HPI:    Chano Clement presents for a Transitional Care Management hospital discharge follow-up visit. He was admitted to [facility] on 23 for laceration of left wrist with surgical repair per Dr. Urbano Clifton and was discharged on 23.     Has f/u today 1:00 pm for suture removal. Reports they are planning for OT.    Family and/or Caretaker present at visit?  No.  Diagnostic tests reviewed/disposition: No diagnosic tests pending after this hospitalization.  Home health/community services discussion/referrals: Patient does not have home health established from hospital visit.  They do not need home health.  If needed, we will set up home health for the patient.   Establishment or re-establishment of referral orders for community resources: No other necessary community resources.   Discussion with other health care providers: No discussion with other health care providers necessary.     Discharge and current medications have been reconciled.      C/o increasing knee pain and difficulty walking  Bedroom is upstairs and is asking about a seat lift to carry up stairs.  Needs knees replaced.    Review of Systems:   Review of Systems   Constitutional:  Negative for chills and fever.   Respiratory: Negative.     Cardiovascular: Negative.    Gastrointestinal: Negative.    Musculoskeletal:  Positive for arthralgias (chronic) and back pain (chronic).   Skin:  Positive for wound (healing well).   Neurological:  Positive for numbness (chronic LLE). Negative for dizziness and headaches.   Psychiatric/Behavioral: Negative.  Nervous/anxious: chronic, stable.         Allergies and Meds: 274}   Review of patient's allergies indicates:  No Known Allergies     Current Outpatient Medications:     albuterol (VENTOLIN HFA) 90 mcg/actuation inhaler, Inhale 2 puffs into the lungs every 4 (four) hours as needed for Wheezing or Shortness of Breath. Rescue, Disp: 18 g, Rfl: 1    aspirin (ECOTRIN) 325 MG EC tablet, Take 325 mg by mouth once daily., Disp: , Rfl:     cyclobenzaprine (FLEXERIL) 10 MG tablet, Take 1 tablet (10 mg total) by mouth nightly as needed for Muscle spasms., Disp: 60 tablet, Rfl: 1    diclofenac sodium (VOLTAREN) 1 % Gel, Apply to back four times daily as needed for pain, Disp: 3000 g, Rfl: 5    DULoxetine (CYMBALTA) 60 MG capsule, Take 1 capsule (60 mg total) by mouth 2 (two) times daily., Disp: 180 capsule, Rfl: 3    gabapentin (NEURONTIN) 600 MG tablet, Take 2 tablets (1,200 mg total) by mouth 2 (two) times daily., Disp: 360 tablet, Rfl: 1    HYDROcodone-acetaminophen (NORCO)  mg per tablet, Take 1 tablet by mouth 3 (three) times daily as needed for Pain., Disp: 90 tablet, Rfl: 0    HYDROcodone-acetaminophen (NORCO)  mg per tablet, Take 1 tablet by mouth every 6 (six) hours as needed for Pain., Disp: , Rfl:     hydrocortisone 2.5 % cream, Apply topically 2 (two) times daily., Disp: 28 g, Rfl: 0    lisinopriL (PRINIVIL,ZESTRIL) 40 MG tablet, Take 1 tablet (40 mg total) by mouth once daily., Disp: 90 tablet, Rfl: 3    loratadine (CLARITIN) 10 mg tablet, Take 1 tablet (10 mg total) by mouth once daily., Disp: 90 tablet, Rfl: 3    metFORMIN (GLUCOPHAGE-XR) 500 MG ER 24hr tablet, Take 500 mg by mouth daily with breakfast., Disp: , Rfl:     mupirocin (BACTROBAN) 2 % ointment, Apply topically 3 (three) times daily., Disp: 22 g, Rfl: 0    NARCAN 4 mg/actuation Spry, 1 spray by Nasal route daily as needed., Disp: , Rfl:     pramipexole (MIRAPEX) 0.25 MG tablet, Take 1 tablet (0.25 mg total) by mouth every evening., Disp: 90 tablet, Rfl: 3    rosuvastatin  (CRESTOR) 20 MG tablet, Take 1 tablet (20 mg total) by mouth once daily., Disp: 90 tablet, Rfl: 3    tamsulosin (FLOMAX) 0.4 mg Cap, Take 1 capsule (0.4 mg total) by mouth once daily., Disp: 90 capsule, Rfl: 3    traZODone (DESYREL) 50 MG tablet, Take 4 tablets (200 mg total) by mouth every evening., Disp: 360 tablet, Rfl: 3    Labs:274}    I have reviewed old labs below:  Lab Results   Component Value Date    WBC 11.96 (H) 2023    RBC 4.60 2023    HGB 14.0 2023    HCT 41.5 2023     2023     2023    K 3.9 2023     2023    CALCIUM 8.7 2023    GLU 98 2023    BUN 18 2023    CREATININE 0.92 2023    EGFRNONAA 80 2022    ALT 27 2023    AST 14 (L) 2023    CHOL 134 2023    TRIG 57 2023    HDL 65 (H) 2023    LDLCALC 58 2023    TSH 1.040 2023    PSA 1.140 10/12/2022    HGBA1C 6.3 2023    MICROALBUR 0.5 2023       Medical History: 274}     Past Medical History:   Diagnosis Date    Anxiety disorder, unspecified     Chronic bilateral low back pain with left-sided sciatica 2022    Chronic rhinitis 2022    Depressive disorder     Essential (primary) hypertension     GERD (gastroesophageal reflux disease)     History of colon polyps 2022    Hyperlipidemia     Laceration of left wrist 2023    RAYMUNDO on CPAP     Prediabetes 2023    Lab Results Component Value Date  HGBA1C 6.3 2023      RLS (restless legs syndrome)     TIA (transient ischemic attack)       Social History     Tobacco Use   Smoking Status Former    Current packs/day: 0.00    Average packs/day: 1 pack/day for 10.0 years (10.0 ttl pk-yrs)    Types: Cigarettes    Start date:     Quit date:     Years since quittin.9   Smokeless Tobacco Never      Past Surgical History:   Procedure Laterality Date    BACK SURGERY      Caudal HARPER  2013    Dr Rdz    FOOT SURGERY Left      "Left l4-L5 TFESI Left 06/20/2012    Dr Rdz    MOLE REMOVAL      NERVE REPAIR Left 11/21/2023    Procedure: REPAIR, SUPERFICIAL BRANCH OF RADIAL   NERVE;  Surgeon: Val Clifton MD;  Location: [facility];  Service: General;  Laterality: Left;    REPAIR OF TENDON OF UPPER EXTREMITY Left 11/21/2023    Procedure: REPAIR BRACHIORADIALIS TENDON;  Surgeon: Val Clifton MD;  Location: [facility] OR;  Service: General;  Laterality: Left;    REPAIR, ARTERY, RADIAL Left 11/21/2023    Procedure: REPAIR/LIGATION, ARTERY, RADIAL;  Surgeon: Val Clifton MD;  Location: [facility] OR;  Service: General;  Laterality: Left;    SHOULDER ARTHROSCOPY      T3-T4 HARPER  02/27/2012    Dr Rdz    TONSILLECTOMY      UVULECTOMY          Health Maintenance: 274}     Health Maintenance         Date Due Completion Date    Shingles Vaccine (1 of 2) Never done ---    RSV Vaccine (Age 60+ and Pregnant patients) (1 - 1-dose 60+ series) Never done ---    COVID-19 Vaccine (3 - 2023-24 season) 09/01/2023 3/1/2021    PROSTATE-SPECIFIC ANTIGEN 10/12/2023 10/12/2022    Eye Exam 12/01/2023 12/1/2022    Override on 5/31/2022: Done ([facility])    Hemoglobin A1c (Prediabetes) 11/14/2024 11/14/2023    High Dose Statin 12/04/2024 12/4/2023    Colorectal Cancer Screening 05/10/2026 5/10/2021    Lipid Panel 11/14/2028 11/14/2023    TETANUS VACCINE 11/21/2033 11/21/2023            Objective:  274}   /68 (BP Location: Right arm, Patient Position: Sitting, BP Method: X-Large (Automatic))   Pulse 74   Temp 98.4 °F (36.9 °C) (Oral)   Resp 20   Ht 5' 10" (1.778 m)   Wt 110.7 kg (244 lb)   SpO2 97%   BMI 35.01 kg/m²     Wt Readings from Last 3 Encounters:   12/04/23 110.7 kg (244 lb)   12/04/23 110.7 kg (244 lb)   11/21/23 113.3 kg (249 lb 12.8 oz)     BP Readings from Last 3 Encounters:   12/04/23 118/68   11/22/23 139/67   11/14/23 132/70     Body mass index is 35.01 kg/m².     Physical Exam  Vitals and nursing note reviewed. "   Constitutional:       General: He is not in acute distress.     Appearance: Normal appearance. He is not ill-appearing.   HENT:      Head: Normocephalic.   Eyes:      Conjunctiva/sclera: Conjunctivae normal.   Cardiovascular:      Rate and Rhythm: Normal rate and regular rhythm.      Heart sounds: Normal heart sounds.   Pulmonary:      Effort: Pulmonary effort is normal. No respiratory distress.      Breath sounds: Normal breath sounds.   Skin:     General: Skin is warm and dry.   Neurological:      Mental Status: He is alert and oriented to person, place, and time.          Assessment and Plan: 274}     1. Laceration of left wrist, subsequent encounter  Comments:  f/u overnight hospitalization for repair laceration left wrist - healing well  Overview:  Surgical exploration of wrist    Pros and cons bleeding infection limb loss nerve damage all covered      2. Bilateral chronic knee pain  Comments:  Refer for PT, needs lift for stairs.  Orders:  -     Ambulatory referral/consult to Physical/Occupational Therapy; Future; Expected date: 12/11/2023    3. Chronic bilateral low back pain with left-sided sciatica  -     Ambulatory referral/consult to Physical/Occupational Therapy; Future; Expected date: 12/11/2023       Return to clinic as scheduled; and sooner as needed.    Future Appointments   Date Time Provider Department Center   1/4/2024  1:00 PM AWV NURSE, [facility]ICINE [facility] [place]   5/20/2024  8:00 AM Calista Wright FNP [facility] [place]        Signature:  ARGENTINA West

## 2023-12-09 DIAGNOSIS — Z71.89 COMPLEX CARE COORDINATION: ICD-10-CM

## 2024-01-16 ENCOUNTER — TELEPHONE (OUTPATIENT)
Dept: FAMILY MEDICINE | Facility: CLINIC | Age: 74
End: 2024-01-16
Payer: COMMERCIAL

## 2024-01-16 DIAGNOSIS — R52 PAIN: ICD-10-CM

## 2024-01-16 DIAGNOSIS — M62.838 MUSCLE SPASM: ICD-10-CM

## 2024-01-16 RX ORDER — DICLOFENAC SODIUM 10 MG/G
GEL TOPICAL
Qty: 3000 G | Refills: 5 | Status: SHIPPED | OUTPATIENT
Start: 2024-01-16 | End: 2024-03-26 | Stop reason: SDUPTHER

## 2024-01-16 RX ORDER — CYCLOBENZAPRINE HCL 10 MG
10 TABLET ORAL NIGHTLY PRN
Qty: 90 TABLET | Refills: 1 | Status: SHIPPED | OUTPATIENT
Start: 2024-01-16

## 2024-01-17 NOTE — TELEPHONE ENCOUNTER
----- Message from Lila Metz sent at 1/16/2024  1:03 PM CST -----  [facility]

## 2024-02-14 NOTE — PROGRESS NOTES
"[facility]       PATIENT NAME: Chano Clement   : 1950    AGE: 73 y.o. DATE OF ENCOUNTER: 24    MRN: [id number]      Chano Clement presented for a  Medicare AWV and comprehensive Health Risk Assessment today. The following components were reviewed and updated:    Medical history  Family History  Social history  Allergies and Current Medications  Health Risk Assessment  Health Maintenance  Care Team         ** See Completed Assessments for Annual Wellness Visit within the encounter summary.**         The following assessments were completed:  Living Situation  CAGE  Depression Screening  Timed Get Up and Go  Whisper Test  Cognitive Function Screening  Nutrition Screening  ADL Screening  PAQ Screening        Vitals:    24 1311   BP: 132/78   BP Location: Right arm   Patient Position: Sitting   Pulse: 83   Resp: 16   Temp: 98.4 °F (36.9 °C)   TempSrc: Oral   SpO2: 95%   Weight: 112.5 kg (248 lb)   Height: 5' 9" (1.753 m)     Body mass index is 36.62 kg/m².  Physical Exam  Vitals and nursing note reviewed.   Constitutional:       General: He is not in acute distress.     Appearance: Normal appearance. He is not ill-appearing.   HENT:      Head: Normocephalic.   Eyes:      Conjunctiva/sclera: Conjunctivae normal.   Cardiovascular:      Rate and Rhythm: Normal rate and regular rhythm.      Heart sounds: Normal heart sounds.   Pulmonary:      Effort: Pulmonary effort is normal. No respiratory distress.      Breath sounds: No wheezing, rhonchi or rales.   Skin:     General: Skin is warm and dry.      Coloration: Skin is not jaundiced or pale.   Neurological:      Mental Status: He is alert and oriented to person, place, and time.      Gait: Gait normal.   Psychiatric:         Mood and Affect: Mood normal.         Behavior: Behavior normal.         Thought Content: Thought content normal.         Judgment: Judgment normal.               Diagnoses and health risks " identified today and associated recommendations/orders:    1. Encounter for subsequent annual wellness visit (AWV) in Medicare patient    2. Essential (primary) hypertension  Comments:  Controlled, continue current meds and treatment.    3. Pure hypercholesterolemia  Assessment & Plan:   Latest Reference Range & Units 11/14/23 12:02   Cholesterol Total 0 - 200 mg/dL 134   HDL 40 - 60 mg/dL 65 (H)   CHOL/HDLC Ratio  2.1   LDL Calculated mg/dL 58   Non-HDL Cholesterol mg/dL 69   Triglycerides 35 - 150 mg/dL 57   VLDL Cholesterol Flavio mg/dL 11     Controlled, continue atorvastatin 40 mg daily.      4. Prediabetes  Overview:  Lab Results   Component Value Date    HGBA1C 6.3 04/19/2023         Assessment & Plan:  Lab Results   Component Value Date    HGBA1C 6.3 11/14/2023     Controlled, continue metformin.      5. RLS (restless legs syndrome)  Comments:  controlled, continue mirapex    6. BMI 36.0-36.9,adult    7. Screening for prostate cancer  -     PSA, Screening; Future; Expected date: 02/21/2024    8. Severe obesity (BMI 35.0-35.9 with comorbidity)  Assessment & Plan:  Body mass index is 36.62 kg/m². Morbid obesity complicates all aspects of disease management from diagnostic modalities to treatment. Weight loss encouraged and health benefits explained to patient.       9. Atherosclerotic cardiovascular disease  Comments:  Stable, continue statin.           Provided Chano with a 5-10 year written screening schedule and personal prevention plan. Recommendations were developed using the USPSTF age appropriate recommendations. Education, counseling, and referrals were provided as needed. After Visit Summary printed and given to patient which includes a list of additional screenings\tests needed.    Follow up in about 1 year (around 2/21/2025).    ARGENTINA West    Shingles vaccine - declined, RSV vaccine - VIS (10/19/2023) given with instructions to take at pharmacy, Covid vaccine - declined, Eye exam - [facility]  faxed, PSA level- ordered this visit.    I offered to discuss advanced care planning, including how to pick a person who would make decisions for you if you were unable to make them for yourself, called a health care power of , and what kind of decisions you might make such as use of life sustaining treatments such as ventilators and tube feeding when faced with a life limiting illness recorded on a living will that they will need to know. (How you want to be cared for as you near the end of your natural life)     X Patient is interested in learning more about how to make advanced directives.  I provided them paperwork and offered to discuss this with them.

## 2024-02-16 DIAGNOSIS — F32.A DEPRESSION, UNSPECIFIED DEPRESSION TYPE: ICD-10-CM

## 2024-02-16 NOTE — TELEPHONE ENCOUNTER
----- Message from Lila Metz sent at 2/16/2024 10:21 AM CST -----  DULOXETINE DR 60MG TO [facility] PHAR PT [phone]

## 2024-02-17 RX ORDER — DULOXETIN HYDROCHLORIDE 60 MG/1
60 CAPSULE, DELAYED RELEASE ORAL 2 TIMES DAILY
Qty: 180 CAPSULE | Refills: 3 | Status: SHIPPED | OUTPATIENT
Start: 2024-02-17

## 2024-02-21 ENCOUNTER — OFFICE VISIT (OUTPATIENT)
Dept: FAMILY MEDICINE | Facility: CLINIC | Age: 74
End: 2024-02-21
Payer: COMMERCIAL

## 2024-02-21 VITALS
WEIGHT: 248 LBS | OXYGEN SATURATION: 95 % | TEMPERATURE: 98 F | BODY MASS INDEX: 36.73 KG/M2 | DIASTOLIC BLOOD PRESSURE: 78 MMHG | HEIGHT: 69 IN | SYSTOLIC BLOOD PRESSURE: 132 MMHG | RESPIRATION RATE: 16 BRPM | HEART RATE: 83 BPM

## 2024-02-21 DIAGNOSIS — G25.81 RLS (RESTLESS LEGS SYNDROME): Chronic | ICD-10-CM

## 2024-02-21 DIAGNOSIS — E66.01 SEVERE OBESITY (BMI 35.0-35.9 WITH COMORBIDITY): Chronic | ICD-10-CM

## 2024-02-21 DIAGNOSIS — Z00.00 ENCOUNTER FOR SUBSEQUENT ANNUAL WELLNESS VISIT (AWV) IN MEDICARE PATIENT: Primary | ICD-10-CM

## 2024-02-21 DIAGNOSIS — Z12.5 SCREENING FOR PROSTATE CANCER: ICD-10-CM

## 2024-02-21 DIAGNOSIS — I25.10 ATHEROSCLEROTIC CARDIOVASCULAR DISEASE: Chronic | ICD-10-CM

## 2024-02-21 DIAGNOSIS — R73.03 PREDIABETES: Chronic | ICD-10-CM

## 2024-02-21 DIAGNOSIS — I10 ESSENTIAL (PRIMARY) HYPERTENSION: Chronic | ICD-10-CM

## 2024-02-21 DIAGNOSIS — E78.00 PURE HYPERCHOLESTEROLEMIA: Chronic | ICD-10-CM

## 2024-02-21 PROBLEM — S61.512A LACERATION OF LEFT WRIST: Status: RESOLVED | Noted: 2023-11-21 | Resolved: 2024-02-21

## 2024-02-21 LAB — PSA SERPL-MCNC: 1.1 NG/ML

## 2024-02-21 PROCEDURE — 1159F MED LIST DOCD IN RCRD: CPT | Mod: ,,, | Performed by: NURSE PRACTITIONER

## 2024-02-21 PROCEDURE — 3288F FALL RISK ASSESSMENT DOCD: CPT | Mod: ,,, | Performed by: NURSE PRACTITIONER

## 2024-02-21 PROCEDURE — 3078F DIAST BP <80 MM HG: CPT | Mod: ,,, | Performed by: NURSE PRACTITIONER

## 2024-02-21 PROCEDURE — G0439 PPPS, SUBSEQ VISIT: HCPCS | Mod: ,,, | Performed by: NURSE PRACTITIONER

## 2024-02-21 PROCEDURE — G0103 PSA SCREENING: HCPCS | Mod: ,,, | Performed by: CLINICAL MEDICAL LABORATORY

## 2024-02-21 PROCEDURE — 1125F AMNT PAIN NOTED PAIN PRSNT: CPT | Mod: ,,, | Performed by: NURSE PRACTITIONER

## 2024-02-21 PROCEDURE — 1170F FXNL STATUS ASSESSED: CPT | Mod: ,,, | Performed by: NURSE PRACTITIONER

## 2024-02-21 PROCEDURE — 3075F SYST BP GE 130 - 139MM HG: CPT | Mod: ,,, | Performed by: NURSE PRACTITIONER

## 2024-02-21 PROCEDURE — 1160F RVW MEDS BY RX/DR IN RCRD: CPT | Mod: ,,, | Performed by: NURSE PRACTITIONER

## 2024-02-21 PROCEDURE — 1101F PT FALLS ASSESS-DOCD LE1/YR: CPT | Mod: ,,, | Performed by: NURSE PRACTITIONER

## 2024-02-21 NOTE — LETTER
AUTHORIZATION FOR RELEASE OF   CONFIDENTIAL INFORMATION    Dear [facility],    We are seeing Chano Clement, date of birth 1950, in the clinic at [facility]IC[facility]. Calista Wright FNP is the patient's PCP. Chano Clement has an outstanding lab/procedure at the time we reviewed his chart. In order to help keep his health information updated, he has authorized us to request the following medical record(s):        (  )  MAMMOGRAM                                      (  )  COLONOSCOPY      (  )  PAP SMEAR                                          (  )  OUTSIDE LAB RESULTS     (  )  DEXA SCAN                                          (  x)  EYE EXAM            (  )  FOOT EXAM                                          (  )  ENTIRE RECORD     (  )  OUTSIDE IMMUNIZATIONS                 (  )  _______________         Please fax records to [facility], Lafferty, Jennifer P, FNP, [phone]     If you have any questions, please contact  at [phone].           Patient Name: Chano Clement  : 1950  Patient Phone #: [phone]

## 2024-02-21 NOTE — ASSESSMENT & PLAN NOTE
Body mass index is 36.62 kg/m². Morbid obesity complicates all aspects of disease management from diagnostic modalities to treatment. Weight loss encouraged and health benefits explained to patient.

## 2024-02-21 NOTE — ASSESSMENT & PLAN NOTE
Latest Reference Range & Units 11/14/23 12:02   Cholesterol Total 0 - 200 mg/dL 134   HDL 40 - 60 mg/dL 65 (H)   CHOL/HDLC Ratio  2.1   LDL Calculated mg/dL 58   Non-HDL Cholesterol mg/dL 69   Triglycerides 35 - 150 mg/dL 57   VLDL Cholesterol Flavio mg/dL 11     Controlled, continue atorvastatin 40 mg daily.

## 2024-02-21 NOTE — PATIENT INSTRUCTIONS
Counseling and Referral of Other Preventative  (Italic type indicates deductible and co-insurance are waived)    Patient Name: Chano Clement  Today's Date: 2/21/2024    Health Maintenance       Date Due Completion Date    Shingles Vaccine (1 of 2) Never done ---    RSV Vaccine (Age 60+ and Pregnant patients) (1 - 1-dose 60+ series) Never done ---    COVID-19 Vaccine (3 - 2023-24 season) 09/01/2023 3/1/2021    PROSTATE-SPECIFIC ANTIGEN 10/12/2023 10/12/2022    Eye Exam 12/01/2023 12/1/2022    Override on 5/31/2022: Done ([facility])    Hemoglobin A1c (Prediabetes) 11/14/2024 11/14/2023    High Dose Statin 12/04/2024 12/4/2023    Colorectal Cancer Screening 05/10/2026 5/10/2021    Lipid Panel 11/14/2028 11/14/2023    TETANUS VACCINE 11/21/2033 11/21/2023        No orders of the defined types were placed in this encounter.      The following information is provided to all patients.  This information is to help you find resources for any of the problems found today that may be affecting your health:                  Living healthy guide: ms.gov    Understanding Diabetes: www.diabetes.org      Eating healthy: www.cdc.gov/healthyweight      CDC home safety checklist: www.cdc.gov/steadi/patient.html      Agency on Aging: [place].gov    Alcoholics anonymous (AA): www.aa.org      Physical Activity: www.kyaw.nih.gov/nw9efsh      Tobacco use: ms.gov

## 2024-03-26 DIAGNOSIS — R52 PAIN: ICD-10-CM

## 2024-03-26 RX ORDER — DICLOFENAC SODIUM 10 MG/G
GEL TOPICAL
Qty: 3000 G | Refills: 5 | Status: SHIPPED | OUTPATIENT
Start: 2024-03-26 | End: 2024-04-03 | Stop reason: SDUPTHER

## 2024-03-26 NOTE — TELEPHONE ENCOUNTER
----- Message from Lila Metz sent at 3/26/2024  1:00 PM CDT -----  [facility][phone]

## 2024-04-03 DIAGNOSIS — R52 PAIN: ICD-10-CM

## 2024-04-03 NOTE — TELEPHONE ENCOUNTER
Pharmacy called stating the voltaren gel needs a different quantity. When originally sent the quantity was 3000g.  It comes 100g per tube per pharmacist.

## 2024-04-04 RX ORDER — DICLOFENAC SODIUM 10 MG/G
GEL TOPICAL
Qty: 900 G | Refills: 5 | Status: SHIPPED | OUTPATIENT
Start: 2024-04-04

## 2024-05-03 DIAGNOSIS — G62.9 NEUROPATHY: ICD-10-CM

## 2024-05-04 RX ORDER — GABAPENTIN 600 MG/1
1200 TABLET ORAL 2 TIMES DAILY
Qty: 360 TABLET | Refills: 1 | Status: SHIPPED | OUTPATIENT
Start: 2024-05-04

## 2024-05-20 ENCOUNTER — OFFICE VISIT (OUTPATIENT)
Dept: FAMILY MEDICINE | Facility: CLINIC | Age: 74
End: 2024-05-20
Payer: COMMERCIAL

## 2024-05-20 VITALS
OXYGEN SATURATION: 97 % | WEIGHT: 248 LBS | TEMPERATURE: 98 F | RESPIRATION RATE: 20 BRPM | HEART RATE: 63 BPM | HEIGHT: 69 IN | SYSTOLIC BLOOD PRESSURE: 151 MMHG | DIASTOLIC BLOOD PRESSURE: 74 MMHG | BODY MASS INDEX: 36.73 KG/M2

## 2024-05-20 DIAGNOSIS — Z79.899 ENCOUNTER FOR LONG-TERM (CURRENT) USE OF OTHER MEDICATIONS: ICD-10-CM

## 2024-05-20 DIAGNOSIS — R73.03 PREDIABETES: Chronic | ICD-10-CM

## 2024-05-20 DIAGNOSIS — E78.00 PURE HYPERCHOLESTEROLEMIA: Chronic | ICD-10-CM

## 2024-05-20 DIAGNOSIS — I10 ESSENTIAL (PRIMARY) HYPERTENSION: Primary | Chronic | ICD-10-CM

## 2024-05-20 LAB
ALBUMIN SERPL BCP-MCNC: 3.6 G/DL (ref 3.5–5)
ALBUMIN/GLOB SERPL: 1 {RATIO}
ALP SERPL-CCNC: 77 U/L (ref 45–115)
ALT SERPL W P-5'-P-CCNC: 23 U/L (ref 16–61)
ANION GAP SERPL CALCULATED.3IONS-SCNC: 9 MMOL/L (ref 7–16)
AST SERPL W P-5'-P-CCNC: 22 U/L (ref 15–37)
BASOPHILS # BLD AUTO: 0.04 K/UL (ref 0–0.2)
BASOPHILS NFR BLD AUTO: 0.5 % (ref 0–1)
BILIRUB SERPL-MCNC: 0.7 MG/DL (ref ?–1.2)
BUN SERPL-MCNC: 20 MG/DL (ref 7–18)
BUN/CREAT SERPL: 22 (ref 6–20)
CALCIUM SERPL-MCNC: 9.2 MG/DL (ref 8.5–10.1)
CHLORIDE SERPL-SCNC: 109 MMOL/L (ref 98–107)
CHOLEST SERPL-MCNC: 140 MG/DL (ref 0–200)
CHOLEST/HDLC SERPL: 2.2 {RATIO}
CO2 SERPL-SCNC: 25 MMOL/L (ref 21–32)
CREAT SERPL-MCNC: 0.9 MG/DL (ref 0.7–1.3)
DIFFERENTIAL METHOD BLD: ABNORMAL
EGFR (NO RACE VARIABLE) (RUSH/TITUS): 90 ML/MIN/1.73M2
EOSINOPHIL # BLD AUTO: 0.23 K/UL (ref 0–0.5)
EOSINOPHIL NFR BLD AUTO: 3 % (ref 1–4)
ERYTHROCYTE [DISTWIDTH] IN BLOOD BY AUTOMATED COUNT: 15.4 % (ref 11.5–14.5)
EST. AVERAGE GLUCOSE BLD GHB EST-MCNC: 134 MG/DL
GLOBULIN SER-MCNC: 3.5 G/DL (ref 2–4)
GLUCOSE SERPL-MCNC: 123 MG/DL (ref 74–106)
HBA1C MFR BLD HPLC: 6.3 % (ref 4.5–6.6)
HCT VFR BLD AUTO: 43.6 % (ref 40–54)
HDLC SERPL-MCNC: 63 MG/DL (ref 40–60)
HGB BLD-MCNC: 13.8 G/DL (ref 13.5–18)
IMM GRANULOCYTES # BLD AUTO: 0.02 K/UL (ref 0–0.04)
IMM GRANULOCYTES NFR BLD: 0.3 % (ref 0–0.4)
LDLC SERPL CALC-MCNC: 65 MG/DL
LDLC/HDLC SERPL: 1 {RATIO}
LYMPHOCYTES # BLD AUTO: 1.98 K/UL (ref 1–4.8)
LYMPHOCYTES NFR BLD AUTO: 25.4 % (ref 27–41)
MCH RBC QN AUTO: 29 PG (ref 27–31)
MCHC RBC AUTO-ENTMCNC: 31.7 G/DL (ref 32–36)
MCV RBC AUTO: 91.6 FL (ref 80–96)
MONOCYTES # BLD AUTO: 0.6 K/UL (ref 0–0.8)
MONOCYTES NFR BLD AUTO: 7.7 % (ref 2–6)
MPC BLD CALC-MCNC: 11.3 FL (ref 9.4–12.4)
NEUTROPHILS # BLD AUTO: 4.91 K/UL (ref 1.8–7.7)
NEUTROPHILS NFR BLD AUTO: 63.1 % (ref 53–65)
NONHDLC SERPL-MCNC: 77 MG/DL
NRBC # BLD AUTO: 0 X10E3/UL
NRBC, AUTO (.00): 0 %
PLATELET # BLD AUTO: 211 K/UL (ref 150–400)
POTASSIUM SERPL-SCNC: 4.1 MMOL/L (ref 3.5–5.1)
PROT SERPL-MCNC: 7.1 G/DL (ref 6.4–8.2)
RBC # BLD AUTO: 4.76 M/UL (ref 4.6–6.2)
SODIUM SERPL-SCNC: 139 MMOL/L (ref 136–145)
TRIGL SERPL-MCNC: 60 MG/DL (ref 35–150)
VLDLC SERPL-MCNC: 12 MG/DL
WBC # BLD AUTO: 7.78 K/UL (ref 4.5–11)

## 2024-05-20 PROCEDURE — 99214 OFFICE O/P EST MOD 30 MIN: CPT | Mod: ,,, | Performed by: NURSE PRACTITIONER

## 2024-05-20 PROCEDURE — 1160F RVW MEDS BY RX/DR IN RCRD: CPT | Mod: ,,, | Performed by: NURSE PRACTITIONER

## 2024-05-20 PROCEDURE — 85025 COMPLETE CBC W/AUTO DIFF WBC: CPT | Mod: ,,, | Performed by: CLINICAL MEDICAL LABORATORY

## 2024-05-20 PROCEDURE — 4010F ACE/ARB THERAPY RXD/TAKEN: CPT | Mod: ,,, | Performed by: NURSE PRACTITIONER

## 2024-05-20 PROCEDURE — 3288F FALL RISK ASSESSMENT DOCD: CPT | Mod: ,,, | Performed by: NURSE PRACTITIONER

## 2024-05-20 PROCEDURE — 3078F DIAST BP <80 MM HG: CPT | Mod: ,,, | Performed by: NURSE PRACTITIONER

## 2024-05-20 PROCEDURE — 1101F PT FALLS ASSESS-DOCD LE1/YR: CPT | Mod: ,,, | Performed by: NURSE PRACTITIONER

## 2024-05-20 PROCEDURE — 80061 LIPID PANEL: CPT | Mod: ,,, | Performed by: CLINICAL MEDICAL LABORATORY

## 2024-05-20 PROCEDURE — 80053 COMPREHEN METABOLIC PANEL: CPT | Mod: ,,, | Performed by: CLINICAL MEDICAL LABORATORY

## 2024-05-20 PROCEDURE — 3077F SYST BP >= 140 MM HG: CPT | Mod: ,,, | Performed by: NURSE PRACTITIONER

## 2024-05-20 PROCEDURE — 1125F AMNT PAIN NOTED PAIN PRSNT: CPT | Mod: ,,, | Performed by: NURSE PRACTITIONER

## 2024-05-20 PROCEDURE — 83036 HEMOGLOBIN GLYCOSYLATED A1C: CPT | Mod: ,,, | Performed by: CLINICAL MEDICAL LABORATORY

## 2024-05-20 PROCEDURE — 1159F MED LIST DOCD IN RCRD: CPT | Mod: ,,, | Performed by: NURSE PRACTITIONER

## 2024-05-20 RX ORDER — MULTIVITAMIN
1 TABLET ORAL DAILY
COMMUNITY

## 2024-05-20 RX ORDER — PRAVASTATIN SODIUM 80 MG/1
80 TABLET ORAL DAILY
COMMUNITY
End: 2024-05-20

## 2024-05-20 RX ORDER — AMOXICILLIN 500 MG
1 CAPSULE ORAL DAILY
COMMUNITY

## 2024-05-20 RX ORDER — DAPAGLIFLOZIN AND METFORMIN HYDROCHLORIDE 5; 1000 MG/1; MG/1
1 TABLET, FILM COATED, EXTENDED RELEASE ORAL EVERY MORNING
COMMUNITY
End: 2024-05-20

## 2024-05-20 RX ORDER — LOSARTAN POTASSIUM 100 MG/1
100 TABLET ORAL DAILY
COMMUNITY
End: 2024-05-20

## 2024-05-20 NOTE — PROGRESS NOTES
[facility] - FAMILY MEDICINE       PATIENT NAME: Chano Clement   : 1950    AGE: 73 y.o. DATE OF ENCOUNTER: 24    MRN: [id number]      PCP: Calista Wright FNP    Subjective:     Reason for Visit / Chief Complaint:     274}  Chief Complaint   Patient presents with    Follow-up     Patient presents to the clinic for 6m do not have his hearing aids in today have not taken meds today bc of fasting    Hypertension    Hyperlipidemia    Health Maintenance     Had shingles vaccine 1 done by dr mariah lantigua one  Eye exam was last year [facility]      Chronic Pain     Shoulder knees and back    Diabetes       HPI:    Presents for f/u HTN, HLD, and prediabetes.  Is followed by [facility] for chronic low back pain LLE sciatica and chronic arthralgias.  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    Review of Systems:     Review of Systems   Constitutional:  Negative for chills and fever.   Respiratory: Negative.     Cardiovascular: Negative.    Gastrointestinal: Negative.    Musculoskeletal:  Positive for arthralgias (chronic) and back pain (chronic).   Skin:  Positive for rash.   Neurological:  Positive for numbness (chronic LLE). Negative for dizziness and headaches.   Psychiatric/Behavioral: Negative.  Nervous/anxious: chronic, stable.        Allergies and Meds: 274}     Review of patient's allergies indicates:  No Known Allergies     Current Outpatient Medications   Medication Sig Dispense Refill    albuterol (VENTOLIN HFA) 90 mcg/actuation inhaler Inhale 2 puffs into the lungs every 4 (four) hours as needed for Wheezing or Shortness of Breath. Rescue 18 g 1    aspirin (ECOTRIN) 325 MG EC tablet Take 325 mg by mouth once daily. Takes on Monday, Wednesday, and Friday      cyclobenzaprine (FLEXERIL) 10 MG tablet Take 1 tablet (10 mg total) by mouth nightly as needed for Muscle spasms. 90 tablet 1    diclofenac sodium  (VOLTAREN) 1 % Gel Apply to back four times daily as needed for pain 900 g 5    DULoxetine (CYMBALTA) 60 MG capsule Take 1 capsule (60 mg total) by mouth 2 (two) times daily. 180 capsule 3    gabapentin (NEURONTIN) 600 MG tablet Take 2 tablets (1,200 mg total) by mouth 2 (two) times daily. 360 tablet 1    HYDROcodone-acetaminophen (NORCO)  mg per tablet Take 1 tablet by mouth 3 (three) times daily as needed for Pain. 90 tablet 0    hydrocortisone 2.5 % cream Apply topically 2 (two) times daily. 28 g 0    lisinopriL (PRINIVIL,ZESTRIL) 40 MG tablet Take 1 tablet (40 mg total) by mouth once daily. 90 tablet 3    loratadine (CLARITIN) 10 mg tablet Take 1 tablet (10 mg total) by mouth once daily. 90 tablet 3    metFORMIN (GLUCOPHAGE-XR) 500 MG ER 24hr tablet Take 500 mg by mouth daily with breakfast.      multivitamin (THERAGRAN) per tablet Take 1 tablet by mouth once daily.      NARCAN 4 mg/actuation Spry 1 spray by Nasal route daily as needed.      omega-3 fatty acids/fish oil (FISH OIL-OMEGA-3 FATTY ACIDS) 300-1,000 mg capsule Take 1 capsule by mouth once daily.      pramipexole (MIRAPEX) 0.25 MG tablet Take 1 tablet (0.25 mg total) by mouth every evening. 90 tablet 3    rosuvastatin (CRESTOR) 20 MG tablet Take 1 tablet (20 mg total) by mouth once daily. 90 tablet 3    tamsulosin (FLOMAX) 0.4 mg Cap Take 1 capsule (0.4 mg total) by mouth once daily. 90 capsule 3    traZODone (DESYREL) 50 MG tablet Take 4 tablets (200 mg total) by mouth every evening. 360 tablet 3     No current facility-administered medications for this visit.       Labs:274}   I have reviewed labs below:    Lab Results   Component Value Date    WBC 11.96 (H) 11/21/2023    RBC 4.60 11/21/2023    HGB 14.0 11/21/2023    HCT 41.5 11/21/2023     11/21/2023     11/21/2023    K 3.9 11/21/2023     11/21/2023    CALCIUM 8.7 11/21/2023    GLU 98 11/21/2023    BUN 18 11/21/2023    CREATININE 0.92 11/21/2023    EGFRNONAA 80 04/12/2022     ALT 27 2023    AST 14 (L) 2023    CHOL 134 2023    TRIG 57 2023    HDL 65 (H) 2023    LDLCALC 58 2023    TSH 1.040 2023    PSA 1.100 2024    HGBA1C 6.3 2023    MICROALBUR 0.5 2023     Medical History: 274}     Past Medical History:   Diagnosis Date    Anxiety disorder, unspecified     Chronic bilateral low back pain with left-sided sciatica 2022    Chronic rhinitis 2022    Depressive disorder     Essential (primary) hypertension     GERD (gastroesophageal reflux disease)     History of colon polyps 2022    Hyperlipidemia     Laceration of left wrist 2023    RAYMUNDO on CPAP     Prediabetes 2023    Lab Results Component Value Date  HGBA1C 6.3 2023      RLS (restless legs syndrome)     TIA (transient ischemic attack)       Social History     Tobacco Use   Smoking Status Former    Current packs/day: 0.00    Average packs/day: 1 pack/day for 10.0 years (10.0 ttl pk-yrs)    Types: Cigarettes    Start date:     Quit date:     Years since quittin.4    Passive exposure: Never   Smokeless Tobacco Never      Past Surgical History:   Procedure Laterality Date    BACK SURGERY      Caudal HARPER  2013    Dr Rdz    FOOT SURGERY Left     Left l4-L5 TFESI Left 2012    Dr Rdz    MOLE REMOVAL      NERVE REPAIR Left 2023    Procedure: REPAIR, SUPERFICIAL BRANCH OF RADIAL   NERVE;  Surgeon: Val Clifton MD;  Location: [facility] OR;  Service: General;  Laterality: Left;    REPAIR OF TENDON OF UPPER EXTREMITY Left 2023    Procedure: REPAIR BRACHIORADIALIS TENDON;  Surgeon: Val Clifton MD;  Location: [facility] OR;  Service: General;  Laterality: Left;    REPAIR, ARTERY, RADIAL Left 2023    Procedure: REPAIR/LIGATION, ARTERY, RADIAL;  Surgeon: Val Clifton MD;  Location: [facility] OR;  Service: General;  Laterality: Left;    SHOULDER ARTHROSCOPY Bilateral     T3-T4 HARPER  2012      "Kajal    TONSILLECTOMY      UVULECTOMY          Health Maintenance: {      Health Maintenance         Date Due Completion Date    Shingles Vaccine (1 of 2) Never done ---    RSV Vaccine (Age 60+ and Pregnant patients) (1 - 1-dose 60+ series) Never done ---    COVID-19 Vaccine (3 - 2023-24 season) 09/01/2023 3/1/2021    Eye Exam 12/01/2023 12/1/2022    Override on 5/31/2022: Done ([facility])    Hemoglobin A1c (Prediabetes) 11/14/2024 11/14/2023    PROSTATE-SPECIFIC ANTIGEN 02/21/2025 2/21/2024    High Dose Statin 05/20/2025 5/20/2024    Colorectal Cancer Screening 05/10/2026 5/10/2021    Lipid Panel 11/14/2028 11/14/2023    TETANUS VACCINE 11/21/2033 11/21/2023          Immunization History   Administered Date(s) Administered    COVID-19, MRNA, LN-S, PF (MODERNA FULL 0.5 ML DOSE) 02/01/2021, 03/01/2021    Hepatitis B, Adult 02/29/2000, 05/30/2000    Influenza (FLUAD) - Quadrivalent - Adjuvanted - PF *Preferred* (65+) 10/12/2022, 11/14/2023    Influenza - Quadrivalent - High Dose - PF (65 years and older) 01/04/2022    Influenza Whole 11/02/1999, 02/05/2002    Pneumococcal Conjugate - 13 Valent 01/24/2015    Pneumococcal Conjugate - 20 Valent 07/16/2019    Pneumococcal Polysaccharide - 23 Valent 03/10/2016    Td (ADULT) 11/23/1998    Tdap 06/29/2010, 08/08/2013, 01/10/2017, 01/06/2019, 11/21/2023       Objective:  274}   Vital Signs  Temp: 97.8 °F (36.6 °C)  Temp Source: Oral  Pulse: 63  Resp: 20  SpO2: 97 %  BP: (!) 151/74  BP Location: Right arm  Patient Position: Sitting  Pain Score:   5  Pain Loc: Generalized  Height and Weight  Height: 5' 9" (175.3 cm)  Weight: 112.5 kg (248 lb)  BSA (Calculated - sq m): 2.34 sq meters  BMI (Calculated): 36.6  Weight in (lb) to have BMI = 25: 168.9    Over the last two weeks how often have you been bothered by little interest or pleasure in doing things: 0  Over the last two weeks how often have you been bothered by feeling down, depressed or hopeless: 1  PHQ-2 Total " Score: 1  PHQ-9 Score: 0  PHQ-9 Interpretation: Minimal or None    Wt Readings from Last 3 Encounters:   05/20/24 112.5 kg (248 lb)   02/21/24 112.5 kg (248 lb)   12/04/23 110.7 kg (244 lb)     Physical Exam  Vitals and nursing note reviewed.   Constitutional:       General: He is not in acute distress.     Appearance: Normal appearance. He is obese. He is not ill-appearing.   HENT:      Head: Normocephalic.      Right Ear: Tympanic membrane, ear canal and external ear normal.      Left Ear: Tympanic membrane, ear canal and external ear normal.      Nose: Nose normal.      Mouth/Throat:      Mouth: Mucous membranes are moist.      Pharynx: Oropharynx is clear. Uvula midline. No posterior oropharyngeal erythema or uvula swelling.   Eyes:      Conjunctiva/sclera: Conjunctivae normal.      Pupils: Pupils are equal, round, and reactive to light.   Neck:      Thyroid: No thyromegaly.      Vascular: Normal carotid pulses. No carotid bruit.   Cardiovascular:      Rate and Rhythm: Normal rate and regular rhythm.      Pulses: Normal pulses.      Heart sounds: Normal heart sounds.   Pulmonary:      Effort: Pulmonary effort is normal. No respiratory distress.      Breath sounds: Normal breath sounds. No wheezing, rhonchi or rales.   Musculoskeletal:      Cervical back: Neck supple.      Right lower leg: No edema.      Left lower leg: No edema.   Lymphadenopathy:      Cervical: No cervical adenopathy.   Skin:     General: Skin is warm and dry.      Capillary Refill: Capillary refill takes less than 2 seconds.      Coloration: Skin is not jaundiced or pale.   Neurological:      General: No focal deficit present.      Mental Status: He is alert and oriented to person, place, and time.      Gait: Gait normal.   Psychiatric:         Mood and Affect: Mood normal.         Behavior: Behavior normal.          Assessment and Plan: 274}     1. Essential (primary) hypertension  Assessment & Plan:  BP elevated today, has not taken  medication  Will have nurse call him in 3 days for remote BP reading.  Continue current meds.      2. Pure hypercholesterolemia  Assessment & Plan:  Controlled, continue atorvastatin 40 mg daily.    Orders:  -     Comprehensive Metabolic Panel; Future; Expected date: 05/20/2024  -     Lipid Panel; Future; Expected date: 05/20/2024    3. Prediabetes  Overview:  Dx w/ prediabetes April 2023 with A1c 6.3%.      Assessment & Plan:  Lab Results   Component Value Date    HGBA1C 6.3 11/14/2023     Continue metformin; denies side effects.    Orders:  -     Comprehensive Metabolic Panel; Future; Expected date: 05/20/2024  -     Hemoglobin A1C; Future; Expected date: 05/20/2024    4. Encounter for long-term (current) use of other medications  -     CBC Auto Differential; Future; Expected date: 05/20/2024      Diagnosis, risks, benefits, and side effects of meds and treatment plan were discussed with the patient.  Patient to call or follow-up with any new or worsening symptoms or problems prior to next appointment.  Go to ER for any urgent complications.  All questions were answered to the satisfaction of the patient, and pt verbalized understanding and agreement to treatment plan.      Follow up in about 6 months (around 11/20/2024) for hypertension, prediabetes, with nonfasting lab.    Signature:  ARGENTINA West-BC    Future Appointments   Date Time Provider Department Center   11/27/2024  9:00 AM Calista Wright FNP [facility] D[facility] [place]   2/26/2025 11:00 AM AWV NURSE, [facility] FAMILY MEDICINE [facility] [place]

## 2024-05-20 NOTE — ASSESSMENT & PLAN NOTE
BP elevated today, has not taken medication  Will have nurse call him in 3 days for remote BP reading.  Continue current meds.

## 2024-05-20 NOTE — ASSESSMENT & PLAN NOTE
Lab Results   Component Value Date    HGBA1C 6.3 11/14/2023     Continue metformin; denies side effects.

## 2024-05-23 ENCOUNTER — TELEPHONE (OUTPATIENT)
Dept: FAMILY MEDICINE | Facility: CLINIC | Age: 74
End: 2024-05-23
Payer: COMMERCIAL

## 2024-05-23 VITALS — SYSTOLIC BLOOD PRESSURE: 124 MMHG | DIASTOLIC BLOOD PRESSURE: 74 MMHG

## 2024-05-23 NOTE — PROGRESS NOTES
Call pt and review results.  Elevated FPG with A1c unchanged and right at diagnosis of T2DM.  Increase metformin 500 mg ER to 2 tablets daily.  WBCs back to normal.  Lipids well controlled with current treatment.  Get remote BP reading when you call him because BP was elevated at visit.  Thanks!

## 2024-05-23 NOTE — TELEPHONE ENCOUNTER
----- Message from ARGENTINA West sent at 5/20/2024 10:47 AM CDT -----  Regarding: Remote BP reading  Please call patient for remote BP reading in 3 days; had not taken med prior to appt.

## 2024-05-24 DIAGNOSIS — R73.03 PREDIABETES: Primary | Chronic | ICD-10-CM

## 2024-05-26 RX ORDER — METFORMIN HYDROCHLORIDE 500 MG/1
1000 TABLET, EXTENDED RELEASE ORAL DAILY
Qty: 180 TABLET | Refills: 3 | Status: SHIPPED | OUTPATIENT
Start: 2024-05-26

## 2024-06-07 ENCOUNTER — PATIENT OUTREACH (OUTPATIENT)
Facility: HOSPITAL | Age: 74
End: 2024-06-07
Payer: COMMERCIAL

## 2024-06-07 NOTE — PROGRESS NOTES
Population Health Chart Review & Patient Outreach Details    Updates Requested / Reviewed:  [x]  Care Team Updated    Health Maintenance Topics Addressed and Outreach Outcomes / Actions Taken:  Diabetic Eye Exam [x] HM Updated with September 2023 Eye  Exam (Dr. Mckeon). History Updated.

## 2024-07-08 DIAGNOSIS — I10 ESSENTIAL (PRIMARY) HYPERTENSION: Chronic | ICD-10-CM

## 2024-07-08 DIAGNOSIS — N40.0 BENIGN PROSTATIC HYPERPLASIA, UNSPECIFIED WHETHER LOWER URINARY TRACT SYMPTOMS PRESENT: Chronic | ICD-10-CM

## 2024-07-08 RX ORDER — TAMSULOSIN HYDROCHLORIDE 0.4 MG/1
0.4 CAPSULE ORAL DAILY
Qty: 90 CAPSULE | Refills: 3 | Status: SHIPPED | OUTPATIENT
Start: 2024-07-08

## 2024-07-08 RX ORDER — LISINOPRIL 40 MG/1
40 TABLET ORAL DAILY
Qty: 90 TABLET | Refills: 3 | Status: SHIPPED | OUTPATIENT
Start: 2024-07-08

## 2024-07-08 NOTE — TELEPHONE ENCOUNTER
----- Message from Karen Mckeon sent at 7/8/2024  8:50 AM CDT -----  Pt called and needs a refill on Flomax, and Lisinopril sent to [facility] call back is [phone]

## 2024-07-09 DIAGNOSIS — Z71.89 COMPLEX CARE COORDINATION: ICD-10-CM

## 2024-07-18 ENCOUNTER — TELEPHONE (OUTPATIENT)
Dept: FAMILY MEDICINE | Facility: CLINIC | Age: 74
End: 2024-07-18
Payer: COMMERCIAL

## 2024-07-18 DIAGNOSIS — E78.00 PURE HYPERCHOLESTEROLEMIA: Chronic | ICD-10-CM

## 2024-07-18 RX ORDER — ROSUVASTATIN CALCIUM 20 MG/1
20 TABLET, COATED ORAL DAILY
Qty: 90 TABLET | Refills: 3 | Status: SHIPPED | OUTPATIENT
Start: 2024-07-18 | End: 2025-07-18

## 2024-08-15 ENCOUNTER — OFFICE VISIT (OUTPATIENT)
Dept: FAMILY MEDICINE | Facility: CLINIC | Age: 74
End: 2024-08-15
Payer: COMMERCIAL

## 2024-08-15 VITALS
BODY MASS INDEX: 35.4 KG/M2 | TEMPERATURE: 98 F | WEIGHT: 239 LBS | OXYGEN SATURATION: 95 % | DIASTOLIC BLOOD PRESSURE: 67 MMHG | HEART RATE: 66 BPM | RESPIRATION RATE: 18 BRPM | HEIGHT: 69 IN | SYSTOLIC BLOOD PRESSURE: 111 MMHG

## 2024-08-15 DIAGNOSIS — J01.10 ACUTE NON-RECURRENT FRONTAL SINUSITIS: Primary | ICD-10-CM

## 2024-08-15 DIAGNOSIS — G25.81 RLS (RESTLESS LEGS SYNDROME): Chronic | ICD-10-CM

## 2024-08-15 DIAGNOSIS — G62.9 NEUROPATHY: Chronic | ICD-10-CM

## 2024-08-15 DIAGNOSIS — J31.0 CHRONIC RHINITIS: Chronic | ICD-10-CM

## 2024-08-15 PROCEDURE — 96372 THER/PROPH/DIAG INJ SC/IM: CPT | Mod: ,,, | Performed by: NURSE PRACTITIONER

## 2024-08-15 PROCEDURE — 1160F RVW MEDS BY RX/DR IN RCRD: CPT | Mod: ,,, | Performed by: NURSE PRACTITIONER

## 2024-08-15 PROCEDURE — 3044F HG A1C LEVEL LT 7.0%: CPT | Mod: ,,, | Performed by: NURSE PRACTITIONER

## 2024-08-15 PROCEDURE — 1159F MED LIST DOCD IN RCRD: CPT | Mod: ,,, | Performed by: NURSE PRACTITIONER

## 2024-08-15 PROCEDURE — 4010F ACE/ARB THERAPY RXD/TAKEN: CPT | Mod: ,,, | Performed by: NURSE PRACTITIONER

## 2024-08-15 PROCEDURE — 1101F PT FALLS ASSESS-DOCD LE1/YR: CPT | Mod: ,,, | Performed by: NURSE PRACTITIONER

## 2024-08-15 PROCEDURE — 3074F SYST BP LT 130 MM HG: CPT | Mod: ,,, | Performed by: NURSE PRACTITIONER

## 2024-08-15 PROCEDURE — 3008F BODY MASS INDEX DOCD: CPT | Mod: ,,, | Performed by: NURSE PRACTITIONER

## 2024-08-15 PROCEDURE — 99214 OFFICE O/P EST MOD 30 MIN: CPT | Mod: 25,,, | Performed by: NURSE PRACTITIONER

## 2024-08-15 PROCEDURE — 3078F DIAST BP <80 MM HG: CPT | Mod: ,,, | Performed by: NURSE PRACTITIONER

## 2024-08-15 PROCEDURE — 3288F FALL RISK ASSESSMENT DOCD: CPT | Mod: ,,, | Performed by: NURSE PRACTITIONER

## 2024-08-15 RX ORDER — DEXAMETHASONE SODIUM PHOSPHATE 4 MG/ML
4 INJECTION, SOLUTION INTRA-ARTICULAR; INTRALESIONAL; INTRAMUSCULAR; INTRAVENOUS; SOFT TISSUE
Status: COMPLETED | OUTPATIENT
Start: 2024-08-15 | End: 2024-08-15

## 2024-08-15 RX ORDER — AMOXICILLIN AND CLAVULANATE POTASSIUM 875; 125 MG/1; MG/1
1 TABLET, FILM COATED ORAL EVERY 12 HOURS
Qty: 20 TABLET | Refills: 0 | Status: SHIPPED | OUTPATIENT
Start: 2024-08-15 | End: 2024-08-25

## 2024-08-15 RX ORDER — GABAPENTIN 600 MG/1
1200 TABLET ORAL 2 TIMES DAILY
Qty: 360 TABLET | Refills: 1 | Status: SHIPPED | OUTPATIENT
Start: 2024-08-15

## 2024-08-15 RX ORDER — PRAMIPEXOLE DIHYDROCHLORIDE 0.25 MG/1
0.25 TABLET ORAL NIGHTLY
Qty: 90 TABLET | Refills: 3 | Status: SHIPPED | OUTPATIENT
Start: 2024-08-15 | End: 2025-08-15

## 2024-08-15 RX ORDER — CETIRIZINE HYDROCHLORIDE 10 MG/1
10 TABLET ORAL DAILY
Qty: 90 TABLET | Refills: 3 | Status: SHIPPED | OUTPATIENT
Start: 2024-08-15 | End: 2025-08-15

## 2024-08-15 RX ORDER — CEFTRIAXONE 1 G/1
1 INJECTION, POWDER, FOR SOLUTION INTRAMUSCULAR; INTRAVENOUS
Status: COMPLETED | OUTPATIENT
Start: 2024-08-15 | End: 2024-08-15

## 2024-08-15 RX ADMIN — DEXAMETHASONE SODIUM PHOSPHATE 4 MG: 4 INJECTION, SOLUTION INTRA-ARTICULAR; INTRALESIONAL; INTRAMUSCULAR; INTRAVENOUS; SOFT TISSUE at 09:08

## 2024-08-15 RX ADMIN — CEFTRIAXONE 1 G: 1 INJECTION, POWDER, FOR SOLUTION INTRAMUSCULAR; INTRAVENOUS at 09:08

## 2024-08-15 NOTE — PROGRESS NOTES
[facility]  [address] [place] [address]  Phone: [phone]  Fax: [phone]       PATIENT NAME: Chano Clement   : 1950    AGE: 73 y.o. DATE OF ENCOUNTER: 8/15/24    MRN: [id number]      PCP: Calista Wright FNP    Subjective:     Reason for Visit / Chief Complaint:     274}  Chief Complaint   Patient presents with    Sinusitis     Patient c/o sinus congestion and drainage for the past week. He states he has taken some leftover antibiotics, Cipro.    Health Maintenance     Care gaps addressed, patient has not had his eye exam this year. He states he has had his Shingles vaccines but is unsure of where, declines all other vaccines today.    Nikki Sanz CMA     Sinus s/s > 1 wk  Atarax helps for a few hours then drainage increases, nose is sore, and he sneezes a lot  Started taking cipro 6 days ago they had at home from an old rx of his wife's     Review of Systems:     Review of Systems   Constitutional: Negative.    HENT:  Positive for congestion, postnasal drip, rhinorrhea, sinus pressure and sneezing. Negative for ear pain and sore throat.    Respiratory:  Positive for cough. Negative for shortness of breath and wheezing.    Cardiovascular: Negative.    Neurological: Negative.        Allergies and Meds: 274}     Review of patient's allergies indicates:  No Known Allergies     Current Outpatient Medications   Medication Sig Dispense Refill    aspirin (ECOTRIN) 325 MG EC tablet Take 325 mg by mouth once daily. Takes on Monday, Wednesday, and Friday      cyclobenzaprine (FLEXERIL) 10 MG tablet Take 1 tablet (10 mg total) by mouth nightly as needed for Muscle spasms. 90 tablet 1    diclofenac sodium (VOLTAREN) 1 % Gel Apply to back four times daily as needed for pain 900 g 5    DULoxetine (CYMBALTA) 60 MG capsule Take 1 capsule (60 mg total) by mouth 2 (two) times daily. 180 capsule 3    HYDROcodone-acetaminophen (NORCO)  mg per tablet Take 1  tablet by mouth 3 (three) times daily as needed for Pain. 90 tablet 0    lisinopriL (PRINIVIL,ZESTRIL) 40 MG tablet Take 1 tablet (40 mg total) by mouth once daily. 90 tablet 3    metFORMIN (GLUCOPHAGE-XR) 500 MG ER 24hr tablet Take 2 tablets (1,000 mg total) by mouth Daily. 180 tablet 3    multivitamin (THERAGRAN) per tablet Take 1 tablet by mouth once daily.      NARCAN 4 mg/actuation Spry 1 spray by Nasal route daily as needed.      omega-3 fatty acids/fish oil (FISH OIL-OMEGA-3 FATTY ACIDS) 300-1,000 mg capsule Take 1 capsule by mouth once daily.      rosuvastatin (CRESTOR) 20 MG tablet Take 1 tablet (20 mg total) by mouth once daily. 90 tablet 3    tamsulosin (FLOMAX) 0.4 mg Cap Take 1 capsule (0.4 mg total) by mouth once daily. 90 capsule 3    traZODone (DESYREL) 50 MG tablet Take 4 tablets (200 mg total) by mouth every evening. 360 tablet 3    albuterol (VENTOLIN HFA) 90 mcg/actuation inhaler Inhale 2 puffs into the lungs every 4 (four) hours as needed for Wheezing or Shortness of Breath. Rescue (Patient not taking: Reported on 8/15/2024) 18 g 1    amoxicillin-clavulanate 875-125mg (AUGMENTIN) 875-125 mg per tablet Take 1 tablet by mouth every 12 (twelve) hours. for 10 days 20 tablet 0    cetirizine (ZYRTEC) 10 MG tablet Take 1 tablet (10 mg total) by mouth once daily. 90 tablet 3    gabapentin (NEURONTIN) 600 MG tablet Take 2 tablets (1,200 mg total) by mouth 2 (two) times daily. 360 tablet 1    pramipexole (MIRAPEX) 0.25 MG tablet Take 1 tablet (0.25 mg total) by mouth every evening. 90 tablet 3     No current facility-administered medications for this visit.     Medical History: 274}     Past Medical History:   Diagnosis Date    Anxiety disorder, unspecified     Chronic bilateral low back pain with left-sided sciatica 04/12/2022    Chronic rhinitis 04/12/2022    Depressive disorder     Diabetic eye exam 09/20/2023    Dr. Segundo Mckeon [facility]    Essential (primary) hypertension     GERD  "(gastroesophageal reflux disease)     History of colon polyps 2022    Hyperlipidemia     Laceration of left wrist 2023    RAYMUNDO on CPAP     Prediabetes 2023    Lab Results Component Value Date  HGBA1C 6.3 2023      RLS (restless legs syndrome)     TIA (transient ischemic attack)       Social History     Tobacco Use   Smoking Status Former    Current packs/day: 0.00    Average packs/day: 1 pack/day for 10.0 years (10.0 ttl pk-yrs)    Types: Cigarettes    Start date:     Quit date:     Years since quittin.6    Passive exposure: Never   Smokeless Tobacco Never      Objective:  274}   Vital Signs  Temp: 98.1 °F (36.7 °C)  Temp Source: Oral  Pulse: 66  Resp: 18  SpO2: 95 %  BP: 111/67  BP Location: Left arm  Patient Position: Sitting  Height and Weight  Height: 5' 9" (175.3 cm)  Weight: 108.4 kg (239 lb)  BSA (Calculated - sq m): 2.3 sq meters  BMI (Calculated): 35.3  Weight in (lb) to have BMI = 25: 168.9    Over the last two weeks how often have you been bothered by little interest or pleasure in doing things: 0  Over the last two weeks how often have you been bothered by feeling down, depressed or hopeless: 0  PHQ-2 Total Score: 0  PHQ-9 Score: 0  PHQ-9 Interpretation: Minimal or None    Wt Readings from Last 3 Encounters:   08/15/24 108.4 kg (239 lb)   24 112.5 kg (248 lb)   24 112.5 kg (248 lb)     Physical Exam  Vitals and nursing note reviewed.   Constitutional:       General: He is not in acute distress.     Appearance: Normal appearance. He is ill-appearing.   HENT:      Head: Normocephalic.      Right Ear: Hearing, tympanic membrane, ear canal and external ear normal.      Left Ear: Hearing, tympanic membrane, ear canal and external ear normal.      Nose: Mucosal edema, congestion and rhinorrhea present. Rhinorrhea is purulent.      Right Turbinates: Swollen.      Left Turbinates: Swollen.      Right Sinus: Frontal sinus tenderness present. No maxillary sinus " tenderness.      Left Sinus: Frontal sinus tenderness present. No maxillary sinus tenderness.      Mouth/Throat:      Lips: Pink.      Mouth: Mucous membranes are moist.      Tongue: No lesions.      Pharynx: Uvula midline. No pharyngeal swelling, oropharyngeal exudate, posterior oropharyngeal erythema or uvula swelling.      Comments: Pharynx injected, PND  Eyes:      General:         Right eye: No discharge.         Left eye: No discharge.      Conjunctiva/sclera: Conjunctivae normal.      Pupils: Pupils are equal, round, and reactive to light.   Cardiovascular:      Rate and Rhythm: Normal rate and regular rhythm.      Pulses: Normal pulses.      Heart sounds: Normal heart sounds.   Pulmonary:      Effort: Pulmonary effort is normal. No respiratory distress.      Breath sounds: Normal breath sounds. No wheezing, rhonchi or rales.   Musculoskeletal:      Cervical back: No rigidity.   Lymphadenopathy:      Cervical: No cervical adenopathy.   Skin:     General: Skin is warm and dry.      Coloration: Skin is not jaundiced or pale.      Findings: No rash.   Neurological:      General: No focal deficit present.      Mental Status: He is alert and oriented to person, place, and time.      Gait: Gait normal.   Psychiatric:         Mood and Affect: Mood normal.         Behavior: Behavior normal.         Thought Content: Thought content normal.         Judgment: Judgment normal.          Assessment and Plan: 274}     1. Acute non-recurrent frontal sinusitis  -     cefTRIAXone injection 1 g  -     dexAMETHasone injection 4 mg  -     amoxicillin-clavulanate 875-125mg (AUGMENTIN) 875-125 mg per tablet; Take 1 tablet by mouth every 12 (twelve) hours. for 10 days  Dispense: 20 tablet; Refill: 0    2. RLS (restless legs syndrome)  Assessment & Plan:  Controlled  Continue Mirapex 0.25 mg nightly.    Orders:  -     pramipexole (MIRAPEX) 0.25 MG tablet; Take 1 tablet (0.25 mg total) by mouth every evening.  Dispense: 90 tablet;  Refill: 3    3. Neuropathy  Assessment & Plan:  Chronic, stable  Gabapentin refills not noted on review of .  Refills to [facility] pharmacy.    Orders:  -     gabapentin (NEURONTIN) 600 MG tablet; Take 2 tablets (1,200 mg total) by mouth 2 (two) times daily.  Dispense: 360 tablet; Refill: 1    4. Chronic rhinitis  -     cetirizine (ZYRTEC) 10 MG tablet; Take 1 tablet (10 mg total) by mouth once daily.  Dispense: 90 tablet; Refill: 3      Diagnosis, risks, benefits, and side effects of any meds and treatment plan were discussed with the patient.  Patient to call or follow-up with any new or worsening symptoms or problems prior to next appointment.  Go to ER for any urgent complications.  All questions were answered to the satisfaction of the patient, and pt verbalized understanding and agreement to treatment plan.      Follow up if symptoms worsen or fail to improve, for and otherwise follow-up as routinely scheduled.    Signature:  ARGENTINA West-BC    Future Appointments   Date Time Provider Department Center   11/27/2024  9:00 AM Calista Wright FNP [facility] [place]   2/26/2025 11:00 AM AWV NURSE, [facility] FAMILY MEDICINE [facility] [place]

## 2024-08-15 NOTE — ASSESSMENT & PLAN NOTE
Chronic, stable  Gabapentin refills not noted on review of .  Refills to [facility] pharmacy.

## 2024-08-20 ENCOUNTER — TELEPHONE (OUTPATIENT)
Dept: FAMILY MEDICINE | Facility: CLINIC | Age: 74
End: 2024-08-20
Payer: COMMERCIAL

## 2024-08-20 NOTE — TELEPHONE ENCOUNTER
----- Message from Liz Phelps sent at 8/20/2024 10:57 AM CDT -----  Pt called wanting to let carl know her still not feeling any better after taking meds she gave him Pt # [phone] N

## 2024-09-09 ENCOUNTER — TELEPHONE (OUTPATIENT)
Dept: FAMILY MEDICINE | Facility: CLINIC | Age: 74
End: 2024-09-09
Payer: COMMERCIAL

## 2024-09-09 DIAGNOSIS — N40.1 BENIGN LOCALIZED PROSTATIC HYPERPLASIA WITH LOWER URINARY TRACT SYMPTOMS (LUTS): Primary | Chronic | ICD-10-CM

## 2024-09-09 RX ORDER — ALFUZOSIN HYDROCHLORIDE 10 MG/1
10 TABLET, EXTENDED RELEASE ORAL
Qty: 90 TABLET | Refills: 3 | Status: SHIPPED | OUTPATIENT
Start: 2024-09-09 | End: 2025-09-09

## 2024-09-09 NOTE — TELEPHONE ENCOUNTER
----- Message from Liz Phelps sent at 9/9/2024  2:11 PM CDT -----   Pt want to know if mohan sent in [facility] to [employer] [facility] Pt # [phone]

## 2024-09-09 NOTE — TELEPHONE ENCOUNTER
----- Message from Liz Phelps sent at 9/9/2024  2:42 PM CDT -----  Pt want to know if mohan sent in [facility] to [employer] base Pt # [phone]

## 2024-11-21 ENCOUNTER — OFFICE VISIT (OUTPATIENT)
Dept: FAMILY MEDICINE | Facility: CLINIC | Age: 74
End: 2024-11-21
Payer: COMMERCIAL

## 2024-11-21 VITALS
OXYGEN SATURATION: 96 % | TEMPERATURE: 98 F | WEIGHT: 237.81 LBS | HEIGHT: 69 IN | RESPIRATION RATE: 18 BRPM | BODY MASS INDEX: 35.22 KG/M2 | SYSTOLIC BLOOD PRESSURE: 114 MMHG | HEART RATE: 70 BPM | DIASTOLIC BLOOD PRESSURE: 70 MMHG

## 2024-11-21 DIAGNOSIS — J31.0 CHRONIC RHINITIS: Chronic | ICD-10-CM

## 2024-11-21 DIAGNOSIS — E66.01 SEVERE OBESITY (BMI 35.0-35.9 WITH COMORBIDITY): Chronic | ICD-10-CM

## 2024-11-21 DIAGNOSIS — R05.3 CHRONIC COUGH: Primary | Chronic | ICD-10-CM

## 2024-11-21 DIAGNOSIS — Z23 FLU VACCINE NEED: ICD-10-CM

## 2024-11-21 DIAGNOSIS — R53.83 OTHER FATIGUE: ICD-10-CM

## 2024-11-21 DIAGNOSIS — R73.03 PREDIABETES: Chronic | ICD-10-CM

## 2024-11-21 DIAGNOSIS — R52 PAIN: ICD-10-CM

## 2024-11-21 DIAGNOSIS — Z79.899 OTHER LONG TERM (CURRENT) DRUG THERAPY: ICD-10-CM

## 2024-11-21 PROBLEM — E78.49 OTHER HYPERLIPIDEMIA: Chronic | Status: ACTIVE | Noted: 2021-07-23

## 2024-11-21 PROBLEM — R19.01 ABDOMINAL MASS, RUQ (RIGHT UPPER QUADRANT): Status: RESOLVED | Noted: 2023-05-10 | Resolved: 2024-11-21

## 2024-11-21 LAB
25(OH)D3 SERPL-MCNC: 43.7 NG/ML (ref 30–80)
ALBUMIN SERPL BCP-MCNC: 3.9 G/DL (ref 3.4–4.8)
ALBUMIN/GLOB SERPL: 1.3 {RATIO}
ALP SERPL-CCNC: 70 U/L (ref 40–150)
ALT SERPL W P-5'-P-CCNC: 13 U/L
ANION GAP SERPL CALCULATED.3IONS-SCNC: 12 MMOL/L (ref 7–16)
AST SERPL W P-5'-P-CCNC: 17 U/L (ref 5–34)
BASOPHILS # BLD AUTO: 0.04 K/UL (ref 0–0.2)
BASOPHILS NFR BLD AUTO: 0.4 % (ref 0–1)
BILIRUB SERPL-MCNC: 0.5 MG/DL
BUN SERPL-MCNC: 17 MG/DL (ref 8–26)
BUN/CREAT SERPL: 21 (ref 6–20)
CALCIUM SERPL-MCNC: 9 MG/DL (ref 8.8–10)
CHLORIDE SERPL-SCNC: 106 MMOL/L (ref 98–107)
CO2 SERPL-SCNC: 25 MMOL/L (ref 23–31)
CREAT SERPL-MCNC: 0.82 MG/DL (ref 0.72–1.25)
DIFFERENTIAL METHOD BLD: ABNORMAL
EGFR (NO RACE VARIABLE) (RUSH/TITUS): 92 ML/MIN/1.73M2
EOSINOPHIL # BLD AUTO: 0.23 K/UL (ref 0–0.5)
EOSINOPHIL NFR BLD AUTO: 2.1 % (ref 1–4)
ERYTHROCYTE [DISTWIDTH] IN BLOOD BY AUTOMATED COUNT: 14.1 % (ref 11.5–14.5)
EST. AVERAGE GLUCOSE BLD GHB EST-MCNC: 123 MG/DL
FOLATE SERPL-MCNC: 16.6 NG/ML (ref 7–31.4)
GLOBULIN SER-MCNC: 3 G/DL (ref 2–4)
GLUCOSE SERPL-MCNC: 101 MG/DL (ref 82–115)
HBA1C MFR BLD HPLC: 5.9 %
HCT VFR BLD AUTO: 40.9 % (ref 40–54)
HGB BLD-MCNC: 13.5 G/DL (ref 13.5–18)
IMM GRANULOCYTES # BLD AUTO: 0.03 K/UL (ref 0–0.04)
IMM GRANULOCYTES NFR BLD: 0.3 % (ref 0–0.4)
LYMPHOCYTES # BLD AUTO: 2.42 K/UL (ref 1–4.8)
LYMPHOCYTES NFR BLD AUTO: 22.1 % (ref 27–41)
MCH RBC QN AUTO: 29.9 PG (ref 27–31)
MCHC RBC AUTO-ENTMCNC: 33 G/DL (ref 32–36)
MCV RBC AUTO: 90.7 FL (ref 80–96)
MONOCYTES # BLD AUTO: 0.96 K/UL (ref 0–0.8)
MONOCYTES NFR BLD AUTO: 8.8 % (ref 2–6)
MPC BLD CALC-MCNC: 10.8 FL (ref 9.4–12.4)
NEUTROPHILS # BLD AUTO: 7.25 K/UL (ref 1.8–7.7)
NEUTROPHILS NFR BLD AUTO: 66.3 % (ref 53–65)
NRBC # BLD AUTO: 0 X10E3/UL
NRBC, AUTO (.00): 0 %
PLATELET # BLD AUTO: 257 K/UL (ref 150–400)
POTASSIUM SERPL-SCNC: 4.4 MMOL/L (ref 3.5–5.1)
PROT SERPL-MCNC: 6.9 G/DL (ref 5.8–7.6)
RBC # BLD AUTO: 4.51 M/UL (ref 4.6–6.2)
SODIUM SERPL-SCNC: 139 MMOL/L (ref 136–145)
TSH SERPL DL<=0.005 MIU/L-ACNC: 1.03 UIU/ML (ref 0.35–4.94)
VIT B12 SERPL-MCNC: 557 PG/ML (ref 213–816)
WBC # BLD AUTO: 10.93 K/UL (ref 4.5–11)

## 2024-11-21 PROCEDURE — G0008 ADMIN INFLUENZA VIRUS VAC: HCPCS | Mod: ,,, | Performed by: NURSE PRACTITIONER

## 2024-11-21 PROCEDURE — 1159F MED LIST DOCD IN RCRD: CPT | Mod: ,,, | Performed by: NURSE PRACTITIONER

## 2024-11-21 PROCEDURE — 3008F BODY MASS INDEX DOCD: CPT | Mod: ,,, | Performed by: NURSE PRACTITIONER

## 2024-11-21 PROCEDURE — 82746 ASSAY OF FOLIC ACID SERUM: CPT | Mod: ,,, | Performed by: CLINICAL MEDICAL LABORATORY

## 2024-11-21 PROCEDURE — 1126F AMNT PAIN NOTED NONE PRSNT: CPT | Mod: ,,, | Performed by: NURSE PRACTITIONER

## 2024-11-21 PROCEDURE — 84443 ASSAY THYROID STIM HORMONE: CPT | Mod: ,,, | Performed by: CLINICAL MEDICAL LABORATORY

## 2024-11-21 PROCEDURE — 3044F HG A1C LEVEL LT 7.0%: CPT | Mod: ,,, | Performed by: NURSE PRACTITIONER

## 2024-11-21 PROCEDURE — 1101F PT FALLS ASSESS-DOCD LE1/YR: CPT | Mod: ,,, | Performed by: NURSE PRACTITIONER

## 2024-11-21 PROCEDURE — 80053 COMPREHEN METABOLIC PANEL: CPT | Mod: ,,, | Performed by: CLINICAL MEDICAL LABORATORY

## 2024-11-21 PROCEDURE — 83036 HEMOGLOBIN GLYCOSYLATED A1C: CPT | Mod: ,,, | Performed by: CLINICAL MEDICAL LABORATORY

## 2024-11-21 PROCEDURE — 82306 VITAMIN D 25 HYDROXY: CPT | Mod: ,,, | Performed by: CLINICAL MEDICAL LABORATORY

## 2024-11-21 PROCEDURE — 3078F DIAST BP <80 MM HG: CPT | Mod: ,,, | Performed by: NURSE PRACTITIONER

## 2024-11-21 PROCEDURE — 90653 IIV ADJUVANT VACCINE IM: CPT | Mod: ,,, | Performed by: NURSE PRACTITIONER

## 2024-11-21 PROCEDURE — 1160F RVW MEDS BY RX/DR IN RCRD: CPT | Mod: ,,, | Performed by: NURSE PRACTITIONER

## 2024-11-21 PROCEDURE — 3074F SYST BP LT 130 MM HG: CPT | Mod: ,,, | Performed by: NURSE PRACTITIONER

## 2024-11-21 PROCEDURE — 85025 COMPLETE CBC W/AUTO DIFF WBC: CPT | Mod: ,,, | Performed by: CLINICAL MEDICAL LABORATORY

## 2024-11-21 PROCEDURE — 4010F ACE/ARB THERAPY RXD/TAKEN: CPT | Mod: ,,, | Performed by: NURSE PRACTITIONER

## 2024-11-21 PROCEDURE — 99214 OFFICE O/P EST MOD 30 MIN: CPT | Mod: 25,,, | Performed by: NURSE PRACTITIONER

## 2024-11-21 PROCEDURE — 82607 VITAMIN B-12: CPT | Mod: ,,, | Performed by: CLINICAL MEDICAL LABORATORY

## 2024-11-21 PROCEDURE — 3288F FALL RISK ASSESSMENT DOCD: CPT | Mod: ,,, | Performed by: NURSE PRACTITIONER

## 2024-11-21 RX ORDER — DICLOFENAC SODIUM 10 MG/G
GEL TOPICAL
Qty: 900 G | Refills: 5 | Status: SHIPPED | OUTPATIENT
Start: 2024-11-21

## 2024-11-21 RX ORDER — ALBUTEROL SULFATE 90 UG/1
2 INHALANT RESPIRATORY (INHALATION) EVERY 4 HOURS PRN
Qty: 18 G | Refills: 1 | Status: SHIPPED | OUTPATIENT
Start: 2024-11-21

## 2024-11-21 NOTE — ASSESSMENT & PLAN NOTE
Lab Results   Component Value Date    HGBA1C 6.3 05/20/2024    HGBA1C 6.3 11/14/2023    HGBA1C 6.3 04/19/2023     Has lost 11 lb since last A1c.  Update A1c today.

## 2024-11-21 NOTE — ASSESSMENT & PLAN NOTE
Lab workup as discussed.  Update chest x-ray.  Discussed the importance of reconditioning with daily physical activity.

## 2024-11-21 NOTE — PROGRESS NOTES
[facility]  [address] [place] [address]  Phone: [phone]  Fax: [phone]       PATIENT NAME: Chano Clement   : 1950    AGE: 73 y.o. DATE OF ENCOUNTER: 24    MRN: [id number]      PCP: Calista Wright FNP    Subjective:     Reason for Visit / Chief Complaint:     274}  Chief Complaint   Patient presents with    Cough     Patient c/o lingering cough since he had Covid about 6 months ago.    Nasal Congestion     Patient c/o sinus congestion off and on since having Covid 6 months ago.    Fatigue     Patient c/o increased fatigue and weakness since having Covid 6 months ago.    Insect Bite     Patient c/o possible spider bite on his right forearm that happened about a week ago.    Health Maintenance     Care gaps addressed, patient had his eye exam about 6 months ago with Dr Mckeon, would like his Flu vaccine today.    Nikki Sanz, JUAN JOSÉ     Prolonged symptoms as above.  Has to blow nose every am, chest congestion in am that clears as the day goes.    Uses CPAP.  A few weeks ago nasal congestion at night and took sudafed, benadryl and would clear up enough to use CPAP and go to sleep.  Admits doesn't change CPAP filter as often as he should.    Review of Systems:     Review of Systems   Constitutional:  Positive for fatigue. Negative for chills and fever.   HENT:  Positive for congestion and rhinorrhea. Negative for ear pain and sore throat.    Respiratory:  Positive for cough.    Cardiovascular: Negative.    Gastrointestinal: Negative.    Musculoskeletal:  Positive for arthralgias (chronic) and back pain (chronic).   Skin:  Positive for rash.   Neurological:  Positive for numbness (chronic LLE). Negative for dizziness and headaches.   Psychiatric/Behavioral: Negative.  Nervous/anxious: chronic, stable.        Allergies and Meds: 274}     Review of patient's allergies indicates:  No Known Allergies     Current Outpatient Medications   Medication Sig  Dispense Refill    alfuzosin (UROXATRAL) 10 mg Tb24 Take 1 tablet (10 mg total) by mouth daily with breakfast. 90 tablet 3    aspirin (ECOTRIN) 325 MG EC tablet Take 325 mg by mouth once daily. Takes on Monday, Wednesday, and Friday      cetirizine (ZYRTEC) 10 MG tablet Take 1 tablet (10 mg total) by mouth once daily. 90 tablet 3    cyclobenzaprine (FLEXERIL) 10 MG tablet Take 1 tablet (10 mg total) by mouth nightly as needed for Muscle spasms. 90 tablet 1    DULoxetine (CYMBALTA) 60 MG capsule Take 1 capsule (60 mg total) by mouth 2 (two) times daily. 180 capsule 3    gabapentin (NEURONTIN) 600 MG tablet Take 2 tablets (1,200 mg total) by mouth 2 (two) times daily. 360 tablet 1    HYDROcodone-acetaminophen (NORCO)  mg per tablet Take 1 tablet by mouth 3 (three) times daily as needed for Pain. 90 tablet 0    lisinopriL (PRINIVIL,ZESTRIL) 40 MG tablet Take 1 tablet (40 mg total) by mouth once daily. 90 tablet 3    metFORMIN (GLUCOPHAGE-XR) 500 MG ER 24hr tablet Take 2 tablets (1,000 mg total) by mouth Daily. 180 tablet 3    multivitamin (THERAGRAN) per tablet Take 1 tablet by mouth once daily.      NARCAN 4 mg/actuation Spry 1 spray by Nasal route daily as needed.      omega-3 fatty acids/fish oil (FISH OIL-OMEGA-3 FATTY ACIDS) 300-1,000 mg capsule Take 1 capsule by mouth once daily.      pramipexole (MIRAPEX) 0.25 MG tablet Take 1 tablet (0.25 mg total) by mouth every evening. 90 tablet 3    rosuvastatin (CRESTOR) 20 MG tablet Take 1 tablet (20 mg total) by mouth once daily. 90 tablet 3    traZODone (DESYREL) 50 MG tablet Take 4 tablets (200 mg total) by mouth every evening. 360 tablet 3    albuterol (VENTOLIN HFA) 90 mcg/actuation inhaler Inhale 2 puffs into the lungs every 4 (four) hours as needed for Wheezing or Shortness of Breath. Rescue 18 g 1    diclofenac sodium (VOLTAREN) 1 % Gel Apply to back four times daily as needed for pain 900 g 5     No current facility-administered medications for this visit.        Labs:274}   I have reviewed labs below:    Lab Results   Component Value Date    WBC 7.78 2024    RBC 4.76 2024    HGB 13.8 2024    HCT 43.6 2024     2024     2024    K 4.1 2024     (H) 2024    CALCIUM 9.2 2024     (H) 2024    BUN 20 (H) 2024    CREATININE 0.90 2024    EGFRNONAA 80 2022    ALT 23 2024    AST 22 2024    CHOL 140 2024    TRIG 60 2024    HDL 63 (H) 2024    LDLCALC 65 2024    TSH 1.040 2023    PSA 1.100 2024    HGBA1C 6.3 2024    MICROALBUR 0.5 2023     Medical History: 274}     Past Medical History:   Diagnosis Date    Abdominal mass, RUQ (right upper quadrant) 05/10/2023    Exam and imaging does not support this diagnosis      Anxiety disorder, unspecified     Chronic bilateral low back pain with left-sided sciatica 2022    Chronic rhinitis 2022    Depressive disorder     Diabetic eye exam 2023    Dr. Raymond [facility]    Essential (primary) hypertension     GERD (gastroesophageal reflux disease)     History of colon polyps 2022    Hyperlipidemia     Laceration of left wrist 2023    RAYMUNDO on CPAP     Prediabetes 2023    Lab Results Component Value Date  HGBA1C 6.3 2023      RLS (restless legs syndrome)     TIA (transient ischemic attack)       Social History     Tobacco Use   Smoking Status Former    Current packs/day: 0.00    Average packs/day: 1 pack/day for 10.0 years (10.0 ttl pk-yrs)    Types: Cigarettes    Start date:     Quit date:     Years since quittin.9    Passive exposure: Never   Smokeless Tobacco Never      Past Surgical History:   Procedure Laterality Date    BACK SURGERY      Caudal HARPER  2013    Dr Rdz    FOOT SURGERY Left     Left l4-L5 TFESI Left 2012    Dr Rdz    MOLE REMOVAL      NERVE REPAIR Left 2023    Procedure: REPAIR,  "SUPERFICIAL BRANCH OF RADIAL   NERVE;  Surgeon: Vla Clifton MD;  Location: [facility];  Service: General;  Laterality: Left;    REPAIR OF TENDON OF UPPER EXTREMITY Left 11/21/2023    Procedure: REPAIR BRACHIORADIALIS TENDON;  Surgeon: Val Clifton MD;  Location: [facility] OR;  Service: General;  Laterality: Left;    REPAIR, ARTERY, RADIAL Left 11/21/2023    Procedure: REPAIR/LIGATION, ARTERY, RADIAL;  Surgeon: Val Clifton MD;  Location: [facility] OR;  Service: General;  Laterality: Left;    SHOULDER ARTHROSCOPY Bilateral     T3-T4 HARPER  02/27/2012    Dr Rdz    TONSILLECTOMY      UVULECTOMY          Objective:  274}   Vital Signs  Temp: 98.1 °F (36.7 °C)  Temp Source: Oral  Pulse: 70  Resp: 18  SpO2: 96 %  BP: 114/70  BP Location: Left arm  Patient Position: Sitting  Pain Score: 0-No pain  Height and Weight  Height: 5' 9" (175.3 cm)  Weight: 107.9 kg (237 lb 12.8 oz)  BSA (Calculated - sq m): 2.29 sq meters  BMI (Calculated): 35.1  Weight in (lb) to have BMI = 25: 168.9    Over the last two weeks how often have you been bothered by little interest or pleasure in doing things: 0  Over the last two weeks how often have you been bothered by feeling down, depressed or hopeless: 0  PHQ-2 Total Score: 0  PHQ-9 Score: 0  PHQ-9 Interpretation: Minimal or None    Wt Readings from Last 3 Encounters:   11/21/24 107.9 kg (237 lb 12.8 oz)   08/15/24 108.4 kg (239 lb)   05/20/24 112.5 kg (248 lb)     Physical Exam  Vitals and nursing note reviewed.   Constitutional:       General: He is not in acute distress.     Appearance: Normal appearance. He is obese. He is not ill-appearing.   HENT:      Head: Normocephalic.      Right Ear: Tympanic membrane, ear canal and external ear normal.      Left Ear: Tympanic membrane, ear canal and external ear normal.      Nose: Mucosal edema present. No rhinorrhea.      Mouth/Throat:      Mouth: Mucous membranes are moist.      Pharynx: Oropharynx is clear. Uvula midline. No " posterior oropharyngeal erythema or uvula swelling.   Eyes:      Conjunctiva/sclera: Conjunctivae normal.      Pupils: Pupils are equal, round, and reactive to light.   Neck:      Thyroid: No thyromegaly.      Vascular: Normal carotid pulses. No carotid bruit.   Cardiovascular:      Rate and Rhythm: Normal rate and regular rhythm.      Pulses: Normal pulses.      Heart sounds: Normal heart sounds.   Pulmonary:      Effort: Pulmonary effort is normal. No respiratory distress.      Breath sounds: Normal breath sounds. No wheezing, rhonchi or rales.   Abdominal:      Palpations: Abdomen is soft.      Tenderness: There is no abdominal tenderness.   Musculoskeletal:      Cervical back: Neck supple.      Right lower leg: No edema.      Left lower leg: No edema.   Lymphadenopathy:      Cervical: No cervical adenopathy.   Skin:     General: Skin is warm and dry.      Coloration: Skin is not jaundiced or pale.   Neurological:      General: No focal deficit present.      Mental Status: He is alert and oriented to person, place, and time.      Gait: Gait normal.   Psychiatric:         Mood and Affect: Mood normal.         Behavior: Behavior normal.     St     Assessment and Plan: 274}     1. Chronic cough  Assessment & Plan:  Chronic cough x6 mths  No past dx of asthma, COPD, chronic bronchitis.  Cough started with covid.  Remote history smoking, quit 50 yrs ago.  Uses albuterol HFA prn and requesting refill.  Update CXR then advise.    Orders:  -     albuterol (VENTOLIN HFA) 90 mcg/actuation inhaler; Inhale 2 puffs into the lungs every 4 (four) hours as needed for Wheezing or Shortness of Breath. Rescue  Dispense: 18 g; Refill: 1  -     X-Ray Chest PA And Lateral; Future; Expected date: 11/21/2024    2. Flu vaccine need  -     influenza (adjuvanted) (Fluad) 45 mcg/0.5 mL IM vaccine (> or = 66 yo) 0.5 mL    3. Pain  -     diclofenac sodium (VOLTAREN) 1 % Gel; Apply to back four times daily as needed for pain  Dispense: 900 g;  Refill: 5    4. Prediabetes  Overview:  Dx w/ prediabetes April 2023 with A1c 6.3%.      Assessment & Plan:  Lab Results   Component Value Date    HGBA1C 6.3 05/20/2024    HGBA1C 6.3 11/14/2023    HGBA1C 6.3 04/19/2023     Has lost 11 lb since last A1c.  Update A1c today.    Orders:  -     Hemoglobin A1C; Future; Expected date: 11/21/2024    5. Other fatigue  Assessment & Plan:  Lab workup as discussed.  Update chest x-ray.  Discussed the importance of reconditioning with daily physical activity.    Orders:  -     CBC Auto Differential; Future; Expected date: 11/21/2024  -     Comprehensive Metabolic Panel; Future; Expected date: 11/21/2024  -     TSH; Future; Expected date: 11/21/2024  -     Vitamin D; Future; Expected date: 11/21/2024  -     Vitamin B12 & Folate; Future; Expected date: 11/21/2024  -     TESTOSTERONE, FREE (DIALYSIS) AND TOTAL, LC/MS/MS; Future; Expected date: 11/21/2024  -     X-Ray Chest PA And Lateral; Future; Expected date: 11/21/2024    6. Other long term (current) drug therapy  -     TSH; Future; Expected date: 11/21/2024  -     Vitamin D; Future; Expected date: 11/21/2024  -     Vitamin B12 & Folate; Future; Expected date: 11/21/2024    7. Severe obesity (BMI 35.0-35.9 with comorbidity)  Assessment & Plan:  Continued weight loss and increased physical activity encouraged.    Orders:  -     Vitamin D; Future; Expected date: 11/21/2024    8. Chronic rhinitis  Assessment & Plan:  Not controlled  Continue daily Flonase.  Change filter in CPAP regularly.        Diagnosis, risks, benefits, and side effects of any meds and treatment plan were discussed with the patient.  All questions were answered to the satisfaction of the patient, and pt verbalized understanding and agreement to treatment plan.      Follow up for cancel 11/27/2024 appt addressed today & schedule 6-mth f/u , HTN, predm, HLD w/ fasting labs.    Signature:  ARGENTINA West-BC    Future Appointments   Date Time Provider  Department Center   11/27/2024  9:00 AM Calista Wright FNP [facility] D[facility] [place]   2/26/2025 11:00 AM JOIE NURSEALTON [facility] [place]

## 2024-11-21 NOTE — ASSESSMENT & PLAN NOTE
Chronic cough x6 mths  No past dx of asthma, COPD, chronic bronchitis.  Cough started with covid.  Remote history smoking, quit 50 yrs ago.  Uses albuterol HFA prn and requesting refill.  Update CXR then advise.

## 2024-11-22 DIAGNOSIS — R05.3 CHRONIC COUGH: Primary | ICD-10-CM

## 2024-12-02 DIAGNOSIS — E29.1 TESTOSTERONE DEFICIENCY IN MALE: Primary | ICD-10-CM

## 2024-12-04 DIAGNOSIS — F33.9 RECURRENT DEPRESSION: Primary | Chronic | ICD-10-CM

## 2024-12-04 DIAGNOSIS — G47.9 DIFFICULTY SLEEPING: ICD-10-CM

## 2024-12-04 RX ORDER — TRAZODONE HYDROCHLORIDE 50 MG/1
200 TABLET ORAL NIGHTLY
Qty: 360 TABLET | Refills: 3 | Status: SHIPPED | OUTPATIENT
Start: 2024-12-04 | End: 2025-12-04

## 2024-12-05 ENCOUNTER — OFFICE VISIT (OUTPATIENT)
Dept: PULMONOLOGY | Facility: CLINIC | Age: 74
End: 2024-12-05
Payer: COMMERCIAL

## 2024-12-05 VITALS
DIASTOLIC BLOOD PRESSURE: 76 MMHG | OXYGEN SATURATION: 94 % | HEART RATE: 77 BPM | HEIGHT: 69 IN | BODY MASS INDEX: 36.11 KG/M2 | WEIGHT: 243.81 LBS | RESPIRATION RATE: 18 BRPM | SYSTOLIC BLOOD PRESSURE: 120 MMHG

## 2024-12-05 DIAGNOSIS — R06.02 SHORTNESS OF BREATH: Primary | ICD-10-CM

## 2024-12-05 DIAGNOSIS — J31.0 CHRONIC RHINITIS: Chronic | ICD-10-CM

## 2024-12-05 DIAGNOSIS — R05.3 CHRONIC COUGH: ICD-10-CM

## 2024-12-05 PROCEDURE — 3078F DIAST BP <80 MM HG: CPT | Mod: CPTII,,, | Performed by: STUDENT IN AN ORGANIZED HEALTH CARE EDUCATION/TRAINING PROGRAM

## 2024-12-05 PROCEDURE — 99204 OFFICE O/P NEW MOD 45 MIN: CPT | Mod: S$PBB,,, | Performed by: STUDENT IN AN ORGANIZED HEALTH CARE EDUCATION/TRAINING PROGRAM

## 2024-12-05 PROCEDURE — 99999 PR PBB SHADOW E&M-EST. PATIENT-LVL V: CPT | Mod: PBBFAC,,, | Performed by: STUDENT IN AN ORGANIZED HEALTH CARE EDUCATION/TRAINING PROGRAM

## 2024-12-05 PROCEDURE — 1101F PT FALLS ASSESS-DOCD LE1/YR: CPT | Mod: CPTII,,, | Performed by: STUDENT IN AN ORGANIZED HEALTH CARE EDUCATION/TRAINING PROGRAM

## 2024-12-05 PROCEDURE — 4010F ACE/ARB THERAPY RXD/TAKEN: CPT | Mod: CPTII,,, | Performed by: STUDENT IN AN ORGANIZED HEALTH CARE EDUCATION/TRAINING PROGRAM

## 2024-12-05 PROCEDURE — 3008F BODY MASS INDEX DOCD: CPT | Mod: CPTII,,, | Performed by: STUDENT IN AN ORGANIZED HEALTH CARE EDUCATION/TRAINING PROGRAM

## 2024-12-05 PROCEDURE — 1160F RVW MEDS BY RX/DR IN RCRD: CPT | Mod: CPTII,,, | Performed by: STUDENT IN AN ORGANIZED HEALTH CARE EDUCATION/TRAINING PROGRAM

## 2024-12-05 PROCEDURE — 99215 OFFICE O/P EST HI 40 MIN: CPT | Mod: PBBFAC | Performed by: STUDENT IN AN ORGANIZED HEALTH CARE EDUCATION/TRAINING PROGRAM

## 2024-12-05 PROCEDURE — 1126F AMNT PAIN NOTED NONE PRSNT: CPT | Mod: CPTII,,, | Performed by: STUDENT IN AN ORGANIZED HEALTH CARE EDUCATION/TRAINING PROGRAM

## 2024-12-05 PROCEDURE — 3288F FALL RISK ASSESSMENT DOCD: CPT | Mod: CPTII,,, | Performed by: STUDENT IN AN ORGANIZED HEALTH CARE EDUCATION/TRAINING PROGRAM

## 2024-12-05 PROCEDURE — 1159F MED LIST DOCD IN RCRD: CPT | Mod: CPTII,,, | Performed by: STUDENT IN AN ORGANIZED HEALTH CARE EDUCATION/TRAINING PROGRAM

## 2024-12-05 PROCEDURE — 3074F SYST BP LT 130 MM HG: CPT | Mod: CPTII,,, | Performed by: STUDENT IN AN ORGANIZED HEALTH CARE EDUCATION/TRAINING PROGRAM

## 2024-12-05 PROCEDURE — 3044F HG A1C LEVEL LT 7.0%: CPT | Mod: CPTII,,, | Performed by: STUDENT IN AN ORGANIZED HEALTH CARE EDUCATION/TRAINING PROGRAM

## 2024-12-05 NOTE — ASSESSMENT & PLAN NOTE
74 yo M with remote smoking history and previous occupational exposures to jet fuel during service in the [employer] presents to establish care with history of chest congestion cough.  Labs notable for SEos peaking at 1080.  CXR reviewed and no dedicated CT imaging to date.  Given length of symptom of over 6 months and exposure history, we will obtain dedicated lung imaging with CT chest.  Concern for airway disease as well with a peripheral eosinophilia and to this end we will obtain PFT and region 6 panel.  Can continue albuterol as needed and we will consider maintenance therapy on follow-up.

## 2024-12-05 NOTE — PROGRESS NOTES
[facility]  Pulmonology  NEW VISIT     Patient Name:  Chano Clement  Primary Care Provider: Calista Wright FNP  Date of Service: 12/5/2024   Reason for Referral: R05.3 (ICD-10-CM) - Chronic cough       Chief Complaint: Chest congestion    SUBJECTIVE   HPI:  Chano Clement is a 73 y.o. male with RAYMUNDO on PAP, hypertension and chronic rhinitis who presents today upon referral with complaints of chest congestion.     Urbano reports having chest congestion that is intermittent. He also describes it as inability to take in a full breath. At times has a cough. He has had no respiratory symptoms warranting admission to hospital. Of note, required emergent hand surgery following trauma with unremarkable post-operative pulmonary course. PCP notes, reviewed, concern for cough starting after COVID infection. We reviewed his previous imaging to date.       Past Medical History:   Diagnosis Date    Anxiety disorder, unspecified     Chronic bilateral low back pain with left-sided sciatica 04/12/2022    Chronic rhinitis 04/12/2022    Depressive disorder     Diabetic eye exam 09/20/2023    Dr. Raymond [facility]    Essential (primary) hypertension     GERD (gastroesophageal reflux disease)     History of colon polyps 04/12/2022    Hyperlipidemia     Laceration of left wrist 11/21/2023    RAYMUNDO on CPAP     Prediabetes 04/19/2023    Lab Results Component Value Date  HGBA1C 6.3 04/19/2023      RLS (restless legs syndrome)     TIA (transient ischemic attack)        Past Surgical History:   Procedure Laterality Date    BACK SURGERY      Caudal HARPER  02/18/2013    Dr Rdz    FOOT SURGERY Left     Left l4-L5 TFESI Left 06/20/2012    Dr Rdz    MOLE REMOVAL      NERVE REPAIR Left 11/21/2023    Procedure: REPAIR, SUPERFICIAL BRANCH OF RADIAL   NERVE;  Surgeon: Val Clifton MD;  Location: [facility] OR;  Service: General;  Laterality: Left;    REPAIR OF TENDON OF UPPER EXTREMITY Left 11/21/2023     Procedure: REPAIR BRACHIORADIALIS TENDON;  Surgeon: Val Clifton MD;  Location: [facility] OR;  Service: General;  Laterality: Left;    REPAIR, ARTERY, RADIAL Left 2023    Procedure: REPAIR/LIGATION, ARTERY, RADIAL;  Surgeon: Val Clitfon MD;  Location: [facility] OR;  Service: General;  Laterality: Left;    SHOULDER ARTHROSCOPY Bilateral     T3-T4 HARPER  2012    Dr Rdz    TONSILLECTOMY      UVULECTOMY         Family History   Problem Relation Name Age of Onset    Heart disease Mother      Rheumatic fever Mother      Heart disease Father      Heart attack Father      No Known Problems Sister      Arthritis Sister      Cancer Brother          prostate ca    Cancer Brother          prostate ca    No Known Problems Maternal Grandmother      No Known Problems Maternal Grandfather      No Known Problems Paternal Grandmother      No Known Problems Paternal Grandfather      Hyperlipidemia Son      Hypertension Son          Social History     Socioeconomic History    Marital status:    Tobacco Use    Smoking status: Former     Current packs/day: 0.00     Average packs/day: 1 pack/day for 10.0 years (10.0 ttl pk-yrs)     Types: Cigarettes     Start date:      Quit date:      Years since quittin.9     Passive exposure: Never    Smokeless tobacco: Never    Tobacco comments:     Use to be a smoker, quit in . 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 Service within [place].  Hobbies include fishing, making fishing lures and hunting.     Social Drivers of Health     Financial Resource Strain: Low Risk  (2024)    Overall  "Financial Resource Strain (CARDIA)     Difficulty of Paying Living Expenses: Not very hard   Food Insecurity: No Food Insecurity (2/21/2024)    Hunger Vital Sign     Worried About Running Out of Food in the Last Year: Never true     Ran Out of Food in the Last Year: Never true   Transportation Needs: No Transportation Needs (2/21/2024)    PRAPARE - Transportation     Lack of Transportation (Medical): No     Lack of Transportation (Non-Medical): No   Physical Activity: Insufficiently Active (2/21/2024)    Exercise Vital Sign     Days of Exercise per Week: 2 days     Minutes of Exercise per Session: 30 min   Stress: No Stress Concern Present (2/21/2024)    [facility] Occupational [facility] - Occupational Stress Questionnaire     Feeling of Stress : Not at all   Housing Stability: Low Risk  (2/21/2024)    Housing Stability Vital Sign     Unable to Pay for Housing in the Last Year: No     Number of Places Lived in the Last Year: 1     Unstable Housing in the Last Year: No       Social History     Social History Narrative    Moved to [place] [facility] [place]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 Service within [place].  Hobbies include fishing, making fishing lures and hunting.       Review of patient's allergies indicates:  No Known Allergies     Medications: Medications reviewed to include over the counter medications.    Review of Systems: A focused ROS was completed and found to be negative except for that mentioned above.      OBJECTIVE   PHYSICAL EXAM:  Vitals:    12/05/24 1323   BP: 120/76   BP Location: Left arm   Patient Position: Sitting   Pulse: 77   Resp: 18   SpO2: (!) 94%   Weight: 110.6 kg (243 lb 12.8 oz)   Height: 5' 9" (1.753 m)        GENERAL: NAD  HEENT: normocephalic, non-icteric conjunctivae, moist oral mucosa  RESPIRATORY: clear to auscultation, no wheezing, rales or rhonchi  CARDIOVASCULAR: Regular rate and rhythm, no " murmurs rubs or gallops  SKIN:  Healing abrasions in bilateral distal UEs  MUSCULOSKELETAL: No clubbing or cyanosis; no pedal edema  NEUROLOGIC: AO ×3, no gross deficits    LABS:  Lab studies reviewed and notable for H/H 13.5/40.9, SEos 230; peak 1080, CO2 25, SCr 0.82 (11/2024)    IMAGING:  Imaging reviewed and notable for CXR 11/2024 plate like atelectasis and obscuring of R cardiophrenic border, no pleural effusion, no mass       LUNG FUNCTION TESTING: None available to review or report    ASSESSMENT & PLAN     1. Shortness of breath  -     Complete PFT w/ bronchodilator; Future  -     Resp Profile, [facility]; Future; Expected date: 12/05/2024  -     CT Chest Without Contrast; Future; Expected date: 12/05/2024    2. Chronic cough  Assessment & Plan:  74 yo M with remote smoking history and previous occupational exposures to jet fuel during service in the [employer] presents to establish care with history of chest congestion cough.  Labs notable for SEos peaking at 1080.  CXR reviewed and no dedicated CT imaging to date.  Given length of symptom of over 6 months and exposure history, we will obtain dedicated lung imaging with CT chest.  Concern for airway disease as well with a peripheral eosinophilia and to this end we will obtain PFT and region 6 panel.  Can continue albuterol as needed and we will consider maintenance therapy on follow-up.    Orders:  -     Ambulatory referral/consult to Pulmonology  -     Complete PFT w/ bronchodilator; Future  -     Resp Profile, [facility]; Future; Expected date: 12/05/2024  -     CT Chest Without Contrast; Future; Expected date: 12/05/2024    3. Chronic rhinitis  -     Resp Profile, [facility]; Future; Expected date: 12/05/2024           Follow up in about 5 weeks (around 1/9/2025) for Routine follow up.      Case was discussed with patient; all questions were answered to patient's satisfaction and patient verbalized understanding.       Esthela Jaramillo,  MD  Pulmonary Medicine  [facility]  Phone: [phone]

## 2024-12-16 ENCOUNTER — PATIENT MESSAGE (OUTPATIENT)
Dept: PULMONOLOGY | Facility: CLINIC | Age: 74
End: 2024-12-16
Payer: COMMERCIAL

## 2025-01-02 ENCOUNTER — OFFICE VISIT (OUTPATIENT)
Dept: FAMILY MEDICINE | Facility: CLINIC | Age: 75
End: 2025-01-02
Payer: COMMERCIAL

## 2025-01-02 VITALS
SYSTOLIC BLOOD PRESSURE: 138 MMHG | RESPIRATION RATE: 20 BRPM | HEIGHT: 70 IN | HEART RATE: 69 BPM | WEIGHT: 243 LBS | TEMPERATURE: 98 F | OXYGEN SATURATION: 96 % | DIASTOLIC BLOOD PRESSURE: 72 MMHG | BODY MASS INDEX: 34.79 KG/M2

## 2025-01-02 DIAGNOSIS — E29.1 TESTOSTERONE DEFICIENCY IN MALE: Primary | ICD-10-CM

## 2025-01-02 DIAGNOSIS — R53.83 OTHER FATIGUE: Chronic | ICD-10-CM

## 2025-01-02 DIAGNOSIS — F33.9 RECURRENT DEPRESSION: Chronic | ICD-10-CM

## 2025-01-02 DIAGNOSIS — G47.9 DIFFICULTY SLEEPING: Chronic | ICD-10-CM

## 2025-01-02 DIAGNOSIS — Z12.5 PROSTATE CANCER SCREENING: ICD-10-CM

## 2025-01-02 PROCEDURE — 3075F SYST BP GE 130 - 139MM HG: CPT | Mod: ,,, | Performed by: NURSE PRACTITIONER

## 2025-01-02 PROCEDURE — 1160F RVW MEDS BY RX/DR IN RCRD: CPT | Mod: ,,, | Performed by: NURSE PRACTITIONER

## 2025-01-02 PROCEDURE — 3288F FALL RISK ASSESSMENT DOCD: CPT | Mod: ,,, | Performed by: NURSE PRACTITIONER

## 2025-01-02 PROCEDURE — 99213 OFFICE O/P EST LOW 20 MIN: CPT | Mod: ,,, | Performed by: NURSE PRACTITIONER

## 2025-01-02 PROCEDURE — 1101F PT FALLS ASSESS-DOCD LE1/YR: CPT | Mod: ,,, | Performed by: NURSE PRACTITIONER

## 2025-01-02 PROCEDURE — 3078F DIAST BP <80 MM HG: CPT | Mod: ,,, | Performed by: NURSE PRACTITIONER

## 2025-01-02 PROCEDURE — 1159F MED LIST DOCD IN RCRD: CPT | Mod: ,,, | Performed by: NURSE PRACTITIONER

## 2025-01-02 RX ORDER — TRAZODONE HYDROCHLORIDE 50 MG/1
200 TABLET ORAL NIGHTLY
Qty: 360 TABLET | Refills: 3 | Status: SHIPPED | OUTPATIENT
Start: 2025-01-02 | End: 2026-01-02

## 2025-01-02 RX ORDER — TESTOSTERONE 20.25 MG/1.25G
2 GEL TOPICAL DAILY
Qty: 150 G | Refills: 0 | Status: SHIPPED | OUTPATIENT
Start: 2025-01-02 | End: 2025-04-02

## 2025-01-02 NOTE — ASSESSMENT & PLAN NOTE
Latest Reference Range & Units 11/21/24 10:57 12/03/24 08:38   Testosterone, Free 3.28 - 12.2 ng/dL 4.79 5.09   Testosterone, Total 240 - 950 ng/dL 142 (L) 151 (L)     PSA Total (ng/mL)   Date Value   02/21/2024 1.100     [facility] pharmacy covers/carries testosterone gel  Start AndroGel 2 pumps daily.  Recheck testosterone level lab only in 4 weeks.  F/u visit 3-mth

## 2025-01-02 NOTE — ASSESSMENT & PLAN NOTE
Is hopeful testosterone therapy will help his energy level and if not we will discuss whether to continue.

## 2025-01-02 NOTE — PROGRESS NOTES
[facility]  [address] [place] [address]  Phone: [phone]  Fax: [phone]       PATIENT NAME: Chano Clement   : 1950    AGE: 74 y.o. DATE OF ENCOUNTER: 25    MRN: [id number]      PCP: Calista Wright FNP    Subjective:     Reason for Visit / Chief Complaint:     274}  Chief Complaint   Patient presents with    Follow-up    Results     Lab results follow p     History of Present Illness    HPI:  Patient has been diagnosed with testosterone deficiency, confirmed by two separate labs. The practitioner notes this is not uncommon for the patient's age. He reports fatigue affecting his ability to engage in previously enjoyed outdoor activities like hunting and fishing. Patient expresses a desire to regain some of his previous energy and drive.    Patient states his libido is adequate and possibly slightly higher than desired. He denies issues with maintaining an erection. He and his wife have not engaged in sexual intercourse in approximately 8 years due to complications from his wife's surgery for what he believes was a rectocele. Patient denies wanting an increase in sex drive.      ROS:  Constitutional: +fatigue  Male Genitourinary: -erectile dysfunction         Allergies and Meds: 274}     Review of patient's allergies indicates:  No Known Allergies     Current Outpatient Medications   Medication Sig Dispense Refill    albuterol (VENTOLIN HFA) 90 mcg/actuation inhaler Inhale 2 puffs into the lungs every 4 (four) hours as needed for Wheezing or Shortness of Breath. Rescue 18 g 1    alfuzosin (UROXATRAL) 10 mg Tb24 Take 1 tablet (10 mg total) by mouth daily with breakfast. 90 tablet 3    aspirin (ECOTRIN) 325 MG EC tablet Take 325 mg by mouth once daily. Takes on Monday, Wednesday, and Friday      cetirizine (ZYRTEC) 10 MG tablet Take 1 tablet (10 mg total) by mouth once daily. 90 tablet 3    cyclobenzaprine (FLEXERIL) 10 MG tablet Take 1 tablet (10  mg total) by mouth nightly as needed for Muscle spasms. 90 tablet 1    diclofenac sodium (VOLTAREN) 1 % Gel Apply to back four times daily as needed for pain 900 g 5    DULoxetine (CYMBALTA) 60 MG capsule Take 1 capsule (60 mg total) by mouth 2 (two) times daily. 180 capsule 3    gabapentin (NEURONTIN) 600 MG tablet Take 2 tablets (1,200 mg total) by mouth 2 (two) times daily. 360 tablet 1    HYDROcodone-acetaminophen (NORCO)  mg per tablet Take 1 tablet by mouth 3 (three) times daily as needed for Pain. 90 tablet 0    lisinopriL (PRINIVIL,ZESTRIL) 40 MG tablet Take 1 tablet (40 mg total) by mouth once daily. 90 tablet 3    metFORMIN (GLUCOPHAGE-XR) 500 MG ER 24hr tablet Take 2 tablets (1,000 mg total) by mouth Daily. 180 tablet 3    multivitamin (THERAGRAN) per tablet Take 1 tablet by mouth once daily.      NARCAN 4 mg/actuation Spry 1 spray by Nasal route daily as needed.      omega-3 fatty acids/fish oil (FISH OIL-OMEGA-3 FATTY ACIDS) 300-1,000 mg capsule Take 1 capsule by mouth once daily.      pramipexole (MIRAPEX) 0.25 MG tablet Take 1 tablet (0.25 mg total) by mouth every evening. 90 tablet 3    rosuvastatin (CRESTOR) 20 MG tablet Take 1 tablet (20 mg total) by mouth once daily. 90 tablet 3    traZODone (DESYREL) 50 MG tablet Take 4 tablets (200 mg total) by mouth every evening. 360 tablet 3    testosterone (ANDROGEL) 20.25 mg/1.25 gram (1.62 %) GlPm Place 2 Pump  onto the skin Daily. Apply topically to shoulders or upper arms, not to genitals or abdomen, each morning 150 g 0     No current facility-administered medications for this visit.       Labs:274}   I have reviewed labs below:    Lab Results   Component Value Date    WBC 10.93 11/21/2024    RBC 4.51 (L) 11/21/2024    HGB 13.5 11/21/2024    HCT 40.9 11/21/2024     11/21/2024     11/21/2024    K 4.4 11/21/2024     11/21/2024    CALCIUM 9.0 11/21/2024     11/21/2024    BUN 17 11/21/2024    CREATININE 0.82 11/21/2024     EGFRNONAA 80 2022    ALT 13 2024    AST 17 2024    CHOL 140 2024    TRIG 60 2024    HDL 63 (H) 2024    LDLCALC 65 2024    TSH 1.033 2024    PSA 1.100 2024    HGBA1C 5.9 2024    MICROALBUR 0.5 2023     Medical History: 274}     Past Medical History:   Diagnosis Date    Anxiety disorder, unspecified     Chronic bilateral low back pain with left-sided sciatica 2022    Chronic rhinitis 2022    Depressive disorder     Diabetic eye exam 2023    Dr. Raymond [facility]    Essential (primary) hypertension     GERD (gastroesophageal reflux disease)     History of colon polyps 2022    Hyperlipidemia     Laceration of left wrist 2023    RAYMUNDO on CPAP     Prediabetes 2023    Lab Results Component Value Date  HGBA1C 6.3 2023      RLS (restless legs syndrome)     TIA (transient ischemic attack)       Social History     Tobacco Use   Smoking Status Former    Current packs/day: 0.00    Average packs/day: 1 pack/day for 10.0 years (10.0 ttl pk-yrs)    Types: Cigarettes    Start date:     Quit date:     Years since quittin.0    Passive exposure: Never   Smokeless Tobacco Never   Tobacco Comments    Use to be a smoker, quit in . Stated he smoked for about 10 years      Past Surgical History:   Procedure Laterality Date    BACK SURGERY      Caudal HARPER  2013    Dr Rdz    FOOT SURGERY Left     Left l4-L5 TFESI Left 2012    Dr Rdz    MOLE REMOVAL      NERVE REPAIR Left 2023    Procedure: REPAIR, SUPERFICIAL BRANCH OF RADIAL   NERVE;  Surgeon: Val Clifton MD;  Location: [facility];  Service: General;  Laterality: Left;    REPAIR OF TENDON OF UPPER EXTREMITY Left 2023    Procedure: REPAIR BRACHIORADIALIS TENDON;  Surgeon: Val Clifton MD;  Location: [facility];  Service: General;  Laterality: Left;    REPAIR, ARTERY, RADIAL Left 2023    Procedure:  "REPAIR/LIGATION, ARTERY, RADIAL;  Surgeon: Val Clifton MD;  Location: [facility];  Service: General;  Laterality: Left;    SHOULDER ARTHROSCOPY Bilateral     T3-T4 HARPER  02/27/2012    Dr Rdz    TONSILLECTOMY      UVULECTOMY          Health Maintenance:      Health Maintenance Topics with due status: Not Due       Topic Last Completion Date    Colorectal Cancer Screening 05/10/2021    TETANUS VACCINE 11/21/2023    PROSTATE-SPECIFIC ANTIGEN 02/21/2024    Lipid Panel 05/20/2024    High Dose Statin 11/21/2024    Aspirin/Antiplatelet Therapy 11/21/2024    Hemoglobin A1c (Prediabetes) 11/21/2024       Objective:  274}   Vital Signs  Temp: 97.8 °F (36.6 °C)  Temp Source: Oral  Pulse: 69  Resp: 20  SpO2: 96 %  BP: 138/72  BP Location: Left arm  Patient Position: Sitting  Height and Weight  Height: 5' 10" (177.8 cm)  Weight: 110.2 kg (243 lb)  BSA (Calculated - sq m): 2.33 sq meters  BMI (Calculated): 34.9  Weight in (lb) to have BMI = 25: 173.9    Over the last two weeks how often have you been bothered by little interest or pleasure in doing things: 0  Over the last two weeks how often have you been bothered by feeling down, depressed or hopeless: 0  PHQ-2 Total Score: 0  PHQ-9 Score: 0  PHQ-9 Interpretation: Minimal or None    Wt Readings from Last 3 Encounters:   01/02/25 110.2 kg (243 lb)   12/05/24 110.6 kg (243 lb 12.8 oz)   11/21/24 107.9 kg (237 lb 12.8 oz)     Physical Exam  Vitals and nursing note reviewed.   Constitutional:       General: He is not in acute distress.     Appearance: Normal appearance. He is not ill-appearing.   Cardiovascular:      Rate and Rhythm: Normal rate.   Pulmonary:      Effort: Pulmonary effort is normal. No respiratory distress.   Skin:     General: Skin is warm and dry.      Coloration: Skin is not jaundiced or pale.   Neurological:      Mental Status: He is alert and oriented to person, place, and time.      Gait: Gait normal.   Psychiatric:         Mood and Affect: Mood " normal.         Behavior: Behavior normal.         Thought Content: Thought content normal.         Judgment: Judgment normal.          Assessment and Plan: 274}       1. Testosterone deficiency in male  Assessment & Plan:   Latest Reference Range & Units 11/21/24 10:57 12/03/24 08:38   Testosterone, Free 3.28 - 12.2 ng/dL 4.79 5.09   Testosterone, Total 240 - 950 ng/dL 142 (L) 151 (L)     PSA Total (ng/mL)   Date Value   02/21/2024 1.100     [facility] pharmacy covers/carries testosterone gel  Start AndroGel 2 pumps daily.  Recheck testosterone level lab only in 4 weeks.  F/u visit 3-mth      Orders:  -     testosterone (ANDROGEL) 20.25 mg/1.25 gram (1.62 %) GlPm; Place 2 Pump  onto the skin Daily. Apply topically to shoulders or upper arms, not to genitals or abdomen, each morning  Dispense: 150 g; Refill: 0  -     TESTOSTERONE, FREE (DIALYSIS) AND TOTAL, LC/MS/MS; Future; Expected date: 01/30/2025  -     PSA, Screening; Future; Expected date: 01/30/2025    2. Other fatigue  Assessment & Plan:  Is hopeful testosterone therapy will help his energy level and if not we will discuss whether to continue.      3. Prostate cancer screening  -     PSA, Screening; Future; Expected date: 01/30/2025        Assessment & Plan    IMPRESSION:  - Confirmed testosterone deficiency based on 2 separate levels  - Recommend topical testosterone treatment (AndroGel) over injections due to more consistent levels and long-term benefits  - Aim to increase testosterone level to around 600 for optimal benefits    TESTOSTERONE DEFICIENCY:  - Confirmed testosterone deficiency through two separate testosterone level tests.  - Evaluated current testosterone level, which is low.  - Set a target level of approximately 600.  - Prescribed AndroGel, starting with 2 pumps daily applied to upper arms or upper back.  - Instructed the patient on proper application technique: - Apply to upper arms or upper back - Allow to dry for 1 minute before dressing - Wash  hands thoroughly after application - Avoid application to abdomen or genitals  - Advised on precautions to avoid transfer to others.  - Explained rationale for topical testosterone treatment over injections.  - Discussed potential benefits of testosterone therapy, including improved bone health, energy levels, cardiovascular health, and potentially increased libido.  - Informed the patient about possible increase in libido as a side effect of treatment.  - Ordered testosterone level test to be done in 4 weeks, between 8-10 AM, before applying AndroGel that day.  - May increase dosage to 3 or 4 pumps based on response.    FOLLOW UP:  - Lab only follow up in 4 weeks (around January 30th) for testosterone level test.  - Planned reassessment of treatment effectiveness in 3 months to adjust as needed.  - Instructed the patient to contact the office if any issues or concerns arise with the treatment.       Follow up in about 3 months (around 4/2/2025) for TRT.    Signature:  ARGENTINA West-BC    Future Appointments   Date Time Provider Department Center   1/22/2025 10:00 AM [facility] CT1 [facility] CTIC [facility] [place]   1/22/2025  2:00 PM RESPIRATORY THERAPY, [facility] PULM FUNCTION SERVICES [facility]   1/22/2025  3:00 PM Esthela Jaramillo MD [facility]  PULM [facility]   2/26/2025 11:00 AM AWV NURSE, [facility] FAMILY MEDICINE [facility] DUC [place]   5/26/2025 10:40 AM Calista Wright FNP [facility] D[facility] [place]     This note was generated with the assistance of ambient listening technology. Verbal consent was obtained by the patient and accompanying visitor(s) for the recording of patient appointment to facilitate this note. I attest to having reviewed and edited the generated note for accuracy, though some syntax or spelling errors may persist. Please contact the author of this note for any clarification.

## 2025-01-22 ENCOUNTER — OFFICE VISIT (OUTPATIENT)
Dept: PULMONOLOGY | Facility: CLINIC | Age: 75
End: 2025-01-22
Payer: COMMERCIAL

## 2025-01-22 ENCOUNTER — HOSPITAL ENCOUNTER (OUTPATIENT)
Dept: RADIOLOGY | Facility: HOSPITAL | Age: 75
Discharge: HOME | End: 2025-01-22
Attending: STUDENT IN AN ORGANIZED HEALTH CARE EDUCATION/TRAINING PROGRAM
Payer: COMMERCIAL

## 2025-01-22 ENCOUNTER — CLINICAL SUPPORT (OUTPATIENT)
Dept: PULMONOLOGY | Facility: HOSPITAL | Age: 75
End: 2025-01-22
Attending: STUDENT IN AN ORGANIZED HEALTH CARE EDUCATION/TRAINING PROGRAM
Payer: COMMERCIAL

## 2025-01-22 VITALS
HEIGHT: 70 IN | WEIGHT: 242.5 LBS | HEART RATE: 83 BPM | DIASTOLIC BLOOD PRESSURE: 72 MMHG | BODY MASS INDEX: 34.72 KG/M2 | SYSTOLIC BLOOD PRESSURE: 132 MMHG | OXYGEN SATURATION: 96 % | RESPIRATION RATE: 16 BRPM

## 2025-01-22 VITALS — OXYGEN SATURATION: 97 %

## 2025-01-22 DIAGNOSIS — R05.3 CHRONIC COUGH: ICD-10-CM

## 2025-01-22 DIAGNOSIS — J45.20 MILD INTERMITTENT ASTHMA WITHOUT COMPLICATION: Primary | ICD-10-CM

## 2025-01-22 DIAGNOSIS — R06.02 SHORTNESS OF BREATH: ICD-10-CM

## 2025-01-22 PROCEDURE — 3288F FALL RISK ASSESSMENT DOCD: CPT | Mod: CPTII,,, | Performed by: STUDENT IN AN ORGANIZED HEALTH CARE EDUCATION/TRAINING PROGRAM

## 2025-01-22 PROCEDURE — 99215 OFFICE O/P EST HI 40 MIN: CPT | Mod: PBBFAC,25 | Performed by: STUDENT IN AN ORGANIZED HEALTH CARE EDUCATION/TRAINING PROGRAM

## 2025-01-22 PROCEDURE — 71250 CT THORAX DX C-: CPT | Mod: TC

## 2025-01-22 PROCEDURE — 1159F MED LIST DOCD IN RCRD: CPT | Mod: CPTII,,, | Performed by: STUDENT IN AN ORGANIZED HEALTH CARE EDUCATION/TRAINING PROGRAM

## 2025-01-22 PROCEDURE — 94060 EVALUATION OF WHEEZING: CPT

## 2025-01-22 PROCEDURE — 94729 DIFFUSING CAPACITY: CPT

## 2025-01-22 PROCEDURE — 27100098 HC SPACER

## 2025-01-22 PROCEDURE — 99214 OFFICE O/P EST MOD 30 MIN: CPT | Mod: 25,S$PBB,, | Performed by: STUDENT IN AN ORGANIZED HEALTH CARE EDUCATION/TRAINING PROGRAM

## 2025-01-22 PROCEDURE — 3078F DIAST BP <80 MM HG: CPT | Mod: CPTII,,, | Performed by: STUDENT IN AN ORGANIZED HEALTH CARE EDUCATION/TRAINING PROGRAM

## 2025-01-22 PROCEDURE — 1101F PT FALLS ASSESS-DOCD LE1/YR: CPT | Mod: CPTII,,, | Performed by: STUDENT IN AN ORGANIZED HEALTH CARE EDUCATION/TRAINING PROGRAM

## 2025-01-22 PROCEDURE — 1160F RVW MEDS BY RX/DR IN RCRD: CPT | Mod: CPTII,,, | Performed by: STUDENT IN AN ORGANIZED HEALTH CARE EDUCATION/TRAINING PROGRAM

## 2025-01-22 PROCEDURE — 99999 PR PBB SHADOW E&M-EST. PATIENT-LVL V: CPT | Mod: PBBFAC,,, | Performed by: STUDENT IN AN ORGANIZED HEALTH CARE EDUCATION/TRAINING PROGRAM

## 2025-01-22 PROCEDURE — 1125F AMNT PAIN NOTED PAIN PRSNT: CPT | Mod: CPTII,,, | Performed by: STUDENT IN AN ORGANIZED HEALTH CARE EDUCATION/TRAINING PROGRAM

## 2025-01-22 PROCEDURE — 3075F SYST BP GE 130 - 139MM HG: CPT | Mod: CPTII,,, | Performed by: STUDENT IN AN ORGANIZED HEALTH CARE EDUCATION/TRAINING PROGRAM

## 2025-01-22 PROCEDURE — 94726 PLETHYSMOGRAPHY LUNG VOLUMES: CPT

## 2025-01-22 NOTE — ASSESSMENT & PLAN NOTE
73 yo M with remote smoking history and previous occupational exposures to jet fuel during service in the [employer] presents to establish care with history of chest congestion and cough.  Labs notable for SEos peaking at 1080 during exacerbations. Work up has included PFT notable for gas trapping and CT Chest with no evidence of airway disease or parenchymal disease. Symptoms and work up is consistent with a mild and inducible airway disease w/ peripheral eosinophilia that is intermittent while IgE remains wnl. Plan for management with as needed albuterol.  Patient was educated to contact us if he has worsening of his breathing due to risk of inducible airway inflammation at which time we will consider outpatient prednisone versus presentation to ED.

## 2025-01-22 NOTE — PATIENT INSTRUCTIONS
LIGHTKEEPER PRO FOR FÉLIX LIGHTS      You have normal lung function. You have intermittent asthma that is related to a trigger.

## 2025-01-22 NOTE — PROGRESS NOTES
[facility]  Pulmonology  ESTABLISHED VISIT     Patient Name:  Chano Clement  Primary Care Provider: Calista Wright FNP  Date of Service: 1/22/2025   Reason for Referral: R05.3 (ICD-10-CM) - Chronic cough       Chief Complaint: Chest congestion    SUBJECTIVE   HPI:  Chano Clement is a 74 y.o. male with RAYMUNDO on PAP, hypertension and chronic rhinitis who presents today for follow-up of chest congestion.  Last seen 12/2024 with plan for PFT, CT chest and region 6 panel.    Urbano reports feeling well on this assessment.  He has had no worsening of his breathing that has required ED visit or hospitalization.  He had some tiredness with his breathing test, however, overall feels well.  We reviewed the results of his PFT and CT chest.    Initial HPI  Urbano reports having chest congestion that is intermittent. He also describes it as inability to take in a full breath. At times has a cough. He has had no respiratory symptoms warranting admission to hospital. Of note, required emergent hand surgery following trauma with unremarkable post-operative pulmonary course. PCP notes, reviewed, concern for cough starting after COVID infection. We reviewed his previous imaging to date.       Past Medical History:   Diagnosis Date    Anxiety disorder, unspecified     Chronic bilateral low back pain with left-sided sciatica 04/12/2022    Chronic rhinitis 04/12/2022    Depressive disorder     Diabetic eye exam 09/20/2023    Dr. [facility]    Essential (primary) hypertension     GERD (gastroesophageal reflux disease)     History of colon polyps 04/12/2022    Hyperlipidemia     Laceration of left wrist 11/21/2023    RAYMUNDO on CPAP     Prediabetes 04/19/2023    Lab Results Component Value Date  HGBA1C 6.3 04/19/2023      RLS (restless legs syndrome)     TIA (transient ischemic attack)        Past Surgical History:   Procedure Laterality Date    BACK SURGERY      Caudal HARPER  02/18/2013    Dr Rdz     FOOT SURGERY Left     Left l4-L5 TFESI Left 2012    Dr Rdz    MOLE REMOVAL      NERVE REPAIR Left 2023    Procedure: REPAIR, SUPERFICIAL BRANCH OF RADIAL   NERVE;  Surgeon: Val Clifton MD;  Location: [facility];  Service: General;  Laterality: Left;    REPAIR OF TENDON OF UPPER EXTREMITY Left 2023    Procedure: REPAIR BRACHIORADIALIS TENDON;  Surgeon: Val Clifton MD;  Location: [facility] OR;  Service: General;  Laterality: Left;    REPAIR, ARTERY, RADIAL Left 2023    Procedure: REPAIR/LIGATION, ARTERY, RADIAL;  Surgeon: Val Clifton MD;  Location: [facility] OR;  Service: General;  Laterality: Left;    SHOULDER ARTHROSCOPY Bilateral     T3-T4 HARPER  2012    Dr Rdz    TONSILLECTOMY      UVULECTOMY         Family History   Problem Relation Name Age of Onset    Heart disease Mother      Rheumatic fever Mother      Heart disease Father      Heart attack Father      No Known Problems Sister      Arthritis Sister      Cancer Brother          prostate ca    Cancer Brother          prostate ca    No Known Problems Maternal Grandmother      No Known Problems Maternal Grandfather      No Known Problems Paternal Grandmother      No Known Problems Paternal Grandfather      Hyperlipidemia Son      Hypertension Son          Social History     Socioeconomic History    Marital status:    Tobacco Use    Smoking status: Former     Current packs/day: 0.00     Average packs/day: 1 pack/day for 10.0 years (10.0 ttl pk-yrs)     Types: Cigarettes     Start date:      Quit date:      Years since quittin.0     Passive exposure: Never    Smokeless tobacco: Never    Tobacco comments:     Use to be a smoker, quit in . 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 Service within [place].  Hobbies include fishing, making fishing lures and hunting.     Social Drivers of Health     Financial Resource Strain: Low Risk  (2/21/2024)    Overall Financial Resource Strain (CARDIA)     Difficulty of Paying Living Expenses: Not very hard   Food Insecurity: No Food Insecurity (2/21/2024)    Hunger Vital Sign     Worried About Running Out of Food in the Last Year: Never true     Ran Out of Food in the Last Year: Never true   Transportation Needs: No Transportation Needs (2/21/2024)    PRAPARE - Transportation     Lack of Transportation (Medical): No     Lack of Transportation (Non-Medical): No   Physical Activity: Insufficiently Active (2/21/2024)    Exercise Vital Sign     Days of Exercise per Week: 2 days     Minutes of Exercise per Session: 30 min   Stress: No Stress Concern Present (2/21/2024)    [employer] [place] [employer] - Occupational Stress Questionnaire     Feeling of Stress : Not at all   Housing Stability: Low Risk  (2/21/2024)    Housing Stability Vital Sign     Unable to Pay for Housing in the Last Year: No     Number of Places Lived in the Last Year: 1     Unstable Housing in the Last Year: No       Social History     Social History Narrative    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 Service within [place].  Hobbies include fishing, making fishing lures and hunting.       Review of patient's allergies indicates:  No Known Allergies     Medications: Medications reviewed to include over the counter medications.    Review of Systems: A focused ROS was completed and found to be negative except for that mentioned above.      OBJECTIVE   PHYSICAL EXAM:  Vitals:    01/22/25 1448   BP: 132/72   BP Location: Left  "arm   Patient Position: Sitting   Pulse: 83   Resp: 16   SpO2: 96%   Weight: 110 kg (242 lb 8.1 oz)   Height: 5' 10" (1.778 m)        GENERAL: NAD  HEENT: normocephalic, non-icteric conjunctivae, moist oral mucosa  RESPIRATORY:  Normal pulmonary effort, on room air  MUSCULOSKELETAL: No clubbing or cyanosis; no pedal edema  NEUROLOGIC: AO ×3, no gross deficits    LABS:  Lab studies reviewed and notable for H/H 13.5/40.9, SEos 230; peak 1080, CO2 25, SCr 0.82 (11/2024) Negative region 6 panel, IgE 84.9 (12/2024)    IMAGING:  Imaging reviewed and notable for CXR 11/2024 plate like atelectasis and obscuring of R cardiophrenic border, no pleural effusion, no mass.  CT chest 01/2025 with clear lung fields save for small 0.3-0.4 cm nodules which are calcified, coronary and aortic calcifications present, no pleural effusion, no lymphadenopathy       LUNG FUNCTION TESTING:   SPIROMETRY/PFT:  01/2025 Pre Post   FVC 4.69/0.92 4.41/0.51   FEV1 3.67/1.22 3.58/1.04   FEV1/FVC 78/0.38 81/0.77   TLC 8.40/1.37    FRC  5.19/1.58    RV 3.69/2.45    DLCO 25.49/0.06      ASSESSMENT & PLAN     1. Mild intermittent asthma without complication  Assessment & Plan:  73 yo M with remote smoking history and previous occupational exposures to jet fuel during service in the [employer] presents to establish care with history of chest congestion and cough.  Labs notable for SEos peaking at 1080 during exacerbations. Work up has included PFT notable for gas trapping and CT Chest with no evidence of airway disease or parenchymal disease. Symptoms and work up is consistent with a mild and inducible airway disease w/ peripheral eosinophilia that is intermittent while IgE remains wnl. Plan for management with as needed albuterol.  Patient was educated to contact us if he has worsening of his breathing due to risk of inducible airway inflammation at which time we will consider outpatient prednisone versus presentation to ED.        Follow up in about 1 year " (around 1/22/2026) for Routine follow up.      Case was discussed with patient; all questions were answered to patient's satisfaction and patient verbalized understanding.       Esthela Jaramillo MD  Pulmonary Medicine  [facility]  Phone: [phone]

## 2025-01-23 DIAGNOSIS — R52 PAIN: ICD-10-CM

## 2025-01-23 RX ORDER — DICLOFENAC SODIUM 10 MG/G
GEL TOPICAL
Qty: 900 G | Refills: 11 | Status: SHIPPED | OUTPATIENT
Start: 2025-01-23 | End: 2025-01-27 | Stop reason: SDUPTHER

## 2025-01-23 NOTE — TELEPHONE ENCOUNTER
----- Message from Liz sent at 1/22/2025  4:21 PM CST -----  VOLTAREN sent to [facility]  not the [facility]

## 2025-01-24 PROCEDURE — 94060 EVALUATION OF WHEEZING: CPT | Mod: 26,,, | Performed by: STUDENT IN AN ORGANIZED HEALTH CARE EDUCATION/TRAINING PROGRAM

## 2025-01-24 PROCEDURE — 94729 DIFFUSING CAPACITY: CPT | Mod: 26,,, | Performed by: STUDENT IN AN ORGANIZED HEALTH CARE EDUCATION/TRAINING PROGRAM

## 2025-01-24 PROCEDURE — 94726 PLETHYSMOGRAPHY LUNG VOLUMES: CPT | Mod: 26,,, | Performed by: STUDENT IN AN ORGANIZED HEALTH CARE EDUCATION/TRAINING PROGRAM

## 2025-01-24 NOTE — PROCEDURES
PFT REPORT:  SPIROMETRY:  The pre-BD FVC is normal, 4.69L/0.92 Z score.  The pre-BD FEV1 is normal, 3.67L/1.22 Z score.  Thre pre-BD FEV1/FVC ratio is 78/0.38 Z score.    The post-BD FVC is normal, 4.41L/0.51 Z score.  The post-BD FEV1 is normal, 3.58L/1.04 Z score.  The post-BD FEV1/FVC ratio is 81/0.77 Z score.    The is no significant bronchodilator effect.  The flow volume loop is normal.    LUNG VOLUMES:  The TLC is normal, 8.40L/1.37 Z score.  The FRC is normal, 5.19L/1.58 Z score.  The RV is increased, 3.69L/2.45 Z score.    DIFFUSION CAPACITY:  The diffusion capacity is not corrected for hemoglobin and is normal, 25.49/0.06 Z score.    Interpretation:  Spirometry is normal.  Lung volumes are normal. Gas trapping present.  Diffusion capacity is normal.  Normal PFTs.     Esthela Jaramillo MD  Pulmonary Medicine  [facility]

## 2025-01-27 DIAGNOSIS — R52 PAIN: ICD-10-CM

## 2025-01-27 RX ORDER — DICLOFENAC SODIUM 10 MG/G
GEL TOPICAL
Qty: 900 G | Refills: 11 | Status: SHIPPED | OUTPATIENT
Start: 2025-01-27

## 2025-01-27 NOTE — TELEPHONE ENCOUNTER
----- Message from Liz sent at 1/27/2025  8:35 AM CST -----  VOLTAREN1 % Gel sent to  [facility] because [facility] dont have it

## 2025-02-10 DIAGNOSIS — E29.1 TESTOSTERONE DEFICIENCY IN MALE: ICD-10-CM

## 2025-02-10 RX ORDER — TESTOSTERONE 20.25 MG/1.25G
2 GEL TOPICAL DAILY
Qty: 150 G | Refills: 0 | Status: SHIPPED | OUTPATIENT
Start: 2025-02-10 | End: 2025-03-03

## 2025-02-10 NOTE — TELEPHONE ENCOUNTER
----- Message from Karen sent at 2/10/2025 12:38 PM CST -----  Pt called and stated that the meds was ordered wrong and needs a call back [phone]

## 2025-02-24 NOTE — PROGRESS NOTES
[facility]  [address] [place] [address]  Phone: [phone]  Fax: [phone]     PATIENT NAME: Chano Clement   : 1950    AGE: 74 y.o. DATE OF ENCOUNTER: 25    Provider:    MRN: [id number]      Chano Clement presented for a follow-up Medicare AWV today. The following components were reviewed and updated:    Medical history  Family History  Social history  Allergies and Current Medications  Health Risk Assessment  Health Maintenance  Care Team    **See Completed Assessments for Annual Wellness visit with in the encounter summary    The following assessments were completed:  Depression Screening  Cognitive function Screening  Timed Get Up Test  Whisper Test      Opioid documentation:      Patient does have a current opioid prescription.      Patient accepted further discussion regarding opioid medication use.      Patient is currently taking hydrocodone narcotic for back pain.        Pain level today is 7/10.       In addition to narcotic pain medications, patient is also using acetaminophen for pain control.       Patient is followed by a specialist currently for their pain and will not be referred today.       Patient's opioid risk potential based on ORT-OUD tool:       Irving each box that applies   No   Yes     Family history of substance abuse   Alcohol [x] []   Illegal drugs [x] []   Rx drugs [x] []     Personal history of substance abuse   Alcohol [x] []   Illegal drugs [x] []   Rx drugs [] [x]     Age between 16-45 years   [x]   []     Patient with ADD, OCD, Bipolar disorder, schizoprenia   [x]   []     Patient with depression   []   [x]                         Scoring total                        2                                         Non-opioid treatment options have been discussed today and added to the patient's after visit summary.          Vitals:    25 1058   BP: 122/70   BP Location: Right arm   Patient Position: Sitting   Pulse: 77  "  Resp: 16   Temp: 98.6 °F (37 °C)   TempSrc: Oral   SpO2: 95%   Weight: 109.3 kg (241 lb)   Height: 5' 9" (1.753 m)     Body mass index is 35.59 kg/m².     Physical Exam  Vitals and nursing note reviewed.   Constitutional:       General: He is not in acute distress.     Appearance: Normal appearance. He is obese. He is not ill-appearing.   HENT:      Head: Normocephalic.   Eyes:      Conjunctiva/sclera: Conjunctivae normal.   Cardiovascular:      Rate and Rhythm: Normal rate and regular rhythm.      Heart sounds: Normal heart sounds.   Pulmonary:      Effort: Pulmonary effort is normal. No respiratory distress.      Breath sounds: Normal breath sounds.   Skin:     General: Skin is warm and dry.      Coloration: Skin is not jaundiced or pale.   Neurological:      Mental Status: He is alert and oriented to person, place, and time.      Gait: Gait normal.   Psychiatric:         Mood and Affect: Mood normal.         Behavior: Behavior normal.         Thought Content: Thought content normal.         Judgment: Judgment normal.           Diagnoses and health risks identified today and associated recommendations/orders:  1. Encounter for subsequent annual wellness visit (AWV) in Medicare patient    2. Essential (primary) hypertension  Assessment & Plan:  Controlled  Continue lisinopril 40 mg daily.      3. Neuropathy  Assessment & Plan:  Chronic, stable  Continue Gabapentin      4. Mild intermittent asthma without complication  Assessment & Plan:  Stable, mild with use of albuterol on rare occasion.  Followed by pulmonology, Dr. Esthela Jaramillo      5. Prediabetes  Overview:  Dx w/ prediabetes April 2023 with A1c 6.3%.      Assessment & Plan:  Lab Results   Component Value Date    HGBA1C 5.9 11/21/2024    HGBA1C 6.3 05/20/2024    HGBA1C 6.3 11/14/2023     Stable, last A1c improved with lifestyle changes and weight loss.      6. Other hyperlipidemia  Assessment & Plan:  Lab Results   Component Value Date    CHOL 140 05/20/2024    " CHOL 134 11/14/2023    CHOL 187 04/19/2023     Lab Results   Component Value Date    HDL 63 (H) 05/20/2024    HDL 65 (H) 11/14/2023    HDL 72 (H) 04/19/2023     Lab Results   Component Value Date    LDLCALC 65 05/20/2024    LDLCALC 58 11/14/2023    LDLCALC 108 04/19/2023     Lab Results   Component Value Date    TRIG 60 05/20/2024    TRIG 57 11/14/2023    TRIG 37 04/19/2023       Lab Results   Component Value Date    CHOLHDL 2.2 05/20/2024    CHOLHDL 2.1 11/14/2023    CHOLHDL 2.6 04/19/2023     Controlled, continue atorvastatin 40 mg daily.      7. Other reduced mobility  Assessment & Plan:  Stable, related to aging, chronic low back pain, and chronic joint pain  Fall precautions.      8. Prostate cancer screening  -     PSA, Screening    9. Testosterone deficiency in male  Assessment & Plan:   Latest Reference Range & Units 11/21/24 10:57 12/03/24 08:38   Testosterone, Free 3.28 - 12.2 ng/dL 4.79 5.09   Testosterone, Total 240 - 950 ng/dL 142 (L) 151 (L)     PSA Total (ng/mL)   Date Value   02/21/2024 1.100     Were previously told that the [facility] pharmacy covers/carries testosterone gel but was unable to get it there so the [facility] and he still has not received the prescription.    Treatment has not been started with AndroGel 2 pumps daily.  Plan to recheck testosterone level lab only 4 weeks after starting TRT.    He is questioning whether to take TRT at all.  Long discussion on risk versus benefit.    Orders:  -     PSA, Screening    10. Severe obesity (BMI 35.0-35.9 with comorbidity)  Assessment & Plan:  Body mass index is 35.59 kg/m². Morbid obesity complicates all aspects of disease management from diagnostic modalities to treatment. Weight loss encouraged and health benefits explained to patient.               Provided Chano with a 5-10 year written screening schedule and personal prevention plan. Recommendations were developed using the USPSTF age appropriate recommendations.  Education, counseling, and referrals were provided as needed.  After Visit Summary printed and given to patient which includes a list of additional screenings\tests needed.      Follow up in about 1 year (around 2/25/2026) for Medicare AWV, and otherwise follow-up as routinely scheduled, and follow-up as needed.    Signature:  ARGENTINA West-BC    Future Appointments   Date Time Provider Department Center   5/26/2025 10:40 AM Calista Wright FNP [facility] [place]   1/22/2026  1:40 PM Esthela Jaramillo MD [facility]   2/26/2026  8:00 AM AWV NURSE, [facility] [place]       Covid vaccine - declined, Shingles vaccine - declined, RSV vaccine - declined, PSA level - ordered 1/2/25 .    I offered to discuss advanced care planning, including how to pick a person who would make decisions for you if you were unable to make them for yourself, called a health care power of , and what kind of decisions you might make such as use of life sustaining treatments such as ventilators and tube feeding when faced with a life limiting illness recorded on a living will that they will need to know. (How you want to be cared for as you near the end of your natural life)     X  Patient is unwilling to engage in a discussion regarding advance directives at this time.

## 2025-02-25 ENCOUNTER — OFFICE VISIT (OUTPATIENT)
Dept: FAMILY MEDICINE | Facility: CLINIC | Age: 75
End: 2025-02-25
Payer: COMMERCIAL

## 2025-02-25 ENCOUNTER — RESULTS FOLLOW-UP (OUTPATIENT)
Dept: FAMILY MEDICINE | Facility: CLINIC | Age: 75
End: 2025-02-25

## 2025-02-25 VITALS
DIASTOLIC BLOOD PRESSURE: 70 MMHG | TEMPERATURE: 99 F | RESPIRATION RATE: 16 BRPM | OXYGEN SATURATION: 95 % | HEIGHT: 69 IN | HEART RATE: 77 BPM | WEIGHT: 241 LBS | BODY MASS INDEX: 35.7 KG/M2 | SYSTOLIC BLOOD PRESSURE: 122 MMHG

## 2025-02-25 DIAGNOSIS — G62.9 NEUROPATHY: Chronic | ICD-10-CM

## 2025-02-25 DIAGNOSIS — R73.03 PREDIABETES: Chronic | ICD-10-CM

## 2025-02-25 DIAGNOSIS — E29.1 TESTOSTERONE DEFICIENCY IN MALE: ICD-10-CM

## 2025-02-25 DIAGNOSIS — E78.49 OTHER HYPERLIPIDEMIA: Chronic | ICD-10-CM

## 2025-02-25 DIAGNOSIS — J45.20 MILD INTERMITTENT ASTHMA WITHOUT COMPLICATION: Chronic | ICD-10-CM

## 2025-02-25 DIAGNOSIS — Z12.5 PROSTATE CANCER SCREENING: ICD-10-CM

## 2025-02-25 DIAGNOSIS — E66.01 SEVERE OBESITY (BMI 35.0-35.9 WITH COMORBIDITY): Chronic | ICD-10-CM

## 2025-02-25 DIAGNOSIS — Z00.00 ENCOUNTER FOR SUBSEQUENT ANNUAL WELLNESS VISIT (AWV) IN MEDICARE PATIENT: Primary | ICD-10-CM

## 2025-02-25 DIAGNOSIS — I10 ESSENTIAL (PRIMARY) HYPERTENSION: Chronic | ICD-10-CM

## 2025-02-25 DIAGNOSIS — Z74.09 OTHER REDUCED MOBILITY: Chronic | ICD-10-CM

## 2025-02-25 LAB — PSA SERPL-MCNC: 0.76 NG/ML

## 2025-02-25 PROCEDURE — 3078F DIAST BP <80 MM HG: CPT | Mod: ,,, | Performed by: NURSE PRACTITIONER

## 2025-02-25 PROCEDURE — 3288F FALL RISK ASSESSMENT DOCD: CPT | Mod: ,,, | Performed by: NURSE PRACTITIONER

## 2025-02-25 PROCEDURE — 1124F ACP DISCUSS-NO DSCNMKR DOCD: CPT | Mod: ,,, | Performed by: NURSE PRACTITIONER

## 2025-02-25 PROCEDURE — 3074F SYST BP LT 130 MM HG: CPT | Mod: ,,, | Performed by: NURSE PRACTITIONER

## 2025-02-25 PROCEDURE — 1159F MED LIST DOCD IN RCRD: CPT | Mod: ,,, | Performed by: NURSE PRACTITIONER

## 2025-02-25 PROCEDURE — G0439 PPPS, SUBSEQ VISIT: HCPCS | Mod: ,,, | Performed by: NURSE PRACTITIONER

## 2025-02-25 PROCEDURE — 1125F AMNT PAIN NOTED PAIN PRSNT: CPT | Mod: ,,, | Performed by: NURSE PRACTITIONER

## 2025-02-25 PROCEDURE — 1170F FXNL STATUS ASSESSED: CPT | Mod: ,,, | Performed by: NURSE PRACTITIONER

## 2025-02-25 PROCEDURE — G0103 PSA SCREENING: HCPCS | Mod: ,,, | Performed by: CLINICAL MEDICAL LABORATORY

## 2025-02-25 PROCEDURE — 1101F PT FALLS ASSESS-DOCD LE1/YR: CPT | Mod: ,,, | Performed by: NURSE PRACTITIONER

## 2025-02-25 NOTE — ASSESSMENT & PLAN NOTE
Latest Reference Range & Units 11/21/24 10:57 12/03/24 08:38   Testosterone, Free 3.28 - 12.2 ng/dL 4.79 5.09   Testosterone, Total 240 - 950 ng/dL 142 (L) 151 (L)     PSA Total (ng/mL)   Date Value   02/21/2024 1.100     Were previously told that the [facility] pharmacy covers/carries testosterone gel but was unable to get it there so the [facility] and he still has not received the prescription.    Treatment has not been started with AndroGel 2 pumps daily.  Plan to recheck testosterone level lab only 4 weeks after starting TRT.    He is questioning whether to take TRT at all.  Long discussion on risk versus benefit.

## 2025-02-25 NOTE — ASSESSMENT & PLAN NOTE
Lab Results   Component Value Date    CHOL 140 05/20/2024    CHOL 134 11/14/2023    CHOL 187 04/19/2023     Lab Results   Component Value Date    HDL 63 (H) 05/20/2024    HDL 65 (H) 11/14/2023    HDL 72 (H) 04/19/2023     Lab Results   Component Value Date    LDLCALC 65 05/20/2024    LDLCALC 58 11/14/2023    LDLCALC 108 04/19/2023     Lab Results   Component Value Date    TRIG 60 05/20/2024    TRIG 57 11/14/2023    TRIG 37 04/19/2023       Lab Results   Component Value Date    CHOLHDL 2.2 05/20/2024    CHOLHDL 2.1 11/14/2023    CHOLHDL 2.6 04/19/2023     Controlled, continue atorvastatin 40 mg daily.

## 2025-02-25 NOTE — PATIENT INSTRUCTIONS
Counseling and Referral of Other Preventative  (Italic type indicates deductible and co-insurance are waived)    Patient Name: Chano Clement  Today's Date: 2/25/2025    Health Maintenance       Date Due Completion Date    Shingles Vaccine (1 of 2) Never done ---    COVID-19 Vaccine (3 - 2024-25 season) 09/01/2024 3/1/2021    RSV Vaccine (Age 60+ and Pregnant patients) (1 - 1-dose 75+ series) Never done ---    PROSTATE-SPECIFIC ANTIGEN 02/21/2025 2/21/2024    Hemoglobin A1c (Prediabetes) 11/21/2025 11/21/2024    High Dose Statin 01/22/2026 1/22/2025    Aspirin/Antiplatelet Therapy 01/22/2026 1/22/2025    Colorectal Cancer Screening 05/10/2026 5/10/2021    Lipid Panel 05/20/2029 5/20/2024    TETANUS VACCINE 11/21/2033 11/21/2023        No orders of the defined types were placed in this encounter.      The following information is provided to all patients.  This information is to help you find resources for any of the problems found today that may be affecting your health:                  Living healthy guide: [place].gov    Understanding Diabetes: www.diabetes.org      Eating healthy: www.cdc.gov/healthyweight      CDC home safety checklist: www.cdc.gov/steadi/patient.html      Agency on Aging: [place].gov    Alcoholics anonymous (AA): www.aa.org      Physical Activity: www.kyaw.nih.gov/cl3uhyi      Tobacco use: [place].gov

## 2025-02-25 NOTE — ASSESSMENT & PLAN NOTE
Body mass index is 35.59 kg/m². Morbid obesity complicates all aspects of disease management from diagnostic modalities to treatment. Weight loss encouraged and health benefits explained to patient.

## 2025-02-25 NOTE — ASSESSMENT & PLAN NOTE
Lab Results   Component Value Date    HGBA1C 5.9 11/21/2024    HGBA1C 6.3 05/20/2024    HGBA1C 6.3 11/14/2023     Stable, last A1c improved with lifestyle changes and weight loss.

## 2025-02-25 NOTE — ASSESSMENT & PLAN NOTE
Stable, mild with use of albuterol on rare occasion.  Followed by pulmonology, Dr. Esthela Jaramillo

## 2025-03-03 ENCOUNTER — HOSPITAL ENCOUNTER (OUTPATIENT)
Facility: HOSPITAL | Age: 75
LOS: 1 days | Discharge: HOME | End: 2025-03-04
Attending: EMERGENCY MEDICINE | Admitting: HOSPITALIST
Payer: COMMERCIAL

## 2025-03-03 DIAGNOSIS — E78.00 PURE HYPERCHOLESTEROLEMIA: Chronic | ICD-10-CM

## 2025-03-03 DIAGNOSIS — E11.69 TYPE 2 DIABETES MELLITUS WITH OTHER SPECIFIED COMPLICATION, WITHOUT LONG-TERM CURRENT USE OF INSULIN: Primary | ICD-10-CM

## 2025-03-03 DIAGNOSIS — G45.9 TIA (TRANSIENT ISCHEMIC ATTACK): ICD-10-CM

## 2025-03-03 DIAGNOSIS — R29.818 ACUTE FOCAL NEUROLOGICAL DEFICIT: ICD-10-CM

## 2025-03-03 DIAGNOSIS — H53.2 DIPLOPIA: ICD-10-CM

## 2025-03-03 PROBLEM — E66.9 OBESITY: Status: ACTIVE | Noted: 2025-03-03

## 2025-03-03 LAB
ALBUMIN SERPL BCP-MCNC: 3.7 G/DL (ref 3.4–4.8)
ALBUMIN/GLOB SERPL: 1.4 {RATIO}
ALP SERPL-CCNC: 71 U/L (ref 40–150)
ALT SERPL W P-5'-P-CCNC: 13 U/L
ANION GAP SERPL CALCULATED.3IONS-SCNC: 11 MMOL/L (ref 7–16)
APTT PPP: 30.1 SECONDS (ref 25.2–37.3)
AST SERPL W P-5'-P-CCNC: 18 U/L (ref 5–34)
BASOPHILS # BLD AUTO: 0.04 K/UL (ref 0–0.2)
BASOPHILS NFR BLD AUTO: 0.5 % (ref 0–1)
BILIRUB SERPL-MCNC: 0.5 MG/DL
BILIRUB UR QL STRIP: NEGATIVE
BUN SERPL-MCNC: 21 MG/DL (ref 8–26)
BUN/CREAT SERPL: 19 (ref 6–20)
CALCIUM SERPL-MCNC: 8.7 MG/DL (ref 8.8–10)
CHLORIDE SERPL-SCNC: 105 MMOL/L (ref 98–107)
CHOLEST SERPL-MCNC: 103 MG/DL
CHOLEST/HDLC SERPL: 2.1 {RATIO}
CLARITY UR: CLEAR
CO2 SERPL-SCNC: 24 MMOL/L (ref 23–31)
COLOR UR: ABNORMAL
CREAT SERPL-MCNC: 1.08 MG/DL (ref 0.72–1.25)
DIFFERENTIAL METHOD BLD: ABNORMAL
EGFR (NO RACE VARIABLE) (RUSH/TITUS): 72 ML/MIN/1.73M2
EOSINOPHIL # BLD AUTO: 0.22 K/UL (ref 0–0.5)
EOSINOPHIL NFR BLD AUTO: 2.7 % (ref 1–4)
ERYTHROCYTE [DISTWIDTH] IN BLOOD BY AUTOMATED COUNT: 13.7 % (ref 11.5–14.5)
GLOBULIN SER-MCNC: 2.6 G/DL (ref 2–4)
GLUCOSE SERPL-MCNC: 103 MG/DL (ref 70–105)
GLUCOSE SERPL-MCNC: 125 MG/DL (ref 70–105)
GLUCOSE SERPL-MCNC: 92 MG/DL (ref 82–115)
GLUCOSE SERPL-MCNC: 96 MG/DL (ref 70–105)
GLUCOSE UR STRIP-MCNC: NORMAL MG/DL
HCT VFR BLD AUTO: 40.8 % (ref 40–54)
HDLC SERPL-MCNC: 48 MG/DL (ref 35–60)
HGB BLD-MCNC: 13 G/DL (ref 13.5–18)
IMM GRANULOCYTES # BLD AUTO: 0.03 K/UL (ref 0–0.04)
IMM GRANULOCYTES NFR BLD: 0.4 % (ref 0–0.4)
INR BLD: 1.02
KETONES UR STRIP-SCNC: NEGATIVE MG/DL
LDLC SERPL CALC-MCNC: 46 MG/DL
LDLC/HDLC SERPL: 1 {RATIO}
LEUKOCYTE ESTERASE UR QL STRIP: ABNORMAL
LYMPHOCYTES # BLD AUTO: 2.73 K/UL (ref 1–4.8)
LYMPHOCYTES NFR BLD AUTO: 33.8 % (ref 27–41)
MCH RBC QN AUTO: 29.3 PG (ref 27–31)
MCHC RBC AUTO-ENTMCNC: 31.9 G/DL (ref 32–36)
MCV RBC AUTO: 91.9 FL (ref 80–96)
MONOCYTES # BLD AUTO: 0.79 K/UL (ref 0–0.8)
MONOCYTES NFR BLD AUTO: 9.8 % (ref 2–6)
MPC BLD CALC-MCNC: 10.2 FL (ref 9.4–12.4)
MUCOUS, UA: ABNORMAL /LPF
NEUTROPHILS # BLD AUTO: 4.26 K/UL (ref 1.8–7.7)
NEUTROPHILS NFR BLD AUTO: 52.8 % (ref 53–65)
NITRITE UR QL STRIP: NEGATIVE
NONHDLC SERPL-MCNC: 55 MG/DL
NRBC # BLD AUTO: 0 X10E3/UL
NRBC, AUTO (.00): 0 %
PH UR STRIP: 5.5 PH UNITS
PLATELET # BLD AUTO: 201 K/UL (ref 150–400)
POTASSIUM SERPL-SCNC: 4.1 MMOL/L (ref 3.5–5.1)
PROT SERPL-MCNC: 6.3 G/DL (ref 5.8–7.6)
PROT UR QL STRIP: NEGATIVE
PROTHROMBIN TIME: 13.3 SECONDS (ref 11.7–14.7)
RBC # BLD AUTO: 4.44 M/UL (ref 4.6–6.2)
RBC # UR STRIP: NEGATIVE /UL
RBC #/AREA URNS HPF: 3 /HPF
SODIUM SERPL-SCNC: 136 MMOL/L (ref 136–145)
SP GR UR STRIP: >1.05
SQUAMOUS #/AREA URNS LPF: ABNORMAL /HPF
TRIGL SERPL-MCNC: 44 MG/DL (ref 34–140)
TSH SERPL DL<=0.005 MIU/L-ACNC: 0.88 UIU/ML (ref 0.35–4.94)
UROBILINOGEN UR STRIP-ACNC: NORMAL MG/DL
VLDLC SERPL-MCNC: 9 MG/DL
WBC # BLD AUTO: 8.07 K/UL (ref 4.5–11)
WBC #/AREA URNS HPF: 4 /HPF

## 2025-03-03 PROCEDURE — 99285 EMERGENCY DEPT VISIT HI MDM: CPT | Mod: 25

## 2025-03-03 PROCEDURE — 11000001 HC ACUTE MED/SURG PRIVATE ROOM

## 2025-03-03 PROCEDURE — 63600175 PHARM REV CODE 636 W HCPCS: Performed by: INTERNAL MEDICINE

## 2025-03-03 PROCEDURE — 99900031 HC PATIENT EDUCATION (STAT)

## 2025-03-03 PROCEDURE — 82962 GLUCOSE BLOOD TEST: CPT

## 2025-03-03 PROCEDURE — 27000221 HC OXYGEN, UP TO 24 HOURS

## 2025-03-03 PROCEDURE — 94660 CPAP INITIATION&MGMT: CPT

## 2025-03-03 PROCEDURE — 84443 ASSAY THYROID STIM HORMONE: CPT | Performed by: EMERGENCY MEDICINE

## 2025-03-03 PROCEDURE — 93005 ELECTROCARDIOGRAM TRACING: CPT

## 2025-03-03 PROCEDURE — 81003 URINALYSIS AUTO W/O SCOPE: CPT | Performed by: INTERNAL MEDICINE

## 2025-03-03 PROCEDURE — 25000003 PHARM REV CODE 250: Performed by: EMERGENCY MEDICINE

## 2025-03-03 PROCEDURE — 85610 PROTHROMBIN TIME: CPT | Performed by: EMERGENCY MEDICINE

## 2025-03-03 PROCEDURE — 25500020 PHARM REV CODE 255: Performed by: EMERGENCY MEDICINE

## 2025-03-03 PROCEDURE — 83036 HEMOGLOBIN GLYCOSYLATED A1C: CPT | Performed by: INTERNAL MEDICINE

## 2025-03-03 PROCEDURE — 36415 COLL VENOUS BLD VENIPUNCTURE: CPT | Performed by: EMERGENCY MEDICINE

## 2025-03-03 PROCEDURE — 27000190 HC CPAP FULL FACE MASK W/VALVE

## 2025-03-03 PROCEDURE — 85025 COMPLETE CBC W/AUTO DIFF WBC: CPT | Performed by: EMERGENCY MEDICINE

## 2025-03-03 PROCEDURE — 85730 THROMBOPLASTIN TIME PARTIAL: CPT | Performed by: EMERGENCY MEDICINE

## 2025-03-03 PROCEDURE — 99900035 HC TECH TIME PER 15 MIN (STAT)

## 2025-03-03 PROCEDURE — 80061 LIPID PANEL: CPT | Performed by: EMERGENCY MEDICINE

## 2025-03-03 PROCEDURE — 94761 N-INVAS EAR/PLS OXIMETRY MLT: CPT

## 2025-03-03 PROCEDURE — 80053 COMPREHEN METABOLIC PANEL: CPT | Performed by: EMERGENCY MEDICINE

## 2025-03-03 PROCEDURE — 25000003 PHARM REV CODE 250: Performed by: INTERNAL MEDICINE

## 2025-03-03 RX ORDER — IBUPROFEN 200 MG
24 TABLET ORAL
Status: DISCONTINUED | OUTPATIENT
Start: 2025-03-03 | End: 2025-03-04 | Stop reason: HOSPADM

## 2025-03-03 RX ORDER — CYCLOBENZAPRINE HCL 10 MG
10 TABLET ORAL NIGHTLY PRN
Status: DISCONTINUED | OUTPATIENT
Start: 2025-03-03 | End: 2025-03-04 | Stop reason: HOSPADM

## 2025-03-03 RX ORDER — CLOPIDOGREL BISULFATE 300 MG/1
300 TABLET, FILM COATED ORAL ONCE
Status: DISCONTINUED | OUTPATIENT
Start: 2025-03-03 | End: 2025-03-03

## 2025-03-03 RX ORDER — FAMOTIDINE 20 MG/1
20 TABLET, FILM COATED ORAL 2 TIMES DAILY
Status: DISCONTINUED | OUTPATIENT
Start: 2025-03-04 | End: 2025-03-04 | Stop reason: HOSPADM

## 2025-03-03 RX ORDER — ASPIRIN 81 MG/1
81 TABLET ORAL DAILY
Status: DISCONTINUED | OUTPATIENT
Start: 2025-03-04 | End: 2025-03-04 | Stop reason: HOSPADM

## 2025-03-03 RX ORDER — ATORVASTATIN CALCIUM 80 MG/1
80 TABLET, FILM COATED ORAL DAILY
Status: DISCONTINUED | OUTPATIENT
Start: 2025-03-03 | End: 2025-03-03

## 2025-03-03 RX ORDER — ATORVASTATIN CALCIUM 80 MG/1
80 TABLET, FILM COATED ORAL DAILY
Status: DISCONTINUED | OUTPATIENT
Start: 2025-03-03 | End: 2025-03-04 | Stop reason: HOSPADM

## 2025-03-03 RX ORDER — LABETALOL HYDROCHLORIDE 5 MG/ML
10 INJECTION, SOLUTION INTRAVENOUS
Status: DISCONTINUED | OUTPATIENT
Start: 2025-03-03 | End: 2025-03-04 | Stop reason: HOSPADM

## 2025-03-03 RX ORDER — IBUPROFEN 200 MG
16 TABLET ORAL
Status: DISCONTINUED | OUTPATIENT
Start: 2025-03-03 | End: 2025-03-04 | Stop reason: HOSPADM

## 2025-03-03 RX ORDER — INSULIN ASPART 100 [IU]/ML
0-5 INJECTION, SOLUTION INTRAVENOUS; SUBCUTANEOUS
Status: DISCONTINUED | OUTPATIENT
Start: 2025-03-03 | End: 2025-03-04 | Stop reason: HOSPADM

## 2025-03-03 RX ORDER — TAMSULOSIN HYDROCHLORIDE 0.4 MG/1
0.4 CAPSULE ORAL DAILY
Status: DISCONTINUED | OUTPATIENT
Start: 2025-03-04 | End: 2025-03-04 | Stop reason: HOSPADM

## 2025-03-03 RX ORDER — CETIRIZINE HYDROCHLORIDE 10 MG/1
10 TABLET ORAL DAILY
Status: DISCONTINUED | OUTPATIENT
Start: 2025-03-04 | End: 2025-03-04 | Stop reason: HOSPADM

## 2025-03-03 RX ORDER — IOPAMIDOL 755 MG/ML
100 INJECTION, SOLUTION INTRAVASCULAR
Status: COMPLETED | OUTPATIENT
Start: 2025-03-03 | End: 2025-03-03

## 2025-03-03 RX ORDER — ALBUTEROL SULFATE 0.83 MG/ML
2.5 SOLUTION RESPIRATORY (INHALATION) EVERY 4 HOURS PRN
Status: DISCONTINUED | OUTPATIENT
Start: 2025-03-03 | End: 2025-03-04 | Stop reason: HOSPADM

## 2025-03-03 RX ORDER — GABAPENTIN 400 MG/1
1200 CAPSULE ORAL 2 TIMES DAILY
Status: DISCONTINUED | OUTPATIENT
Start: 2025-03-03 | End: 2025-03-04 | Stop reason: HOSPADM

## 2025-03-03 RX ORDER — ONDANSETRON HYDROCHLORIDE 2 MG/ML
4 INJECTION, SOLUTION INTRAVENOUS EVERY 8 HOURS PRN
Status: DISCONTINUED | OUTPATIENT
Start: 2025-03-03 | End: 2025-03-04 | Stop reason: HOSPADM

## 2025-03-03 RX ORDER — DULOXETIN HYDROCHLORIDE 30 MG/1
60 CAPSULE, DELAYED RELEASE ORAL 2 TIMES DAILY
Status: DISCONTINUED | OUTPATIENT
Start: 2025-03-03 | End: 2025-03-04 | Stop reason: HOSPADM

## 2025-03-03 RX ORDER — ENOXAPARIN SODIUM 100 MG/ML
40 INJECTION SUBCUTANEOUS EVERY 24 HOURS
Status: DISCONTINUED | OUTPATIENT
Start: 2025-03-03 | End: 2025-03-04 | Stop reason: HOSPADM

## 2025-03-03 RX ORDER — BISACODYL 10 MG/1
10 SUPPOSITORY RECTAL DAILY PRN
Status: DISCONTINUED | OUTPATIENT
Start: 2025-03-03 | End: 2025-03-04 | Stop reason: HOSPADM

## 2025-03-03 RX ORDER — CLOPIDOGREL BISULFATE 75 MG/1
75 TABLET ORAL DAILY
Status: DISCONTINUED | OUTPATIENT
Start: 2025-03-04 | End: 2025-03-04 | Stop reason: HOSPADM

## 2025-03-03 RX ORDER — TRAZODONE HYDROCHLORIDE 50 MG/1
200 TABLET ORAL NIGHTLY
Status: DISCONTINUED | OUTPATIENT
Start: 2025-03-03 | End: 2025-03-04 | Stop reason: HOSPADM

## 2025-03-03 RX ORDER — CLOPIDOGREL BISULFATE 300 MG/1
300 TABLET, FILM COATED ORAL
Status: COMPLETED | OUTPATIENT
Start: 2025-03-03 | End: 2025-03-03

## 2025-03-03 RX ORDER — PRAMIPEXOLE DIHYDROCHLORIDE 0.25 MG/1
0.25 TABLET ORAL NIGHTLY
Status: DISCONTINUED | OUTPATIENT
Start: 2025-03-03 | End: 2025-03-04 | Stop reason: HOSPADM

## 2025-03-03 RX ORDER — GLUCAGON 1 MG
1 KIT INJECTION
Status: DISCONTINUED | OUTPATIENT
Start: 2025-03-03 | End: 2025-03-04 | Stop reason: HOSPADM

## 2025-03-03 RX ADMIN — IOPAMIDOL 100 ML: 755 INJECTION, SOLUTION INTRAVENOUS at 05:03

## 2025-03-03 RX ADMIN — ATORVASTATIN CALCIUM 80 MG: 80 TABLET, FILM COATED ORAL at 08:03

## 2025-03-03 RX ADMIN — ENOXAPARIN SODIUM 40 MG: 40 INJECTION SUBCUTANEOUS at 08:03

## 2025-03-03 RX ADMIN — CLOPIDOGREL BISULFATE 300 MG: 300 TABLET, FILM COATED ORAL at 05:03

## 2025-03-03 RX ADMIN — DULOXETINE HYDROCHLORIDE 60 MG: 30 CAPSULE, DELAYED RELEASE ORAL at 08:03

## 2025-03-03 RX ADMIN — PRAMIPEXOLE DIHYDROCHLORIDE 0.25 MG: 0.25 TABLET ORAL at 08:03

## 2025-03-03 RX ADMIN — TRAZODONE HYDROCHLORIDE 200 MG: 50 TABLET ORAL at 08:03

## 2025-03-03 RX ADMIN — GABAPENTIN 1200 MG: 400 CAPSULE ORAL at 08:03

## 2025-03-03 NOTE — ED PROVIDER NOTES
Encounter Date: 3/3/2025    SCRIBE #1 NOTE: I, Gus Pickens MD, am scribing for, and in the presence of,  Susan Morin.       History     Chief Complaint   Patient presents with    Eye Problem     Pt presents to ed with c/o having a white flash in his vision this morning when he woke up at 0800 this morning. States his eyes are sensitive to light and he is having light headness      This is a 75 y/o male, who presents to the ED for evaluation. He states this morning at 0800 this morning he noticed vision issues. He states he was having sensitivity to light and noticed his right arm was weaker than the left.There is no hx of a HA or speech issues. He states he took 4 Aspirins today while at home. There are no other complaints/pain in the ED at this time. He has had a TIA, RLS, hyperlipidemia, GERD, HTN, and prediabetes. He is a former smoker.     The history is provided by the patient and the spouse.     Review of patient's allergies indicates:  No Known Allergies  Past Medical History:   Diagnosis Date    Anxiety disorder, unspecified     Chronic bilateral low back pain with left-sided sciatica 04/12/2022    Chronic rhinitis 04/12/2022    Depressive disorder     Diabetic eye exam 09/20/2023    [facility]    Essential (primary) hypertension     GERD (gastroesophageal reflux disease)     History of colon polyps 04/12/2022    Hyperlipidemia     Laceration of left wrist 11/21/2023    RAYMUNDO on CPAP     Prediabetes 04/19/2023    Lab Results Component Value Date  HGBA1C 6.3 04/19/2023      RLS (restless legs syndrome)     TIA (transient ischemic attack)      Past Surgical History:   Procedure Laterality Date    BACK SURGERY      Caudal HARPER  02/18/2013    Dr Rdz    FOOT SURGERY Left     Left l4-L5 TFESI Left 06/20/2012    Dr Rdz    MOLE REMOVAL      NERVE REPAIR Left 11/21/2023    Procedure: REPAIR, SUPERFICIAL BRANCH OF RADIAL   NERVE;  Surgeon: Val Clifton MD;  Location: [place]  [facility] OR;  Service: General;  Laterality: Left;    REPAIR OF TENDON OF UPPER EXTREMITY Left 11/21/2023    Procedure: REPAIR BRACHIORADIALIS TENDON;  Surgeon: Val Clifton MD;  Location: [facility] OR;  Service: General;  Laterality: Left;    REPAIR, ARTERY, RADIAL Left 11/21/2023    Procedure: REPAIR/LIGATION, ARTERY, RADIAL;  Surgeon: Val Clifton MD;  Location: [facility] OR;  Service: General;  Laterality: Left;    SHOULDER ARTHROSCOPY Bilateral     T3-T4 HARPER  02/27/2012    Dr Rdz    TONSILLECTOMY      UVULECTOMY       Family History   Problem Relation Name Age of Onset    Heart disease Mother      Rheumatic fever Mother      Heart disease Father      Heart attack Father      No Known Problems Sister      Arthritis Sister      Cancer Brother          prostate ca    Cancer Brother          prostate ca    No Known Problems Maternal Grandmother      No Known Problems Maternal Grandfather      No Known Problems Paternal Grandmother      No Known Problems Paternal Grandfather      Hyperlipidemia Son      Hypertension Son       Social History[1]  Review of Systems    Physical Exam     Initial Vitals [03/03/25 1538]   BP Pulse Resp Temp SpO2   113/62 76 16 98.2 °F (36.8 °C) 96 %      MAP       --         Physical Exam    Nursing note and vitals reviewed.  Constitutional: He appears well-developed and well-nourished.   HENT:   Head: Normocephalic and atraumatic.   Eyes: EOM are normal. Pupils are equal, round, and reactive to light.   Neck: Neck supple. No thyromegaly present. No JVD present.   Normal range of motion.  Cardiovascular:  Normal rate, regular rhythm, normal heart sounds and intact distal pulses.           No murmur heard.  Pulmonary/Chest: Breath sounds normal. No stridor. No respiratory distress. He has no wheezes.   Abdominal: Abdomen is soft. Bowel sounds are normal. He exhibits no distension. There is no abdominal tenderness.   Musculoskeletal:         General: No tenderness or edema. Normal  "range of motion.      Cervical back: Normal range of motion and neck supple.     Lymphadenopathy:     He has no cervical adenopathy.   Neurological: He is alert and oriented to person, place, and time. He has normal strength. No cranial nerve deficit or sensory deficit. GCS score is 15. GCS eye subscore is 4. GCS verbal subscore is 5. GCS motor subscore is 6.   Please see Stroke Scale for a further assessment.      Skin: Skin is warm and dry. Capillary refill takes less than 2 seconds. No rash noted.   Psychiatric: He has a normal mood and affect.         ED Course   Procedures  Labs Reviewed   POCT GLUCOSE MONITORING CONTINUOUS - Abnormal       Result Value    POC Glucose 125 (*)           Imaging Results    None          Medications - No data to display  Medical Decision Making    Additional MDM:     NIH Stroke Scale:   Interval = baseline (upon arrival/admit)  Level of consciousness = 0 - alert  LOC questions = 0 - answers both correctly  LOC commands = 0 - performs both correctly  Best gaze = 0 - normal  Visual = 0 - no visual loss  Facial palsy = 0 - normal  Motor left arm =  0 - no drift  Motor right arm =  0 - no drift  Motor left leg = 0 - no drift  Motor right leg =  0 - no drift  Limb ataxia = 0 - absent  Sensory = 0 - normal  Best language = 0 - no aphasia  Dysarthria = 0 - normal articulation  Extinction and inattention = 0 - no neglect  NIH Stroke Scale Total = 0             Attending Attestation:           Physician Attestation for Scribe:  Physician Attestation Statement for Scribe #1: I, Gus Pickens MD, reviewed documentation, as scribed by Susan Morin in my presence, and it is both accurate and complete.                                    Clinical Impression:   ***Please document a Clinical Impression and click the "Refresh" button to refresh your note and automatically pull in before signing.***                [1]   Social History  Tobacco Use    Smoking status: Former     Current " packs/day: 0.00     Average packs/day: 1 pack/day for 10.0 years (10.0 ttl pk-yrs)     Types: Cigarettes     Start date:      Quit date:      Years since quittin.2     Passive exposure: Never    Smokeless tobacco: Never    Tobacco comments:     Use to be a smoker, quit in . Stated he smoked for about 10 years   Substance Use Topics    Alcohol use: Yes     Alcohol/week: 1.0 standard drink of alcohol     Types: 1 Cans of beer per week     Comment: Occasionally    Drug use: Never

## 2025-03-03 NOTE — SUBJECTIVE & OBJECTIVE
HPI:  74 y.o. male with T2DM, HTN, RAYMUNDO presenting with dizziness, weakness, diplopia.  Patient woke up this morning and noted double vison, which improved when he closed one eye.  He felt weak in both legs, but R more than L.  Also felt trembling and some clumsiness in R arm.  Took 4 aspirins.     Images personally reviewed and interpreted:  Study: Head CT  Study Interpretation: no acute findings     Assessment and plan:  Presentation concerning for TIA/minor stroke.  Out of window for thrombolytics.  Recommend admission for further workup.    - MRI brain w/o contrast to evaluate for presence of and characterization of infarct  - If passes bedside swallow: Load with aspirin 325 mg and clopidogrel 300 mg, followed by ASA 81 and clopidogrel 75 mg daily.  - If unable to swallow: given rectal  and consider NGT placement.  - High intensity statin  - Transothoracic echocardiogram  - Lipid profile and A1c for evaluation of modifiable risk factors  - PT/OT/SLP eval and Rx  - Permissive HTN < 220/120 mmHg x 48-72h pending results of vessel imaging      Lytics recommendation: Thrombolytic therapy not recommended due to Outside of treatment window     Thrombectomy recommendation: No; at this time symptoms not suggestive of large vessel occlusion  Placement recommendation: admit to inpatient

## 2025-03-03 NOTE — TELEMEDICINE CONSULT
"[facility]  Vascular Neurology  Comprehensive Stroke Center  TeleVascular Neurology Acute Consultation Note        Consult Information  Consults    Consulting Provider: RADHA PARADA   Current Providers  No providers found    Patient Location:  [facility] EMERGENCY DEPART* Emergency Department    Spoke hospital nurse at bedside with patient assisting consultant.  Patient information was obtained from patient.       Stroke Documentation  Acute Stroke Times   Last Known Normal Date: 03/02/25  Last Known Normal Time: 2300  Stroke Team Called Date: 03/03/25  Stroke Team Called Time: 1649  Stroke Team Arrival Date: 03/03/25  Stroke Team Arrival Time: 1652  CT Interpretation Time: 1705  Thrombolytic Therapy Recommended: No  Thrombectomy Recommended: No    NIH Scale:  1a. Level of Consciousness: 0-->Alert, keenly responsive  1b. LOC Questions: 0-->Answers both questions correctly  1c. LOC Commands: 0-->Performs both tasks correctly  2. Best Gaze: 0-->Normal  3. Visual: 0-->No visual loss  4. Facial Palsy: 0-->Normal symmetrical movements  5a. Motor Arm, Left: 0-->No drift, limb holds 90 (or 45) degrees for full 10 secs  5b. Motor Arm, Right: 0-->No drift, limb holds 90 (or 45) degrees for full 10 secs  6a. Motor Leg, Left: 0-->No drift, leg holds 30 degree position for full 5 secs  6b. Motor Leg, Right: 0-->No drift, leg holds 30 degree position for full 5 secs  7. Limb Ataxia: 0-->Absent  8. Sensory: 0-->Normal, no sensory loss  9. Best Language: 0-->No aphasia, normal  10. Dysarthria: 0-->Normal  11. Extinction and Inattention (formerly Neglect): 0-->No abnormality  Total (NIH Stroke Scale): 0      Modified Patt:    Braymer Coma Scale:     ABCD2 Score:    [id number] Score:    HAS -BLED Score:    ICH Score:    Hunt & Patino Classification:      Blood pressure 126/75, pulse 75, temperature 98.2 °F (36.8 °C), temperature source Oral, resp. rate 20, height 5' 9" (1.753 m), weight 108.9 kg (240 " lb), SpO2 95%.      In my opinion, this was a: Tier 2; VAN Stroke Assessment: Negative     Medical Decision Making  HPI:  74 y.o. male with T2DM, HTN, RAYMUNDO presenting with dizziness, weakness, diplopia.  Patient woke up this morning and noted double vison, which improved when he closed one eye.  He felt weak in both legs, but R more than L.  Also felt trembling and some clumsiness in R arm.  Took 4 aspirins.     Images personally reviewed and interpreted:  Study: Head CT  Study Interpretation: no acute findings     Assessment and plan:  Presentation concerning for TIA/minor stroke.  Out of window for thrombolytics.  Recommend admission for further workup.    - MRI brain w/o contrast to evaluate for presence of and characterization of infarct  - If passes bedside swallow: Load with aspirin 325 mg and clopidogrel 300 mg, followed by ASA 81 and clopidogrel 75 mg daily.  - If unable to swallow: given rectal  and consider NGT placement.  - High intensity statin  - Transothoracic echocardiogram  - Lipid profile and A1c for evaluation of modifiable risk factors  - PT/OT/SLP eval and Rx  - Permissive HTN < 220/120 mmHg x 48-72h pending results of vessel imaging      Lytics recommendation: Thrombolytic therapy not recommended due to Outside of treatment window     Thrombectomy recommendation: No; at this time symptoms not suggestive of large vessel occlusion  Placement recommendation: admit to inpatient               ROS  Physical Exam  Neurological:      Mental Status: He is oriented to person, place, and time.      Comments: EOMI, but subjective diplopia on far lateral gaze  No facial droop or dysarthria  No limb wkns  No ataxia       Past Medical History:   Diagnosis Date    Anxiety disorder, unspecified     Chronic bilateral low back pain with left-sided sciatica 04/12/2022    Chronic rhinitis 04/12/2022    Depressive disorder     Diabetic eye exam 09/20/2023    Dr. [facility]    Essential  (primary) hypertension     GERD (gastroesophageal reflux disease)     History of colon polyps 04/12/2022    Hyperlipidemia     Laceration of left wrist 11/21/2023    RAYMUNDO on CPAP     Prediabetes 04/19/2023    Lab Results Component Value Date  HGBA1C 6.3 04/19/2023      RLS (restless legs syndrome)     TIA (transient ischemic attack)      Past Surgical History:   Procedure Laterality Date    BACK SURGERY      Caudal HARPER  02/18/2013    Dr Rdz    FOOT SURGERY Left     Left l4-L5 TFESI Left 06/20/2012    Dr Rdz    MOLE REMOVAL      NERVE REPAIR Left 11/21/2023    Procedure: REPAIR, SUPERFICIAL BRANCH OF RADIAL   NERVE;  Surgeon: Val Clifton MD;  Location: [facility] OR;  Service: General;  Laterality: Left;    REPAIR OF TENDON OF UPPER EXTREMITY Left 11/21/2023    Procedure: REPAIR BRACHIORADIALIS TENDON;  Surgeon: Val Clifton MD;  Location: [facility] OR;  Service: General;  Laterality: Left;    REPAIR, ARTERY, RADIAL Left 11/21/2023    Procedure: REPAIR/LIGATION, ARTERY, RADIAL;  Surgeon: Val Clifton MD;  Location: [facility] OR;  Service: General;  Laterality: Left;    SHOULDER ARTHROSCOPY Bilateral     T3-T4 HARPER  02/27/2012    Dr Rdz    TONSILLECTOMY      UVULECTOMY       Family History   Problem Relation Name Age of Onset    Heart disease Mother      Rheumatic fever Mother      Heart disease Father      Heart attack Father      No Known Problems Sister      Arthritis Sister      Cancer Brother          prostate ca    Cancer Brother          prostate ca    No Known Problems Maternal Grandmother      No Known Problems Maternal Grandfather      No Known Problems Paternal Grandmother      No Known Problems Paternal Grandfather      Hyperlipidemia Son      Hypertension Son         Diagnoses  Problem Noted   Diplopia 3/3/2025       Loreta Wylie MD      Emergent/Acute neurological consultation requested by spoke provider due to critical concerns for possible cerebrovascular event that could result  in permanent loss of neurologic/bodily function, severe disability or death of this patient.  Immediate/timely evaluation by a highly prepared expert is paramount for optimal outcomes  High risk for neurological deterioration if not properly diagnosed  High risk for neurological deterioration if not treated promplty/as soon as possible  Complex diagnostic evaluation may be required (advanced imaging)  High risk treatment options (thrombolytics and/or thrombectomy)    Patient care was coordinated with spoke provider, including but not limted to    Discussing likely diagnosis/etiology of symptoms  Making recommendations for further diagnostic studies  Making recommendations for intravenous thrombolytics or other advanced therapies  Making recommendations for disposition (admission/transfer for higher level of care)      Neurology consultation requested by spoke provider. Audiovisual encounter with the patient performed using a secure connection.  Results and impressions from the visit are documented on this note and were communicated to the consulting provider/team via direct communication. The note has been shared for addition to the patients electronic medical record.

## 2025-03-03 NOTE — Clinical Note
Diagnosis: Acute focal neurological deficit [795915]   Future Attending Provider: CARLOS PRO [[id number]]

## 2025-03-04 VITALS
WEIGHT: 240 LBS | HEART RATE: 74 BPM | OXYGEN SATURATION: 99 % | RESPIRATION RATE: 16 BRPM | DIASTOLIC BLOOD PRESSURE: 68 MMHG | BODY MASS INDEX: 35.55 KG/M2 | TEMPERATURE: 99 F | SYSTOLIC BLOOD PRESSURE: 119 MMHG | HEIGHT: 69 IN

## 2025-03-04 LAB
ALBUMIN SERPL BCP-MCNC: 3.6 G/DL (ref 3.4–4.8)
ALBUMIN/GLOB SERPL: 1.4 {RATIO}
ALP SERPL-CCNC: 67 U/L (ref 40–150)
ALT SERPL W P-5'-P-CCNC: 11 U/L
ANION GAP SERPL CALCULATED.3IONS-SCNC: 11 MMOL/L (ref 7–16)
AORTIC ROOT ANNULUS: 3.35 CM
AORTIC VALVE CUSP SEPERATION: 2.45 CM
APTT PPP: 31.3 SECONDS (ref 25.2–37.3)
AST SERPL W P-5'-P-CCNC: 18 U/L (ref 5–34)
AV INDEX (PROSTH): 0.48
AV MEAN GRADIENT: 3 MMHG
AV PEAK GRADIENT: 5 MMHG
AV VALVE AREA BY VELOCITY RATIO: 2.2 CM²
AV VALVE AREA: 1.7 CM²
AV VELOCITY RATIO: 0.64
BASOPHILS # BLD AUTO: 0.05 K/UL (ref 0–0.2)
BASOPHILS NFR BLD AUTO: 0.6 % (ref 0–1)
BILIRUB SERPL-MCNC: 0.7 MG/DL
BSA FOR ECHO PROCEDURE: 2.3 M2
BUN SERPL-MCNC: 18 MG/DL (ref 8–26)
BUN/CREAT SERPL: 16 (ref 6–20)
CALCIUM SERPL-MCNC: 9 MG/DL (ref 8.8–10)
CHLORIDE SERPL-SCNC: 107 MMOL/L (ref 98–107)
CO2 SERPL-SCNC: 25 MMOL/L (ref 23–31)
CREAT SERPL-MCNC: 1.15 MG/DL (ref 0.72–1.25)
CV ECHO LV RWT: 0.58 CM
DIFFERENTIAL METHOD BLD: ABNORMAL
DOP CALC AO PEAK VEL: 1.1 M/S
DOP CALC AO VTI: 21.7 CM
DOP CALC LVOT AREA: 3.5 CM2
DOP CALC LVOT DIAMETER: 2.1 CM
DOP CALC LVOT PEAK VEL: 0.7 M/S
DOP CALC LVOT STROKE VOLUME: 36.3 CM3
DOP CALCLVOT PEAK VEL VTI: 10.5 CM
E WAVE DECELERATION TIME: 224 MSEC
E/A RATIO: 0.66
E/E' RATIO: 6 M/S
ECHO LV POSTERIOR WALL: 1.3 CM (ref 0.6–1.1)
EGFR (NO RACE VARIABLE) (RUSH/TITUS): 66 ML/MIN/1.73M2
EJECTION FRACTION: 60 %
EOSINOPHIL # BLD AUTO: 0.26 K/UL (ref 0–0.5)
EOSINOPHIL NFR BLD AUTO: 3.1 % (ref 1–4)
ERYTHROCYTE [DISTWIDTH] IN BLOOD BY AUTOMATED COUNT: 13.9 % (ref 11.5–14.5)
EST. AVERAGE GLUCOSE BLD GHB EST-MCNC: 123 MG/DL
FRACTIONAL SHORTENING: 33.3 % (ref 28–44)
GLOBULIN SER-MCNC: 2.6 G/DL (ref 2–4)
GLUCOSE SERPL-MCNC: 103 MG/DL (ref 82–115)
GLUCOSE SERPL-MCNC: 107 MG/DL (ref 70–105)
HBA1C MFR BLD HPLC: 5.9 %
HCT VFR BLD AUTO: 39.9 % (ref 40–54)
HGB BLD-MCNC: 13.1 G/DL (ref 13.5–18)
IMM GRANULOCYTES # BLD AUTO: 0.02 K/UL (ref 0–0.04)
IMM GRANULOCYTES NFR BLD: 0.2 % (ref 0–0.4)
INR BLD: 1.04
INTERVENTRICULAR SEPTUM: 1.2 CM (ref 0.6–1.1)
IVC DIAMETER: 2.04 CM
LEFT ATRIUM AREA SYSTOLIC (APICAL 4 CHAMBER): 20.24 CM2
LEFT ATRIUM SIZE: 3.8 CM
LEFT INTERNAL DIMENSION IN SYSTOLE: 3 CM (ref 2.1–4)
LEFT VENTRICLE DIASTOLIC VOLUME INDEX: 40.36 ML/M2
LEFT VENTRICLE DIASTOLIC VOLUME: 90 ML
LEFT VENTRICLE END SYSTOLIC VOLUME APICAL 4 CHAMBER: 58.11 ML
LEFT VENTRICLE MASS INDEX: 94.2 G/M2
LEFT VENTRICLE SYSTOLIC VOLUME INDEX: 16.1 ML/M2
LEFT VENTRICLE SYSTOLIC VOLUME: 36 ML
LEFT VENTRICULAR INTERNAL DIMENSION IN DIASTOLE: 4.5 CM (ref 3.5–6)
LEFT VENTRICULAR MASS: 210.2 G
LV LATERAL E/E' RATIO: 4.9 M/S
LV SEPTAL E/E' RATIO: 8.8 M/S
LVED V (TEICH): 89.87 ML
LVES V (TEICH): 36.23 ML
LVOT MG: 0.96 MMHG
LVOT MV: 0.46 CM/S
LYMPHOCYTES # BLD AUTO: 3.06 K/UL (ref 1–4.8)
LYMPHOCYTES NFR BLD AUTO: 37 % (ref 27–41)
MAGNESIUM SERPL-MCNC: 2.1 MG/DL (ref 1.6–2.6)
MCH RBC QN AUTO: 29.6 PG (ref 27–31)
MCHC RBC AUTO-ENTMCNC: 32.8 G/DL (ref 32–36)
MCV RBC AUTO: 90.3 FL (ref 80–96)
MONOCYTES # BLD AUTO: 0.87 K/UL (ref 0–0.8)
MONOCYTES NFR BLD AUTO: 10.5 % (ref 2–6)
MPC BLD CALC-MCNC: 10.5 FL (ref 9.4–12.4)
MV PEAK A VEL: 0.67 M/S
MV PEAK E VEL: 0.44 M/S
MV STENOSIS PRESSURE HALF TIME: 64.9 MS
MV VALVE AREA P 1/2 METHOD: 3.39 CM2
NEUTROPHILS # BLD AUTO: 4 K/UL (ref 1.8–7.7)
NEUTROPHILS NFR BLD AUTO: 48.6 % (ref 53–65)
NRBC # BLD AUTO: 0 X10E3/UL
NRBC, AUTO (.00): 0 %
OHS CV RV/LV RATIO: 1.16 CM
PHOSPHATE SERPL-MCNC: 4.6 MG/DL (ref 2.3–4.7)
PLATELET # BLD AUTO: 209 K/UL (ref 150–400)
POTASSIUM SERPL-SCNC: 3.9 MMOL/L (ref 3.5–5.1)
PROT SERPL-MCNC: 6.2 G/DL (ref 5.8–7.6)
PROTHROMBIN TIME: 13.5 SECONDS (ref 11.7–14.7)
PV PEAK GRADIENT: 2 MMHG
PV PEAK VELOCITY: 0.66 M/S
RA MAJOR: 4.81 CM
RA PRESSURE ESTIMATED: 15 MMHG
RBC # BLD AUTO: 4.42 M/UL (ref 4.6–6.2)
RIGHT VENTRICLE DIASTOLIC BASEL DIMENSION: 5.2 CM
RIGHT VENTRICLE DIASTOLIC LENGTH: 5.7 CM
RIGHT VENTRICLE DIASTOLIC MID DIMENSION: 5 CM
RIGHT VENTRICULAR LENGTH IN DIASTOLE (APICAL 4-CHAMBER VIEW): 5.65 CM
RV MID DIAMA: 4.98 CM
SODIUM SERPL-SCNC: 139 MMOL/L (ref 136–145)
TDI LATERAL: 0.09 M/S
TDI SEPTAL: 0.05 M/S
TDI: 0.07 M/S
TRICUSPID ANNULAR PLANE SYSTOLIC EXCURSION: 2.51 CM
TROPONIN I SERPL HS-MCNC: <2.7 NG/L
WBC # BLD AUTO: 8.26 K/UL (ref 4.5–11)
Z-SCORE OF LEFT VENTRICULAR DIMENSION IN END DIASTOLE: -5.6
Z-SCORE OF LEFT VENTRICULAR DIMENSION IN END SYSTOLE: -3.68

## 2025-03-04 PROCEDURE — G0378 HOSPITAL OBSERVATION PER HR: HCPCS

## 2025-03-04 PROCEDURE — 92610 EVALUATE SWALLOWING FUNCTION: CPT

## 2025-03-04 PROCEDURE — 25000003 PHARM REV CODE 250: Performed by: INTERNAL MEDICINE

## 2025-03-04 PROCEDURE — 82962 GLUCOSE BLOOD TEST: CPT

## 2025-03-04 PROCEDURE — 85610 PROTHROMBIN TIME: CPT | Performed by: INTERNAL MEDICINE

## 2025-03-04 PROCEDURE — 80053 COMPREHEN METABOLIC PANEL: CPT | Performed by: INTERNAL MEDICINE

## 2025-03-04 PROCEDURE — 99900035 HC TECH TIME PER 15 MIN (STAT)

## 2025-03-04 PROCEDURE — 92523 SPEECH SOUND LANG COMPREHEN: CPT

## 2025-03-04 PROCEDURE — 27000221 HC OXYGEN, UP TO 24 HOURS

## 2025-03-04 PROCEDURE — 99239 HOSP IP/OBS DSCHRG MGMT >30: CPT | Mod: ,,, | Performed by: HOSPITALIST

## 2025-03-04 PROCEDURE — 94799 UNLISTED PULMONARY SVC/PX: CPT

## 2025-03-04 PROCEDURE — 83735 ASSAY OF MAGNESIUM: CPT | Performed by: INTERNAL MEDICINE

## 2025-03-04 PROCEDURE — 36415 COLL VENOUS BLD VENIPUNCTURE: CPT | Performed by: INTERNAL MEDICINE

## 2025-03-04 PROCEDURE — 97165 OT EVAL LOW COMPLEX 30 MIN: CPT

## 2025-03-04 PROCEDURE — 84100 ASSAY OF PHOSPHORUS: CPT | Performed by: INTERNAL MEDICINE

## 2025-03-04 PROCEDURE — 25500020 PHARM REV CODE 255: Performed by: HOSPITALIST

## 2025-03-04 PROCEDURE — 94761 N-INVAS EAR/PLS OXIMETRY MLT: CPT

## 2025-03-04 PROCEDURE — 85730 THROMBOPLASTIN TIME PARTIAL: CPT | Performed by: INTERNAL MEDICINE

## 2025-03-04 PROCEDURE — 85025 COMPLETE CBC W/AUTO DIFF WBC: CPT | Performed by: INTERNAL MEDICINE

## 2025-03-04 PROCEDURE — 84484 ASSAY OF TROPONIN QUANT: CPT | Performed by: INTERNAL MEDICINE

## 2025-03-04 RX ORDER — ASPIRIN 81 MG/1
81 TABLET ORAL DAILY
Qty: 21 TABLET | Refills: 0 | Status: SHIPPED | OUTPATIENT
Start: 2025-03-05 | End: 2025-03-05 | Stop reason: SDUPTHER

## 2025-03-04 RX ORDER — ATORVASTATIN CALCIUM 40 MG/1
40 TABLET, FILM COATED ORAL DAILY
Qty: 90 TABLET | Refills: 0 | Status: SHIPPED | OUTPATIENT
Start: 2025-03-04 | End: 2025-03-05 | Stop reason: SDUPTHER

## 2025-03-04 RX ORDER — CLOPIDOGREL BISULFATE 75 MG/1
75 TABLET ORAL DAILY
Qty: 21 TABLET | Refills: 0 | Status: SHIPPED | OUTPATIENT
Start: 2025-03-04 | End: 2025-03-05 | Stop reason: SDUPTHER

## 2025-03-04 RX ORDER — SEMAGLUTIDE 0.68 MG/ML
0.5 INJECTION, SOLUTION SUBCUTANEOUS
Qty: 3 ML | Refills: 0 | Status: SHIPPED | OUTPATIENT
Start: 2025-04-04 | End: 2025-03-05 | Stop reason: SDUPTHER

## 2025-03-04 RX ORDER — SEMAGLUTIDE 0.68 MG/ML
0.25 INJECTION, SOLUTION SUBCUTANEOUS
Qty: 1.5 ML | Refills: 0 | Status: SHIPPED | OUTPATIENT
Start: 2025-03-04 | End: 2025-03-05 | Stop reason: SDUPTHER

## 2025-03-04 RX ADMIN — PERFLUTREN 1.5 ML: 6.52 INJECTION, SUSPENSION INTRAVENOUS at 08:03

## 2025-03-04 RX ADMIN — CETIRIZINE HYDROCHLORIDE 10 MG: 10 TABLET, FILM COATED ORAL at 09:03

## 2025-03-04 RX ADMIN — FAMOTIDINE 20 MG: 20 TABLET, FILM COATED ORAL at 12:03

## 2025-03-04 RX ADMIN — FAMOTIDINE 20 MG: 20 TABLET, FILM COATED ORAL at 09:03

## 2025-03-04 RX ADMIN — CLOPIDOGREL BISULFATE 75 MG: 75 TABLET ORAL at 09:03

## 2025-03-04 NOTE — ASSESSMENT & PLAN NOTE
Body mass index is 35.44 kg/m². Morbid obesity complicates all aspects of disease management from diagnostic modalities to treatment. Weight loss encouraged and health benefits explained to patient.

## 2025-03-04 NOTE — DISCHARGE SUMMARY
[facility] - Emergency Department  Hospital Medicine  Discharge Summary      Patient Name: Chano Clement  MRN: [id number]  CALLI: [id number]  Patient Class: OP- Observation  Admission Date: 3/3/2025  Hospital Length of Stay: 1 days  Discharge Date and Time:  03/04/2025 2:47 PM  Attending Physician: Grecia Valle MD   Discharging Provider: Grecia Valle MD  Primary Care Provider: Calista Wright FNP    Primary Care Team: Networked reference to record PCT     HPI:   Patient is a 74-year-old male with a history of essential hypertension, hyperlipidemia, GERD, TIA, and RAYMUNDO on CPAP who presents to the emergency room complaining of double vision, right upper extremity weakness and numbness along with bilateral lower extremity weakness R>>L.  These symptoms started at around 8:00 a.m. and patient took 4 aspirins to see if these symptoms with resolve however it persisted which prompted ED visit at around 4:00 p.m. Fortunately, by the time patient arrived in ED these symptoms completely resolved.     On initial presentation, vital signs were stable and patient was afebrile.  Initial workup including CT of the head, CTA of head and neck were unremarkable.  Tele neurology was consulted and recommendations were made which we will follow.  Patient will be admitted for observation with a working diagnosis of TIA    * No surgery found *      Hospital Course:   No notes on file     Goals of Care Treatment Preferences:  Code Status: Full Code         Consults:   Consults (From admission, onward)          Status Ordering Provider     IP consult to case management/social work  Once        Provider:  (Not yet assigned)    Completed ZAKI, MIN S     Consult to Telemedicine - Acute Stroke  Once        Provider:  Loreta Wylie MD    Acknowledged RADHA PARADA            * TIA (transient ischemic attack)      Presentation concerning for TIA/minor stroke.  Out of window for thrombolytics, and also with  NIHSS of 0     - MRI brain w/o contrast to evaluate for presence of and characterization of infarct  - Load with clopidogrel 300 mg, followed by ASA 81 and clopidogrel 75 mg daily.  - Lipitor 80 mg daily  - Transothoracic echocardiogram  - Lipid profile and A1c for evaluation of modifiable risk factors  - PT/OT/SLP eval and Rx  - Permissive HTN < 220/120 mmHg x 48-72h    3/4: MRI negative, CTA head and neck negative.   F/u with Dr. Cesar within 2 weeks.  F/u with PCP within 1 week.    Aspirin for at least 21 days, Plavix for at least 21 days.  High intensity statin.   Ozempic ordered.     Obesity    Body mass index is 35.44 kg/m². Morbid obesity complicates all aspects of disease management from diagnostic modalities to treatment. Weight loss encouraged and health benefits explained to patient.         Mild intermittent asthma without complication    Continue present management with albuterol MDI on p.r.n. basis      Benign localized prostatic hyperplasia with lower urinary tract symptoms (LUTS)    Patient's symptoms are adequately controlled on Uroxatral.  Will continue present management        Prediabetes    Patient is on metformin.  Will check hemoglobin A1c.  In the meantime will start patient on low intensity sliding scale insulin to maintain CBGs in the range of 130s to 180s      RAYMUNDO on CPAP    Will continue CPAP q.h.s. at home settings      Other hyperlipidemia    Will start patient on Lipitor 80 mg p.o. q.h.s.      Essential (primary) hypertension    Will hold off on antihypertensives and allow for permissive hypertension for now      Final Active Diagnoses:    Diagnosis Date Noted POA    PRINCIPAL PROBLEM:  TIA (transient ischemic attack) [G45.9] 03/03/2025 Yes    Obesity [E66.9] 03/03/2025 Yes    Testosterone deficiency in male [E29.1] 12/02/2024 Yes    Mild intermittent asthma without complication [J45.20] 11/21/2024 Yes     Chronic    Benign localized prostatic hyperplasia with lower urinary tract  symptoms (LUTS) [N40.1] 07/08/2024 Yes     Chronic    Prediabetes [R73.03] 04/19/2023 Yes     Chronic    Severe obesity (BMI 35.0-35.9 with comorbidity) [E66.01, Z68.35]  Not Applicable     Chronic    RAYMUNDO on CPAP [G47.33] 04/12/2022 Yes     Chronic    Generalized anxiety disorder [F41.1] 04/12/2022 Yes     Chronic    Essential (primary) hypertension [I10] 07/23/2021 Yes     Chronic    Other hyperlipidemia [E78.49] 07/23/2021 Yes     Chronic    Recurrent depression [F33.9] 07/23/2021 Yes     Chronic      Problems Resolved During this Admission:       Discharged Condition: fair    Disposition: Home or Self Care    Follow Up:   Follow-up Information       Calista Wright FNP Follow up in 1 week(s).    Specialty: Family Medicine  Why: hospital follow-up  Contact information:  [address]  [facility] [place] [address]  [phone]               Bean Cesar MD Follow up in 2 week(s).    Specialty: Neurology  Why: hospital follow-up for stroke  Contact information:  [address] [facility] [place] [address]  [phone]                           Patient Instructions:      Diet diabetic       Significant Diagnostic Studies: Labs: BMP:   Recent Labs   Lab 03/03/25  1720 03/04/25  0438   GLU 92 103    139   K 4.1 3.9    107   CO2 24 25   BUN 21 18   CREATININE 1.08 1.15   CALCIUM 8.7* 9.0   MG  --  2.1    and CBC   Recent Labs   Lab 03/03/25  1720 03/04/25  0438   WBC 8.07 8.26   HGB 13.0* 13.1*   HCT 40.8 39.9*    209       Pending Diagnostic Studies:       None           Medications:  Reconciled Home Medications:      Medication List        START taking these medications      atorvastatin 40 MG tablet  Commonly known as: LIPITOR  Take 1 tablet (40 mg total) by mouth once daily.     clopidogreL 75 mg tablet  Commonly known as: PLAVIX  Take 1 tablet (75 mg total) by mouth once daily.     * OZEMPIC 0.25 mg or 0.5 mg (2 mg/3 mL) pen injector  Generic drug: semaglutide  Inject 0.25 mg  into the skin every 7 days.     * OZEMPIC 0.25 mg or 0.5 mg (2 mg/3 mL) pen injector  Generic drug: semaglutide  Inject 0.5 mg into the skin every 7 days.  Start taking on: April 4, 2025           * This list has 2 medication(s) that are the same as other medications prescribed for you. Read the directions carefully, and ask your doctor or other care provider to review them with you.                CHANGE how you take these medications      aspirin 81 MG EC tablet  Commonly known as: ECOTRIN  Take 1 tablet (81 mg total) by mouth once daily. for 21 days  Start taking on: March 5, 2025  What changed:   medication strength  how much to take  when to take this            CONTINUE taking these medications      albuterol 90 mcg/actuation inhaler  Commonly known as: VENTOLIN HFA  Inhale 2 puffs into the lungs every 4 (four) hours as needed for Wheezing or Shortness of Breath. Rescue     alfuzosin 10 mg Tb24  Commonly known as: UROXATRAL  Take 1 tablet (10 mg total) by mouth daily with breakfast.     cetirizine 10 MG tablet  Commonly known as: ZYRTEC  Take 1 tablet (10 mg total) by mouth once daily.     cyclobenzaprine 10 MG tablet  Commonly known as: FLEXERIL  Take 1 tablet (10 mg total) by mouth nightly as needed for Muscle spasms.     diclofenac sodium 1 % Gel  Commonly known as: VOLTAREN  Apply to back four times daily as needed for pain     DULoxetine 60 MG capsule  Commonly known as: CYMBALTA  Take 1 capsule (60 mg total) by mouth 2 (two) times daily.     fish oil-omega-3 fatty acids 300-1,000 mg capsule  Take 1 capsule by mouth once daily.     gabapentin 600 MG tablet  Commonly known as: NEURONTIN  Take 2 tablets (1,200 mg total) by mouth 2 (two) times daily.     HYDROcodone-acetaminophen  mg per tablet  Commonly known as: NORCO  Take 1 tablet by mouth 3 (three) times daily as needed for Pain.     lisinopriL 40 MG tablet  Commonly known as: PRINIVIL,ZESTRIL  Take 1 tablet (40 mg total) by mouth once daily.      metFORMIN 500 MG ER 24hr tablet  Commonly known as: GLUCOPHAGE-XR  Take 2 tablets (1,000 mg total) by mouth Daily.     multivitamin per tablet  Commonly known as: THERAGRAN  Take 1 tablet by mouth once daily.     pramipexole 0.25 MG tablet  Commonly known as: MIRAPEX  Take 1 tablet (0.25 mg total) by mouth every evening.     traZODone 50 MG tablet  Commonly known as: DESYREL  Take 4 tablets (200 mg total) by mouth every evening.            STOP taking these medications      rosuvastatin 20 MG tablet  Commonly known as: CRESTOR              Indwelling Lines/Drains at time of discharge:   Lines/Drains/Airways       None                   Time spent on the discharge of patient: 45 minutes         Grecia Valle MD  Department of Hospital Medicine  [facility] - Emergency Department

## 2025-03-04 NOTE — ASSESSMENT & PLAN NOTE
Patient is on metformin.  Will check hemoglobin A1c.  In the meantime will start patient on low intensity sliding scale insulin to maintain CBGs in the range of 130s to 180s

## 2025-03-04 NOTE — ASSESSMENT & PLAN NOTE
Presentation concerning for TIA/minor stroke.  Out of window for thrombolytics, and also with NIHSS of 0     - MRI brain w/o contrast to evaluate for presence of and characterization of infarct  - Load with clopidogrel 300 mg, followed by ASA 81 and clopidogrel 75 mg daily.  - Lipitor 80 mg daily  - Transothoracic echocardiogram  - Lipid profile and A1c for evaluation of modifiable risk factors  - PT/OT/SLP eval and Rx  - Permissive HTN < 220/120 mmHg x 48-72h

## 2025-03-04 NOTE — PHARMACY MED REC
"Admission Medication History     The home medication history was taken by Elissa Crump.    You may go to "Admission" then "Reconcile Home Medications" tabs to review and/or act upon these items.     The home medication list has been updated by the Pharmacy department.   Please read ALL comments highlighted in yellow.   Please address this information as you see fit.    Feel free to contact us if you have any questions or require assistance.    Aspirin 325 mg, was updated to daily from three days a week.       The medications listed below were removed from the home medication list. Please reorder if appropriate:  Patient reports no longer taking the following medication(s):  Testosterone 20.25 mg/1.25 gram (1.62%)        Medications listed below were obtained from: Patient/family, Analytic software- [employer], and Medical records  (Not in a hospital admission)        Current Outpatient Medications on File Prior to Encounter   Medication Sig Dispense Refill Last Dose/Taking    albuterol (VENTOLIN HFA) 90 mcg/actuation inhaler Inhale 2 puffs into the lungs every 4 (four) hours as needed for Wheezing or Shortness of Breath. Rescue 18 g 1 Taking As Needed    alfuzosin (UROXATRAL) 10 mg Tb24 Take 1 tablet (10 mg total) by mouth daily with breakfast. 90 tablet 3 3/2/2025    aspirin (ECOTRIN) 325 MG EC tablet Take 325 mg by mouth once daily. Takes on Monday, Wednesday, and Friday   3/3/2025    cetirizine (ZYRTEC) 10 MG tablet Take 1 tablet (10 mg total) by mouth once daily. 90 tablet 3 3/2/2025    cyclobenzaprine (FLEXERIL) 10 MG tablet Take 1 tablet (10 mg total) by mouth nightly as needed for Muscle spasms. 90 tablet 1 3/2/2025    diclofenac sodium (VOLTAREN) 1 % Gel Apply to back four times daily as needed for pain 900 g 11 Taking    DULoxetine (CYMBALTA) 60 MG capsule Take 1 capsule (60 mg total) by mouth 2 (two) times daily. 180 capsule 3 3/2/2025    gabapentin (NEURONTIN) 600 MG tablet Take 2 tablets (1,200 mg total) " by mouth 2 (two) times daily. 360 tablet 1 3/2/2025    HYDROcodone-acetaminophen (NORCO)  mg per tablet Take 1 tablet by mouth 3 (three) times daily as needed for Pain. 90 tablet 0 3/2/2025    lisinopriL (PRINIVIL,ZESTRIL) 40 MG tablet Take 1 tablet (40 mg total) by mouth once daily. 90 tablet 3 3/2/2025    metFORMIN (GLUCOPHAGE-XR) 500 MG ER 24hr tablet Take 2 tablets (1,000 mg total) by mouth Daily. (Patient taking differently: Take 500 mg by mouth 2 (two) times a day.) 180 tablet 3 3/2/2025    multivitamin (THERAGRAN) per tablet Take 1 tablet by mouth once daily.   3/2/2025    omega-3 fatty acids/fish oil (FISH OIL-OMEGA-3 FATTY ACIDS) 300-1,000 mg capsule Take 1 capsule by mouth once daily.   3/2/2025    pramipexole (MIRAPEX) 0.25 MG tablet Take 1 tablet (0.25 mg total) by mouth every evening. 90 tablet 3 3/2/2025    rosuvastatin (CRESTOR) 20 MG tablet Take 1 tablet (20 mg total) by mouth once daily. 90 tablet 3 3/2/2025    traZODone (DESYREL) 50 MG tablet Take 4 tablets (200 mg total) by mouth every evening. 360 tablet 3 3/2/2025       Potential issues to be addressed PRIOR TO DISCHARGE  N/A    Elissa Crump  EXT 1915                 .

## 2025-03-04 NOTE — HPI
Patient is a 74-year-old male with a history of essential hypertension, hyperlipidemia, GERD, TIA, and RAYMUNDO on CPAP who presents to the emergency room complaining of double vision, right upper extremity weakness and numbness along with bilateral lower extremity weakness R>>L.  These symptoms started at around 8:00 a.m. and patient took 4 aspirins to see if these symptoms with resolve however it persisted which prompted ED visit at around 4:00 p.m. Fortunately, by the time patient arrived in ED these symptoms completely resolved.     On initial presentation, vital signs were stable and patient was afebrile.  Initial workup including CT of the head, CTA of head and neck were unremarkable.  Tele neurology was consulted and recommendations were made which we will follow.  Patient will be admitted for observation with a working diagnosis of TIA

## 2025-03-04 NOTE — ED NOTES
Rec'vd report from [facility].  Pt laying on bed in cc1.  Resting with eyes closed but easily awakens.  Pt denies any c/o blurred vision, double vision or trouble seeing at this time.  NIH completed at this time and is 0 at this time.  Denies any c/o pain or discomfort.  Family at bedside and call light left in reach.

## 2025-03-04 NOTE — ASSESSMENT & PLAN NOTE
>>ASSESSMENT AND PLAN FOR OBESITY WRITTEN ON 3/3/2025  7:09 PM BY COURT HAINES MD      Body mass index is 35.44 kg/m². Morbid obesity complicates all aspects of disease management from diagnostic modalities to treatment. Weight loss encouraged and health benefits explained to patient.

## 2025-03-04 NOTE — ASSESSMENT & PLAN NOTE
Presentation concerning for TIA/minor stroke.  Out of window for thrombolytics, and also with NIHSS of 0     - MRI brain w/o contrast to evaluate for presence of and characterization of infarct  - Load with clopidogrel 300 mg, followed by ASA 81 and clopidogrel 75 mg daily.  - Lipitor 80 mg daily  - Transothoracic echocardiogram  - Lipid profile and A1c for evaluation of modifiable risk factors  - PT/OT/SLP eval and Rx  - Permissive HTN < 220/120 mmHg x 48-72h    3/4: MRI negative, CTA head and neck negative.   F/u with Dr. Cesar within 2 weeks.  F/u with PCP within 1 week.    Aspirin for at least 21 days, Plavix for at least 21 days.  High intensity statin.   Ozempic ordered.

## 2025-03-04 NOTE — PLAN OF CARE
Problem: Noninvasive Ventilation Acute  Goal: Effective Unassisted Ventilation and Oxygenation  3/3/2025 2246 by Mary Richards, RRT  Outcome: Progressing  3/3/2025 2246 by Mary Richards, RRT  Outcome: Progressing

## 2025-03-04 NOTE — PT/OT/SLP EVAL
Speech Language Pathology Evaluation  Cognitive/Bedside Swallow    Patient Name:  Chano Clement   MRN:  [id number]  Admitting Diagnosis: TIA (transient ischemic attack)    Recommendations:                  General Recommendations:  Follow-up not indicated  Diet recommendations:  Regular, Thin   Aspiration Precautions: 1 bite/sip at a time, HOB to 90 degrees, Remain upright 30 minutes post meal, Small bites/sips, and Standard aspiration precautions   General Precautions: Standard,    Communication strategies:   Patient able to answer simple questions and follow simple commands.    Assessment:     Chano Clement is a 74 y.o. male with an SLP diagnosis of  TIA .  He presents with no difficulties with swallowing and speech/lang/cognition at baseline.    History:     Past Medical History:   Diagnosis Date    Anxiety disorder, unspecified     Chronic bilateral low back pain with left-sided sciatica 04/12/2022    Chronic rhinitis 04/12/2022    Depressive disorder     Diabetic eye exam 09/20/2023    Dr. Raymond [facility]    Essential (primary) hypertension     GERD (gastroesophageal reflux disease)     History of colon polyps 04/12/2022    Hyperlipidemia     Laceration of left wrist 11/21/2023    RAYMUNDO on CPAP     Prediabetes 04/19/2023    Lab Results Component Value Date  HGBA1C 6.3 04/19/2023      RLS (restless legs syndrome)     TIA (transient ischemic attack)        Past Surgical History:   Procedure Laterality Date    BACK SURGERY      Caudal HARPER  02/18/2013    Dr Rdz    FOOT SURGERY Left     Left l4-L5 TFESI Left 06/20/2012    Dr Rdz    MOLE REMOVAL      NERVE REPAIR Left 11/21/2023    Procedure: REPAIR, SUPERFICIAL BRANCH OF RADIAL   NERVE;  Surgeon: Val Clifton MD;  Location: [facility] OR;  Service: General;  Laterality: Left;    REPAIR OF TENDON OF UPPER EXTREMITY Left 11/21/2023    Procedure: REPAIR BRACHIORADIALIS TENDON;  Surgeon: Val Clifton MD;  Location: [facility] OR;   Service: General;  Laterality: Left;    REPAIR, ARTERY, RADIAL Left 11/21/2023    Procedure: REPAIR/LIGATION, ARTERY, RADIAL;  Surgeon: Val Clifton MD;  Location: [facility];  Service: General;  Laterality: Left;    SHOULDER ARTHROSCOPY Bilateral     T3-T4 HARPER  02/27/2012    Dr Rdz    TONSILLECTOMY      UVULECTOMY         Social History: Patient lives with his wife.    Prior Intubation HX:  n/a    Modified Barium Swallow: n/a    Chest X-Rays: see chart    Prior diet: regular consistency foods at home.    Occupation/hobbies/homemaking: retired.    Subjective     Patient lying in bed. Patient's wife present at bedside. Patient agreeable to SPEECH THERAPY evaluation.   Patient goals: to go home     Pain/Comfort:  Pain Rating 1: 0/10 (No pain reported by pt.)    Respiratory Status: Room air    Objective:     Cognitive Status:    Patient able to answer questions and follow simple commands.        Receptive Language:   Comprehension:      [facility]    Pragmatics:    normal    Expressive Language:  Verbal:    Patient able to participate in conversational speech.        Motor Speech:  [facility]    Voice:   [facility]        Oral Musculature Evaluation  Secretion Management: adequate  Mucosal Quality: adequate  Oral Labial Strength and Mobility: [facility]  Lingual Strength and Mobility: [facility]    Bedside Swallow Eval:   Consistencies Assessed:  Thin liquids No difficulties noted or reported by patient/his wife.  Mixed consistencies No difficulties noted or reported by patient/his wife.      Oral Phase:   [facility]    Pharyngeal Phase:   no overt clinical signs/symptoms of aspiration  no overt clinical signs/symptoms of pharyngeal dysphagia    Compensatory Strategies  None    Treatment: Rec continue regular consistency diet with thin liquids as tolerated. Speech/language/cognition all at baseline.     Goals:   Multidisciplinary Problems       SLP Goals       Not on file                    Plan:     Patient to be seen:      Plan of Care expires:      Plan of Care reviewed with:      SLP Follow-Up:  No       Discharge recommendations:      Barriers to Discharge:  None    Time Tracking:     SLP Treatment Date:      Speech Start Time:  1155  Speech Stop Time:  1212     Speech Total Time (min):  17 min    Billable Minutes: Eval 10  and Eval Swallow and Oral Function 7    03/04/2025

## 2025-03-04 NOTE — PT/OT/SLP EVAL
Occupational Therapy   Evaluation and Discharge Note    Name: Chano Clement  MRN: [id number]  Admitting Diagnosis: TIA (transient ischemic attack)  Recent Surgery: * No surgery found *      Recommendations:     Discharge Recommendations: No Therapy Indicated  Discharge Equipment Recommendations: none  Barriers to discharge:  None    Assessment:     Chano Clement is a 74 y.o. male with a medical diagnosis of TIA (transient ischemic attack). At this time, patient is functioning at their prior level of function and does not require further acute OT services.     Plan:     During this hospitalization, patient does not require further acute OT services.  Please re-consult if situation changes.    Plan of Care Reviewed with: patient    Subjective     Chief Complaint: TIA  Patient/Family Comments/goals: pt agreeable to OT eval    Occupational Profile:  Living Environment: pt lives with wife in one story home with 3 steps to enter with handrails  Previous level of function: independent with all ADL tasks, IADL tasks, and functional mobility   Roles and Routines: perform self care  Equipment Used at home: crutches, axillary  Assistance upon Discharge: home with family assist    Pain/Comfort:  Pain Rating 1: 0/10    Patients cultural, spiritual, Synagogue conflicts given the current situation: no    Objective:     Communicated with: MIC Cono prior to session.  Patient found HOB elevated with blood pressure cuff, peripheral IV, pulse ox (continuous), telemetry upon OT entry to room.    General Precautions: Standard, fall  Orthopedic Precautions: N/A  Braces: N/A  Respiratory Status: Room air     Occupational Performance:    Bed Mobility:    Patient completed Supine to Sit with stand by assistance  Patient completed Sit to Supine with stand by assistance    Functional Mobility/Transfers:  Patient completed Sit <> Stand Transfer with contact guard assistance  with  no assistive device   Functional Mobility: pt performed  functional mobility into hallway and back to bed with CGA with no AD    Activities of Daily Living:  Lower Body Dressing: contact guard assistance to jace shoes while standing    Cognitive/Visual Perceptual:  Cognitive/Psychosocial Skills:     -       Oriented to: Person, Place, Time, and Situation   -       Follows Commands/attention:Follows multistep  commands  -       Mood/Affect/Coping skills/emotional control: Cooperative    Physical Exam:  Balance:    -       sitting balance SBA; standing balance CGA  Upper Extremity Range of Motion:     -       Right Upper Extremity: WFL  -       Left Upper Extremity: WFL  Upper Extremity Strength:    -       Right Upper Extremity: WFL  -       Left Upper Extremity: WFL  Gross motor coordination:   WFL    AMPAC 6 Click ADL:  AMPAC Total Score: 24    Treatment & Education:  Pt educated on OT POC.     Patient left HOB elevated with all lines intact and call button in reach    GOALS:   Multidisciplinary Problems       Occupational Therapy Goals       Not on file                    DME Justifications:  No DME recommended requiring DME justifications    History:     Past Medical History:   Diagnosis Date    Anxiety disorder, unspecified     Chronic bilateral low back pain with left-sided sciatica 04/12/2022    Chronic rhinitis 04/12/2022    Depressive disorder     Diabetic eye exam 09/20/2023    Dr. Raymond [facility]    Essential (primary) hypertension     GERD (gastroesophageal reflux disease)     History of colon polyps 04/12/2022    Hyperlipidemia     Laceration of left wrist 11/21/2023    RAYMUNDO on CPAP     Prediabetes 04/19/2023    Lab Results Component Value Date  HGBA1C 6.3 04/19/2023      RLS (restless legs syndrome)     TIA (transient ischemic attack)          Past Surgical History:   Procedure Laterality Date    BACK SURGERY      Caudal HARPER  02/18/2013    Dr Rdz    FOOT SURGERY Left     Left l4-L5 TFESI Left 06/20/2012    Dr Rdz    MOLE REMOVAL       NERVE REPAIR Left 11/21/2023    Procedure: REPAIR, SUPERFICIAL BRANCH OF RADIAL   NERVE;  Surgeon: Val Clifton MD;  Location: [facility];  Service: General;  Laterality: Left;    REPAIR OF TENDON OF UPPER EXTREMITY Left 11/21/2023    Procedure: REPAIR BRACHIORADIALIS TENDON;  Surgeon: Val Clifton MD;  Location: [facility];  Service: General;  Laterality: Left;    REPAIR, ARTERY, RADIAL Left 11/21/2023    Procedure: REPAIR/LIGATION, ARTERY, RADIAL;  Surgeon: Val Clifton MD;  Location: [facility];  Service: General;  Laterality: Left;    SHOULDER ARTHROSCOPY Bilateral     T3-T4 HARPER  02/27/2012    Dr Rdz    TONSILLECTOMY      UVULECTOMY         Time Tracking:     OT Date of Treatment: 03/04/25  OT Start Time: 0916  OT Stop Time: 0923  OT Total Time (min): 7 min    Billable Minutes:Evaluation OT min complexity eval    3/4/2025

## 2025-03-04 NOTE — H&P
[facility] - Emergency Department  [facility] Medicine  History & Physical    Patient Name: Chano Clement  MRN: [id number]  Patient Class: Emergency  Admission Date: 3/3/2025  Attending Physician:  JEREMY Phelps DO  Primary Care Provider: Calista Wright FNP         Patient information was obtained from patient and ER records.     Subjective:     Principal Problem:TIA (transient ischemic attack)    Chief Complaint:   Chief Complaint   Patient presents with    Eye Problem     Pt presents to ed with c/o having a white flash in his vision this morning when he woke up at 0800 this morning. States his eyes are sensitive to light and he is having light headness         HPI: Patient is a 74-year-old male with a history of essential hypertension, hyperlipidemia, GERD, TIA, and RAYMUNDO on CPAP who presented to the emergency room complaining of double vision, right upper extremity weakness and numbness along with bilateral lower extremity weakness R>>L.  These symptoms started at around 8:00 a.m. upon waking up and patient took 4 aspirins to see if these symptoms would resolve.  However, it persisted which ultimately culminated in ED visit at around 4:00 p.m. Fortunately, by the time patient arrived in ED these symptoms completely resolved.     On initial presentation, vital signs were stable and patient was afebrile.  Initial workup including CT of the head, CTA of head and neck were unremarkable.  Tele neurology was consulted and recommendations were made which we will follow.  Patient will be admitted for observation with a working diagnosis of TIA    Past Medical History:   Diagnosis Date    Anxiety disorder, unspecified     Chronic bilateral low back pain with left-sided sciatica 04/12/2022    Chronic rhinitis 04/12/2022    Depressive disorder     Diabetic eye exam 09/20/2023    Dr. Raymond [facility]    Essential (primary) hypertension     GERD (gastroesophageal reflux disease)     History of colon  polyps 04/12/2022    Hyperlipidemia     Laceration of left wrist 11/21/2023    RAYMUNDO on CPAP     Prediabetes 04/19/2023    Lab Results Component Value Date  HGBA1C 6.3 04/19/2023      RLS (restless legs syndrome)     TIA (transient ischemic attack)        Past Surgical History:   Procedure Laterality Date    BACK SURGERY      Caudal HARPER  02/18/2013    Dr Rdz    FOOT SURGERY Left     Left l4-L5 TFESI Left 06/20/2012    Dr Rdz    MOLE REMOVAL      NERVE REPAIR Left 11/21/2023    Procedure: REPAIR, SUPERFICIAL BRANCH OF RADIAL   NERVE;  Surgeon: Val Clifton MD;  Location: [facility] OR;  Service: General;  Laterality: Left;    REPAIR OF TENDON OF UPPER EXTREMITY Left 11/21/2023    Procedure: REPAIR BRACHIORADIALIS TENDON;  Surgeon: Val Clifton MD;  Location: [facility] OR;  Service: General;  Laterality: Left;    REPAIR, ARTERY, RADIAL Left 11/21/2023    Procedure: REPAIR/LIGATION, ARTERY, RADIAL;  Surgeon: Val Clifton MD;  Location: [facility] OR;  Service: General;  Laterality: Left;    SHOULDER ARTHROSCOPY Bilateral     T3-T4 HARPER  02/27/2012    Dr Rdz    TONSILLECTOMY      UVULECTOMY         Review of patient's allergies indicates:  No Known Allergies    No current facility-administered medications on file prior to encounter.     Current Outpatient Medications on File Prior to Encounter   Medication Sig    alfuzosin (UROXATRAL) 10 mg Tb24 Take 1 tablet (10 mg total) by mouth daily with breakfast.    cyclobenzaprine (FLEXERIL) 10 MG tablet Take 1 tablet (10 mg total) by mouth nightly as needed for Muscle spasms.    diclofenac sodium (VOLTAREN) 1 % Gel Apply to back four times daily as needed for pain    DULoxetine (CYMBALTA) 60 MG capsule Take 1 capsule (60 mg total) by mouth 2 (two) times daily.    gabapentin (NEURONTIN) 600 MG tablet Take 2 tablets (1,200 mg total) by mouth 2 (two) times daily.    HYDROcodone-acetaminophen (NORCO)  mg per tablet Take 1 tablet by mouth 3 (three) times  daily as needed for Pain.    lisinopriL (PRINIVIL,ZESTRIL) 40 MG tablet Take 1 tablet (40 mg total) by mouth once daily.    metFORMIN (GLUCOPHAGE-XR) 500 MG ER 24hr tablet Take 2 tablets (1,000 mg total) by mouth Daily. (Patient taking differently: Take 500 mg by mouth 2 (two) times a day.)    pramipexole (MIRAPEX) 0.25 MG tablet Take 1 tablet (0.25 mg total) by mouth every evening.    rosuvastatin (CRESTOR) 20 MG tablet Take 1 tablet (20 mg total) by mouth once daily.    traZODone (DESYREL) 50 MG tablet Take 4 tablets (200 mg total) by mouth every evening.    albuterol (VENTOLIN HFA) 90 mcg/actuation inhaler Inhale 2 puffs into the lungs every 4 (four) hours as needed for Wheezing or Shortness of Breath. Rescue    aspirin (ECOTRIN) 325 MG EC tablet Take 325 mg by mouth once daily. Takes on Monday, Wednesday, and Friday    cetirizine (ZYRTEC) 10 MG tablet Take 1 tablet (10 mg total) by mouth once daily.    multivitamin (THERAGRAN) per tablet Take 1 tablet by mouth once daily.    omega-3 fatty acids/fish oil (FISH OIL-OMEGA-3 FATTY ACIDS) 300-1,000 mg capsule Take 1 capsule by mouth once daily.    [DISCONTINUED] NARCAN 4 mg/actuation Spry 1 spray by Nasal route daily as needed.    [DISCONTINUED] testosterone (ANDROGEL) 20.25 mg/1.25 gram (1.62 %) GlPm Place 2 Pump  onto the skin Daily. Apply topically to shoulders or upper arms, not to genitals or abdomen, each morning (Patient not taking: Reported on 2/25/2025)     Family History       Problem Relation (Age of Onset)    Arthritis Sister    Cancer Brother, Brother    Heart attack Father    Heart disease Mother, Father    Hyperlipidemia Son    Hypertension Son    No Known Problems Sister, Maternal Grandmother, Maternal Grandfather, Paternal Grandmother, Paternal Grandfather    Rheumatic fever Mother          Tobacco Use    Smoking status: Former     Current packs/day: 0.00     Average packs/day: 1 pack/day for 10.0 years (10.0 ttl pk-yrs)     Types: Cigarettes      Start date:      Quit date:      Years since quittin.2     Passive exposure: Never    Smokeless tobacco: Never    Tobacco comments:     Use to be a smoker, quit in . Stated he smoked for about 10 years   Substance and Sexual Activity    Alcohol use: Yes     Alcohol/week: 1.0 standard drink of alcohol     Types: 1 Cans of beer per week     Comment: Occasionally    Drug use: Never    Sexual activity: Yes     Partners: Female     Review of Systems   Constitutional:  Negative for chills, diaphoresis, fatigue and fever.   HENT:  Negative for congestion, hearing loss, nosebleeds, postnasal drip, sore throat, tinnitus and trouble swallowing.    Eyes:  Positive for visual disturbance. Negative for photophobia, pain, discharge and itching.   Respiratory:  Negative for cough, shortness of breath, wheezing and stridor.    Cardiovascular:  Negative for chest pain, palpitations and leg swelling.   Gastrointestinal:  Negative for abdominal distention, abdominal pain, anal bleeding, blood in stool, constipation, diarrhea, nausea and vomiting.   Endocrine: Negative for cold intolerance, heat intolerance, polydipsia, polyphagia and polyuria.   Genitourinary:  Negative for decreased urine volume, difficulty urinating, dysuria, flank pain, frequency, hematuria and urgency.   Musculoskeletal:  Negative for arthralgias, back pain, gait problem, joint swelling, myalgias, neck pain and neck stiffness.   Skin:  Negative for color change, pallor and rash.   Allergic/Immunologic: Negative for immunocompromised state.   Neurological:  Positive for dizziness. Negative for tremors, seizures, syncope, facial asymmetry, speech difficulty, light-headedness, numbness and headaches.        Patient endorsed right upper extremity numbness and weakness in bilateral lower extremity weakness R >>L   Hematological:  Negative for adenopathy. Does not bruise/bleed easily.     Objective:     Vital Signs (Most Recent):  Temp: 98.2 °F (36.8  °C) (03/03/25 1538)  Pulse: 71 (03/03/25 1840)  Resp: 15 (03/03/25 1840)  BP: (!) 115/59 (03/03/25 1840)  SpO2: (!) 94 % (03/03/25 1840) Vital Signs (24h Range):  Temp:  [98.2 °F (36.8 °C)] 98.2 °F (36.8 °C)  Pulse:  [71-76] 71  Resp:  [14-20] 15  SpO2:  [93 %-96 %] 94 %  BP: (113-132)/(59-75) 115/59     Weight: 108.9 kg (240 lb)  Body mass index is 35.44 kg/m².     Physical Exam  Vitals reviewed.   Constitutional:       General: He is not in acute distress.     Appearance: Normal appearance.   HENT:      Head: Normocephalic and atraumatic.      Right Ear: External ear normal.      Left Ear: External ear normal.   Eyes:      Extraocular Movements: Extraocular movements intact.      Pupils: Pupils are equal, round, and reactive to light.   Cardiovascular:      Rate and Rhythm: Normal rate and regular rhythm.      Pulses: Normal pulses.      Heart sounds: Normal heart sounds. No murmur heard.  Pulmonary:      Effort: Pulmonary effort is normal. No respiratory distress.      Breath sounds: Normal breath sounds. No wheezing.   Chest:      Chest wall: No tenderness.   Abdominal:      General: Abdomen is flat.      Palpations: Abdomen is soft. There is no mass.      Tenderness: There is no abdominal tenderness. There is no right CVA tenderness or left CVA tenderness.   Musculoskeletal:         General: No swelling or tenderness. Normal range of motion.   Skin:     General: Skin is warm and dry.      Capillary Refill: Capillary refill takes less than 2 seconds.   Neurological:      General: No focal deficit present.      Mental Status: He is alert and oriented to person, place, and time. Mental status is at baseline.   Psychiatric:         Mood and Affect: Mood normal.         Thought Content: Thought content normal.              CRANIAL NERVES     CN III, IV, VI   Pupils are equal, round, and reactive to light.       Significant Labs: All pertinent labs within the past 24 hours have been reviewed.    Significant Imaging:  I have reviewed all pertinent imaging results/findings within the past 24 hours.  Assessment/Plan:     * TIA (transient ischemic attack)      Presentation concerning for TIA/minor stroke.  Out of window for thrombolytics, and also with NIHSS of 0     - MRI brain w/o contrast to evaluate for presence of and characterization of infarct  - Load with clopidogrel 300 mg, followed by ASA 81 and clopidogrel 75 mg daily.  - Lipitor 80 mg daily  - Transothoracic echocardiogram  - Lipid profile and A1c for evaluation of modifiable risk factors  - PT/OT/SLP eval and Rx  - Permissive HTN < 220/120 mmHg x 48-72h    Obesity    Body mass index is 35.44 kg/m². Morbid obesity complicates all aspects of disease management from diagnostic modalities to treatment. Weight loss encouraged and health benefits explained to patient.         Mild intermittent asthma without complication    Continue present management with albuterol MDI on p.r.n. basis      Benign localized prostatic hyperplasia with lower urinary tract symptoms (LUTS)    Patient's symptoms are adequately controlled on Uroxatral.  Will continue present management        Prediabetes    Patient is on metformin.  Will check hemoglobin A1c.  In the meantime will start patient on low intensity sliding scale insulin to maintain CBGs in the range of 130s to 180s      RAYMUNDO on CPAP    Will continue CPAP q.h.s. at home settings      Other hyperlipidemia    Will start patient on Lipitor 80 mg p.o. q.h.s.      Essential (primary) hypertension    Will hold off on antihypertensives and allow for permissive hypertension for now      VTE Risk Mitigation (From admission, onward)      Lovenox 40 mg subQ Q 24                            Min KARTHIKEYAN Serra MD  Department of Hospital Medicine  [facility] - Emergency Department

## 2025-03-04 NOTE — ASSESSMENT & PLAN NOTE
Patient's symptoms are adequately controlled on Uroxatral.  Will continue present management

## 2025-03-04 NOTE — ED NOTES
Bed: CCU 01  Expected date: 3/3/25  Expected time: 10:04 PM  Means of arrival:   Comments:  Major 1

## 2025-03-04 NOTE — SUBJECTIVE & OBJECTIVE
Past Medical History:   Diagnosis Date    Anxiety disorder, unspecified     Chronic bilateral low back pain with left-sided sciatica 04/12/2022    Chronic rhinitis 04/12/2022    Depressive disorder     Diabetic eye exam 09/20/2023    Dr. [facility]    Essential (primary) hypertension     GERD (gastroesophageal reflux disease)     History of colon polyps 04/12/2022    Hyperlipidemia     Laceration of left wrist 11/21/2023    RAYMUNDO on CPAP     Prediabetes 04/19/2023    Lab Results Component Value Date  HGBA1C 6.3 04/19/2023      RLS (restless legs syndrome)     TIA (transient ischemic attack)        Past Surgical History:   Procedure Laterality Date    BACK SURGERY      Caudal HARPER  02/18/2013    Dr Rdz    FOOT SURGERY Left     Left l4-L5 TFESI Left 06/20/2012    Dr Rdz    MOLE REMOVAL      NERVE REPAIR Left 11/21/2023    Procedure: REPAIR, SUPERFICIAL BRANCH OF RADIAL   NERVE;  Surgeon: Val Clifton MD;  Location: [facility];  Service: General;  Laterality: Left;    REPAIR OF TENDON OF UPPER EXTREMITY Left 11/21/2023    Procedure: REPAIR BRACHIORADIALIS TENDON;  Surgeon: Val Clifton MD;  Location: [facility] OR;  Service: General;  Laterality: Left;    REPAIR, ARTERY, RADIAL Left 11/21/2023    Procedure: REPAIR/LIGATION, ARTERY, RADIAL;  Surgeon: Val Clifton MD;  Location: [facility];  Service: General;  Laterality: Left;    SHOULDER ARTHROSCOPY Bilateral     T3-T4 HARPER  02/27/2012    Dr Rdz    TONSILLECTOMY      UVULECTOMY         Review of patient's allergies indicates:  No Known Allergies    No current facility-administered medications on file prior to encounter.     Current Outpatient Medications on File Prior to Encounter   Medication Sig    alfuzosin (UROXATRAL) 10 mg Tb24 Take 1 tablet (10 mg total) by mouth daily with breakfast.    cyclobenzaprine (FLEXERIL) 10 MG tablet Take 1 tablet (10 mg total) by mouth nightly as needed for Muscle spasms.    diclofenac sodium  (VOLTAREN) 1 % Gel Apply to back four times daily as needed for pain    DULoxetine (CYMBALTA) 60 MG capsule Take 1 capsule (60 mg total) by mouth 2 (two) times daily.    gabapentin (NEURONTIN) 600 MG tablet Take 2 tablets (1,200 mg total) by mouth 2 (two) times daily.    HYDROcodone-acetaminophen (NORCO)  mg per tablet Take 1 tablet by mouth 3 (three) times daily as needed for Pain.    lisinopriL (PRINIVIL,ZESTRIL) 40 MG tablet Take 1 tablet (40 mg total) by mouth once daily.    metFORMIN (GLUCOPHAGE-XR) 500 MG ER 24hr tablet Take 2 tablets (1,000 mg total) by mouth Daily. (Patient taking differently: Take 500 mg by mouth 2 (two) times a day.)    pramipexole (MIRAPEX) 0.25 MG tablet Take 1 tablet (0.25 mg total) by mouth every evening.    rosuvastatin (CRESTOR) 20 MG tablet Take 1 tablet (20 mg total) by mouth once daily.    traZODone (DESYREL) 50 MG tablet Take 4 tablets (200 mg total) by mouth every evening.    albuterol (VENTOLIN HFA) 90 mcg/actuation inhaler Inhale 2 puffs into the lungs every 4 (four) hours as needed for Wheezing or Shortness of Breath. Rescue    aspirin (ECOTRIN) 325 MG EC tablet Take 325 mg by mouth once daily. Takes on Monday, Wednesday, and Friday    cetirizine (ZYRTEC) 10 MG tablet Take 1 tablet (10 mg total) by mouth once daily.    multivitamin (THERAGRAN) per tablet Take 1 tablet by mouth once daily.    omega-3 fatty acids/fish oil (FISH OIL-OMEGA-3 FATTY ACIDS) 300-1,000 mg capsule Take 1 capsule by mouth once daily.    [DISCONTINUED] NARCAN 4 mg/actuation Spry 1 spray by Nasal route daily as needed.    [DISCONTINUED] testosterone (ANDROGEL) 20.25 mg/1.25 gram (1.62 %) GlPm Place 2 Pump  onto the skin Daily. Apply topically to shoulders or upper arms, not to genitals or abdomen, each morning (Patient not taking: Reported on 2/25/2025)     Family History       Problem Relation (Age of Onset)    Arthritis Sister    Cancer Brother, Brother    Heart attack Father    Heart disease  Mother, Father    Hyperlipidemia Son    Hypertension Son    No Known Problems Sister, Maternal Grandmother, Maternal Grandfather, Paternal Grandmother, Paternal Grandfather    Rheumatic fever Mother          Tobacco Use    Smoking status: Former     Current packs/day: 0.00     Average packs/day: 1 pack/day for 10.0 years (10.0 ttl pk-yrs)     Types: Cigarettes     Start date:      Quit date:      Years since quittin.2     Passive exposure: Never    Smokeless tobacco: Never    Tobacco comments:     Use to be a smoker, quit in . Stated he smoked for about 10 years   Substance and Sexual Activity    Alcohol use: Yes     Alcohol/week: 1.0 standard drink of alcohol     Types: 1 Cans of beer per week     Comment: Occasionally    Drug use: Never    Sexual activity: Yes     Partners: Female     Review of Systems   Constitutional:  Negative for chills, diaphoresis, fatigue and fever.   HENT:  Negative for congestion, hearing loss, nosebleeds, postnasal drip, sore throat, tinnitus and trouble swallowing.    Eyes:  Positive for visual disturbance. Negative for photophobia, pain, discharge and itching.   Respiratory:  Negative for cough, shortness of breath, wheezing and stridor.    Cardiovascular:  Negative for chest pain, palpitations and leg swelling.   Gastrointestinal:  Negative for abdominal distention, abdominal pain, anal bleeding, blood in stool, constipation, diarrhea, nausea and vomiting.   Endocrine: Negative for cold intolerance, heat intolerance, polydipsia, polyphagia and polyuria.   Genitourinary:  Negative for decreased urine volume, difficulty urinating, dysuria, flank pain, frequency, hematuria and urgency.   Musculoskeletal:  Negative for arthralgias, back pain, gait problem, joint swelling, myalgias, neck pain and neck stiffness.   Skin:  Negative for color change, pallor and rash.   Allergic/Immunologic: Negative for immunocompromised state.   Neurological:  Positive for dizziness.  Negative for tremors, seizures, syncope, facial asymmetry, speech difficulty, light-headedness, numbness and headaches.        Patient endorsed right upper extremity numbness and weakness in bilateral lower extremity weakness R >>L   Hematological:  Negative for adenopathy. Does not bruise/bleed easily.     Objective:     Vital Signs (Most Recent):  Temp: 98.2 °F (36.8 °C) (03/03/25 1538)  Pulse: 71 (03/03/25 1840)  Resp: 15 (03/03/25 1840)  BP: (!) 115/59 (03/03/25 1840)  SpO2: (!) 94 % (03/03/25 1840) Vital Signs (24h Range):  Temp:  [98.2 °F (36.8 °C)] 98.2 °F (36.8 °C)  Pulse:  [71-76] 71  Resp:  [14-20] 15  SpO2:  [93 %-96 %] 94 %  BP: (113-132)/(59-75) 115/59     Weight: 108.9 kg (240 lb)  Body mass index is 35.44 kg/m².     Physical Exam  Vitals reviewed.   Constitutional:       General: He is not in acute distress.     Appearance: Normal appearance.   HENT:      Head: Normocephalic and atraumatic.      Right Ear: External ear normal.      Left Ear: External ear normal.   Eyes:      Extraocular Movements: Extraocular movements intact.      Pupils: Pupils are equal, round, and reactive to light.   Cardiovascular:      Rate and Rhythm: Normal rate and regular rhythm.      Pulses: Normal pulses.      Heart sounds: Normal heart sounds. No murmur heard.  Pulmonary:      Effort: Pulmonary effort is normal. No respiratory distress.      Breath sounds: Normal breath sounds. No wheezing.   Chest:      Chest wall: No tenderness.   Abdominal:      General: Abdomen is flat.      Palpations: Abdomen is soft. There is no mass.      Tenderness: There is no abdominal tenderness. There is no right CVA tenderness or left CVA tenderness.   Musculoskeletal:         General: No swelling or tenderness. Normal range of motion.   Skin:     General: Skin is warm and dry.      Capillary Refill: Capillary refill takes less than 2 seconds.   Neurological:      General: No focal deficit present.      Mental Status: He is alert and  oriented to person, place, and time. Mental status is at baseline.   Psychiatric:         Mood and Affect: Mood normal.         Thought Content: Thought content normal.              CRANIAL NERVES     CN III, IV, VI   Pupils are equal, round, and reactive to light.       Significant Labs: All pertinent labs within the past 24 hours have been reviewed.    Significant Imaging: I have reviewed all pertinent imaging results/findings within the past 24 hours.

## 2025-03-04 NOTE — ASSESSMENT & PLAN NOTE
>>ASSESSMENT AND PLAN FOR OBESITY WRITTEN ON 3/4/2025  2:46 PM BY CARLOS PRO MD      Body mass index is 35.44 kg/m². Morbid obesity complicates all aspects of disease management from diagnostic modalities to treatment. Weight loss encouraged and health benefits explained to patient.

## 2025-03-05 ENCOUNTER — TELEPHONE (OUTPATIENT)
Dept: FAMILY MEDICINE | Facility: CLINIC | Age: 75
End: 2025-03-05
Payer: COMMERCIAL

## 2025-03-05 ENCOUNTER — PATIENT OUTREACH (OUTPATIENT)
Dept: ADMINISTRATIVE | Facility: CLINIC | Age: 75
End: 2025-03-05
Payer: COMMERCIAL

## 2025-03-05 DIAGNOSIS — I10 ESSENTIAL (PRIMARY) HYPERTENSION: Chronic | ICD-10-CM

## 2025-03-05 DIAGNOSIS — I25.10 ATHEROSCLEROTIC CARDIOVASCULAR DISEASE: Chronic | ICD-10-CM

## 2025-03-05 DIAGNOSIS — R73.03 PREDIABETES: Chronic | ICD-10-CM

## 2025-03-05 DIAGNOSIS — E66.01 SEVERE OBESITY (BMI 35.0-35.9 WITH COMORBIDITY): Chronic | ICD-10-CM

## 2025-03-05 DIAGNOSIS — G45.9 TIA (TRANSIENT ISCHEMIC ATTACK): Primary | ICD-10-CM

## 2025-03-05 RX ORDER — CLOPIDOGREL BISULFATE 75 MG/1
75 TABLET ORAL DAILY
Qty: 21 TABLET | Refills: 0 | Status: SHIPPED | OUTPATIENT
Start: 2025-03-05 | End: 2025-03-05

## 2025-03-05 RX ORDER — SEMAGLUTIDE 0.68 MG/ML
0.25 INJECTION, SOLUTION SUBCUTANEOUS
Qty: 1.5 ML | Refills: 0 | Status: CANCELLED | OUTPATIENT
Start: 2025-03-05 | End: 2025-04-04

## 2025-03-05 RX ORDER — SEMAGLUTIDE 0.68 MG/ML
0.5 INJECTION, SOLUTION SUBCUTANEOUS
Qty: 3 ML | Refills: 0 | Status: SHIPPED | OUTPATIENT
Start: 2025-04-04 | End: 2025-03-05

## 2025-03-05 RX ORDER — CLOPIDOGREL BISULFATE 75 MG/1
75 TABLET ORAL DAILY
Qty: 21 TABLET | Refills: 0 | Status: SHIPPED | OUTPATIENT
Start: 2025-03-05 | End: 2026-03-05

## 2025-03-05 RX ORDER — LISINOPRIL 40 MG/1
40 TABLET ORAL DAILY
Qty: 90 TABLET | Refills: 3 | Status: SHIPPED | OUTPATIENT
Start: 2025-03-05

## 2025-03-05 RX ORDER — SEMAGLUTIDE 0.68 MG/ML
0.5 INJECTION, SOLUTION SUBCUTANEOUS
Qty: 3 ML | Refills: 0 | Status: CANCELLED | OUTPATIENT
Start: 2025-04-04 | End: 2025-05-04

## 2025-03-05 RX ORDER — ASPIRIN 81 MG/1
81 TABLET ORAL DAILY
Qty: 21 TABLET | Refills: 0 | Status: SHIPPED | OUTPATIENT
Start: 2025-03-05 | End: 2025-03-05

## 2025-03-05 RX ORDER — SEMAGLUTIDE 0.68 MG/ML
0.25 INJECTION, SOLUTION SUBCUTANEOUS
Qty: 1.5 ML | Refills: 0 | Status: SHIPPED | OUTPATIENT
Start: 2025-03-05 | End: 2025-03-05

## 2025-03-05 RX ORDER — ASPIRIN 81 MG/1
81 TABLET ORAL DAILY
Qty: 21 TABLET | Refills: 0 | Status: SHIPPED | OUTPATIENT
Start: 2025-03-05 | End: 2025-03-26

## 2025-03-05 RX ORDER — ATORVASTATIN CALCIUM 40 MG/1
40 TABLET, FILM COATED ORAL DAILY
Qty: 90 TABLET | Refills: 0 | Status: SHIPPED | OUTPATIENT
Start: 2025-03-05 | End: 2026-03-05

## 2025-03-05 RX ORDER — SEMAGLUTIDE 0.68 MG/ML
0.5 INJECTION, SOLUTION SUBCUTANEOUS
Qty: 9 ML | Refills: 3 | Status: SHIPPED | OUTPATIENT
Start: 2025-03-05 | End: 2026-03-05

## 2025-03-05 NOTE — TELEPHONE ENCOUNTER
[facility]  Want to let you know this will likely require a PA it was added by the hospitalist on discharge 03/04/2025 for TIA and with his comorbidities and BMI should be covered but you have to put all the comorbidities on the PA.

## 2025-03-05 NOTE — PROGRESS NOTES
C3 nurse spoke with Chano Clement  for a [facility] post hospital discharge follow up call. The patient has a scheduled HOSFU appointment with Calista Wright FNP  on 3/11/25 @ 1000.

## 2025-03-05 NOTE — TELEPHONE ENCOUNTER
----- Message from Liz sent at 3/5/2025  1:27 PM CST -----   Pt want to know carl can send in the meds the ED  sent for him Because ED sent it to [facility] and want it sent to [facility] Pt # [phone]  
[facility]  
Patient

## 2025-03-05 NOTE — ASSESSMENT & PLAN NOTE
Lab Results   Component Value Date    HGBA1C 5.9 03/03/2025    HGBA1C 5.9 11/21/2024    HGBA1C 6.3 05/20/2024     Stable, last A1c improved with lifestyle changes and weight loss.  Hospitalist added Ozempic due to TIA and other co-morbidities at [facility] 3/04/25

## 2025-03-05 NOTE — TELEPHONE ENCOUNTER
----- Message from Liz sent at 3/5/2025  3:22 PM CST -----   Now he is need [facility]

## 2025-03-06 LAB
OHS QRS DURATION: 106 MS
OHS QTC CALCULATION: 427 MS

## 2025-03-11 ENCOUNTER — OFFICE VISIT (OUTPATIENT)
Dept: FAMILY MEDICINE | Facility: CLINIC | Age: 75
End: 2025-03-11
Payer: COMMERCIAL

## 2025-03-11 VITALS
TEMPERATURE: 99 F | BODY MASS INDEX: 36.29 KG/M2 | DIASTOLIC BLOOD PRESSURE: 70 MMHG | WEIGHT: 245 LBS | RESPIRATION RATE: 20 BRPM | HEART RATE: 72 BPM | OXYGEN SATURATION: 97 % | SYSTOLIC BLOOD PRESSURE: 131 MMHG | HEIGHT: 69 IN

## 2025-03-11 DIAGNOSIS — G45.9 TIA (TRANSIENT ISCHEMIC ATTACK): Primary | ICD-10-CM

## 2025-03-11 DIAGNOSIS — E66.01 SEVERE OBESITY (BMI 35.0-35.9 WITH COMORBIDITY): Chronic | ICD-10-CM

## 2025-03-11 DIAGNOSIS — R73.03 PREDIABETES: Chronic | ICD-10-CM

## 2025-03-11 PROCEDURE — 3044F HG A1C LEVEL LT 7.0%: CPT | Mod: ,,, | Performed by: NURSE PRACTITIONER

## 2025-03-11 PROCEDURE — 1101F PT FALLS ASSESS-DOCD LE1/YR: CPT | Mod: ,,, | Performed by: NURSE PRACTITIONER

## 2025-03-11 PROCEDURE — 3075F SYST BP GE 130 - 139MM HG: CPT | Mod: ,,, | Performed by: NURSE PRACTITIONER

## 2025-03-11 PROCEDURE — 3288F FALL RISK ASSESSMENT DOCD: CPT | Mod: ,,, | Performed by: NURSE PRACTITIONER

## 2025-03-11 PROCEDURE — 99496 TRANSJ CARE MGMT HIGH F2F 7D: CPT | Mod: ,,, | Performed by: NURSE PRACTITIONER

## 2025-03-11 PROCEDURE — 1160F RVW MEDS BY RX/DR IN RCRD: CPT | Mod: ,,, | Performed by: NURSE PRACTITIONER

## 2025-03-11 PROCEDURE — 3078F DIAST BP <80 MM HG: CPT | Mod: ,,, | Performed by: NURSE PRACTITIONER

## 2025-03-11 PROCEDURE — 1159F MED LIST DOCD IN RCRD: CPT | Mod: ,,, | Performed by: NURSE PRACTITIONER

## 2025-03-11 PROCEDURE — 1126F AMNT PAIN NOTED NONE PRSNT: CPT | Mod: ,,, | Performed by: NURSE PRACTITIONER

## 2025-03-11 RX ORDER — SEMAGLUTIDE 0.25 MG/.5ML
0.25 INJECTION, SOLUTION SUBCUTANEOUS
Qty: 2 ML | Refills: 0 | Status: SHIPPED | OUTPATIENT
Start: 2025-03-11 | End: 2025-04-08

## 2025-03-11 RX ORDER — SEMAGLUTIDE 0.25 MG/.5ML
0.25 INJECTION, SOLUTION SUBCUTANEOUS
Qty: 2 ML | Refills: 0 | Status: SHIPPED | OUTPATIENT
Start: 2025-03-11 | End: 2025-03-11

## 2025-03-11 NOTE — ASSESSMENT & PLAN NOTE
"Height and Weight  Height: 5' 9" (175.3 cm)  Weight: 111.1 kg (245 lb)  BSA (Calculated - sq m): 2.33 sq meters  BMI (Calculated): 36.2  Weight in (lb) to have BMI = 25: 168.9    Patient has experienced failure to lose weight on lifestyle therapy alone.  In accordance with ACE/AACE guidelines and FDA indication, recommend adding medication management with Wegovy/semaglutide in conjunction with a healthy, reduced calorie diet and increased physical activity to improve overall health and reduce the risk of major adverse cardiovascular event.  Pt is at higher risk for MACE due to known history of TIA x 2, HTN, HLD, BMI > 35, prediabetes with most recent A1c 5.9% 3/03/25.    Treatment goal:  To lose 5% of body weight or 12 lbs in the first 16-24 wks of treatment and a long term weight loss goal of 10-15% or more body weight to improve weight related co-morbidities and overall health and well-being.    Discussed medication/treatment options, risks versus benefits of medication(s), and monitoring requirements with patient.  Stressed the importance of decreasing food portion sizes and staying well hydrated with water throughout each day to prevent increased risk for nausea.  Advised will initially need monthly weight management visit for monitoring until reaches a maintenance dose and assured tolerance of med then will increase to every 3 month follow-up.  Verbalized understanding and would like to proceed if med is covered.    "

## 2025-03-11 NOTE — ASSESSMENT & PLAN NOTE
Lab Results   Component Value Date    HGBA1C 5.9 03/03/2025    HGBA1C 5.9 11/21/2024    HGBA1C 6.3 05/20/2024     Stable, last A1c improved with lifestyle changes and weight loss.  Hospitalist added Ozempic due to TIA and other co-morbidities at \[facility]\"" 3/04/25

## 2025-03-11 NOTE — PROGRESS NOTES
[facility] FAMILY MEDICINE       PATIENT NAME: Chano Clement   : 1950    AGE: 74 y.o. DATE OF ENCOUNTER: 3/11/25    MRN: [id number]      PCP: Calista Wright FNP    Reason for Visit / Chief Complaint:  Transitional Care (Patient presents to clinic for hospital discharge for TIA)         274}    Subjective:     HPI:    Chano Clement presents for a Transitional Care Management hospital discharge follow-up visit.   History of Present Illness    HPI:  Patient was admitted to [facility] on 3/3/25 for a TIA and discharged on 3/4/25. During this recent TIA episode, he had blurry vision on the right side, inability to focus with the right eye, floaters, and an unusual sensation with reduced strength in the right hand. These symptoms resolved within 24 hours, consistent with the definition of a TIA.  Rx for Ozempic was given to start upon hospital discharge but it has not been initiated due to problems with the PA likely due to A1c < 6.5%.    He reports a history of a previous TIA that occurred years ago while hunting. During that episode, his speech became garbled, and one hand (side not recalled) became weak. These symptoms also resolved while at a [employer] dispensary.    He reports current issues with leg weakness, more pronounced on the right side. He describes difficulty climbing stairs, needing to lead with the left foot and then bringing the right foot up to each step. He expresses concern about muscle atrophy due to age and a desire for interventions to increase energy and ability to exercise.    He has been diagnosed with prediabetes. An A1C of 6.3 was recorded in 2023 and again in May 2024. More recent A1C results have shown improvement to 5.9, with the last recorded value being 5.9 on 3/3/25. He has been prescribed metformin for blood sugar management, though there appears to be confusion about the dosage, with him taking 1 tablet daily instead of the  prescribed 2 tablets.    He denies any lasting symptoms from the recent TIA or having had a full stroke in the past.      ROS:  Eyes: +vision changes  Musculoskeletal: +muscle weakness           Family and/or Caretaker present at visit?  No.  Diagnostic tests reviewed/disposition: No diagnosic tests pending after this hospitalization.  Home health/community services discussion/referrals: Patient does not have home health established from hospital visit.  They do not need home health.  If needed, we will set up home health for the patient.   Establishment or re-establishment of referral orders for community resources: No other necessary community resources.   Discussion with other health care providers: No discussion with other health care providers necessary.     Discharge and current medications have been reconciled.      Allergies and Meds: 274}   Review of patient's allergies indicates:  No Known Allergies     Current Outpatient Medications:     albuterol (VENTOLIN HFA) 90 mcg/actuation inhaler, Inhale 2 puffs into the lungs every 4 (four) hours as needed for Wheezing or Shortness of Breath. Rescue, Disp: 18 g, Rfl: 1    alfuzosin (UROXATRAL) 10 mg Tb24, Take 1 tablet (10 mg total) by mouth daily with breakfast., Disp: 90 tablet, Rfl: 3    aspirin (ECOTRIN) 81 MG EC tablet, Take 1 tablet (81 mg total) by mouth once daily. for 21 days, Disp: 21 tablet, Rfl: 0    atorvastatin (LIPITOR) 40 MG tablet, Take 1 tablet (40 mg total) by mouth once daily., Disp: 90 tablet, Rfl: 0    cetirizine (ZYRTEC) 10 MG tablet, Take 1 tablet (10 mg total) by mouth once daily., Disp: 90 tablet, Rfl: 3    clopidogreL (PLAVIX) 75 mg tablet, Take 1 tablet (75 mg total) by mouth once daily., Disp: 21 tablet, Rfl: 0    cyclobenzaprine (FLEXERIL) 10 MG tablet, Take 1 tablet (10 mg total) by mouth nightly as needed for Muscle spasms., Disp: 90 tablet, Rfl: 1    diclofenac sodium (VOLTAREN) 1 % Gel, Apply to back four times daily as needed for  pain, Disp: 900 g, Rfl: 11    DULoxetine (CYMBALTA) 60 MG capsule, Take 1 capsule (60 mg total) by mouth 2 (two) times daily., Disp: 180 capsule, Rfl: 3    gabapentin (NEURONTIN) 600 MG tablet, Take 2 tablets (1,200 mg total) by mouth 2 (two) times daily., Disp: 360 tablet, Rfl: 1    HYDROcodone-acetaminophen (NORCO)  mg per tablet, Take 1 tablet by mouth 3 (three) times daily as needed for Pain., Disp: 90 tablet, Rfl: 0    lisinopriL (PRINIVIL,ZESTRIL) 40 MG tablet, Take 1 tablet (40 mg total) by mouth once daily., Disp: 90 tablet, Rfl: 3    metFORMIN (GLUCOPHAGE-XR) 500 MG ER 24hr tablet, Take 2 tablets (1,000 mg total) by mouth Daily. (Patient taking differently: Take 500 mg by mouth Daily.), Disp: 180 tablet, Rfl: 3    multivitamin (THERAGRAN) per tablet, Take 1 tablet by mouth once daily., Disp: , Rfl:     omega-3 fatty acids/fish oil (FISH OIL-OMEGA-3 FATTY ACIDS) 300-1,000 mg capsule, Take 1 capsule by mouth once daily., Disp: , Rfl:     pramipexole (MIRAPEX) 0.25 MG tablet, Take 1 tablet (0.25 mg total) by mouth every evening., Disp: 90 tablet, Rfl: 3    traZODone (DESYREL) 50 MG tablet, Take 4 tablets (200 mg total) by mouth every evening., Disp: 360 tablet, Rfl: 3    semaglutide (OZEMPIC) 0.25 mg or 0.5 mg (2 mg/3 mL) pen injector, Inject 0.5 mg into the skin every 7 days. Start with 0.25 mg weekly x 4 weeks then 0.5 mg weekly (Patient not taking: Reported on 3/11/2025), Disp: 9 mL, Rfl: 3    semaglutide, weight loss, (WEGOVY) 0.25 mg/0.5 mL PnIj, Inject 0.25 mg into the skin every 7 days. Dose will escalate every 4 weeks until maintenance of 2.4 mg weekly is reached., Disp: 2 mL, Rfl: 0    Labs:274}    I have reviewed old labs below:  Lab Results   Component Value Date    WBC 8.26 03/04/2025    RBC 4.42 (L) 03/04/2025    HGB 13.1 (L) 03/04/2025    HCT 39.9 (L) 03/04/2025     03/04/2025     03/04/2025    K 3.9 03/04/2025     03/04/2025    CALCIUM 9.0 03/04/2025      03/04/2025    BUN 18 03/04/2025    CREATININE 1.15 03/04/2025    EGFRNONAA 80 04/12/2022    ALT 11 03/04/2025    AST 18 03/04/2025    INR 1.04 03/04/2025    CHOL 103 03/03/2025    TRIG 44 03/03/2025    HDL 48 03/03/2025    LDLCALC 46 03/03/2025    TSH 0.879 03/03/2025    PSA 0.762 02/25/2025    HGBA1C 5.9 03/03/2025    MICROALBUR 0.5 11/14/2023       Medical History: 274}     Past Medical History:   Diagnosis Date    Anxiety disorder, unspecified     Chronic bilateral low back pain with left-sided sciatica 04/12/2022    Chronic rhinitis 04/12/2022    Depressive disorder     Diabetic eye exam 09/20/2023    Dr. [facility]    Essential (primary) hypertension     GERD (gastroesophageal reflux disease)     History of colon polyps 04/12/2022    Hyperlipidemia     Laceration of left wrist 11/21/2023    RAYMUNDO on CPAP     Prediabetes 04/19/2023    Lab Results Component Value Date  HGBA1C 6.3 04/19/2023      RLS (restless legs syndrome)     TIA (transient ischemic attack)       Tobacco Use History[1]   Past Surgical History:   Procedure Laterality Date    BACK SURGERY      Caudal HARPER  02/18/2013    Dr Rdz    FOOT SURGERY Left     Left l4-L5 TFESI Left 06/20/2012    Dr Rdz    MOLE REMOVAL      NERVE REPAIR Left 11/21/2023    Procedure: REPAIR, SUPERFICIAL BRANCH OF RADIAL   NERVE;  Surgeon: Val Clifton MD;  Location: [facility] OR;  Service: General;  Laterality: Left;    REPAIR OF TENDON OF UPPER EXTREMITY Left 11/21/2023    Procedure: REPAIR BRACHIORADIALIS TENDON;  Surgeon: Val Clifton MD;  Location: [facility] OR;  Service: General;  Laterality: Left;    REPAIR, ARTERY, RADIAL Left 11/21/2023    Procedure: REPAIR/LIGATION, ARTERY, RADIAL;  Surgeon: Val Clifton MD;  Location: [facility] OR;  Service: General;  Laterality: Left;    SHOULDER ARTHROSCOPY Bilateral     T3-T4 HARPER  02/27/2012    Dr Rdz    TONSILLECTOMY      UVULECTOMY          Health Maintenance: 274}     Health  "Maintenance         Date Due Completion Date    Foot Exam Never done ---    Shingles Vaccine (1 of 2) Never done ---    COVID-19 Vaccine (3 - 2024-25 season) 09/01/2024 3/1/2021    Diabetic Eye Exam 09/20/2024 9/20/2023    Diabetes Urine Screening 11/14/2024 11/14/2023    RSV Vaccine (Age 60+ and Pregnant patients) (1 - 1-dose 75+ series) Never done ---    Hemoglobin A1c 09/03/2025 3/3/2025    PROSTATE-SPECIFIC ANTIGEN 02/25/2026 2/25/2025    Lipid Panel 03/03/2026 3/3/2025    High Dose Statin 03/11/2026 3/11/2025    Colorectal Cancer Screening 05/10/2026 5/10/2021    TETANUS VACCINE 11/21/2033 11/21/2023            Objective:  274}   Vital Signs  Temp: 98.8 °F (37.1 °C)  Temp Source: Oral  Pulse: 72  Resp: 20  SpO2: 97 %  BP: 131/70  BP Location: Left arm  Patient Position: Sitting  Pain Score: 0-No pain  Height and Weight  Height: 5' 9" (175.3 cm)  Weight: 111.1 kg (245 lb)  BSA (Calculated - sq m): 2.33 sq meters  BMI (Calculated): 36.2  Weight in (lb) to have BMI = 25: 168.9    Over the last two weeks how often have you been bothered by little interest or pleasure in doing things: 0  Over the last two weeks how often have you been bothered by feeling down, depressed or hopeless: 0  PHQ-2 Total Score: 0  PHQ-9 Score: 0  PHQ-9 Interpretation: Minimal or None    Wt Readings from Last 3 Encounters:   03/11/25 111.1 kg (245 lb)   03/03/25 108.9 kg (240 lb)   02/25/25 109.3 kg (241 lb)       Physical Exam  Vitals and nursing note reviewed.   Constitutional:       General: He is not in acute distress.     Appearance: Normal appearance. He is obese. He is not ill-appearing.   HENT:      Head: Normocephalic.   Eyes:      Conjunctiva/sclera: Conjunctivae normal.   Cardiovascular:      Rate and Rhythm: Normal rate and regular rhythm.      Heart sounds: Normal heart sounds.   Pulmonary:      Effort: Pulmonary effort is normal. No respiratory distress.      Breath sounds: Normal breath sounds. No wheezing, rhonchi or rales. " "  Musculoskeletal:      Right lower leg: No edema.      Left lower leg: No edema.   Skin:     General: Skin is warm and dry.      Coloration: Skin is not jaundiced or pale.   Neurological:      Mental Status: He is alert and oriented to person, place, and time.      Gait: Gait normal.   Psychiatric:         Mood and Affect: Mood normal.         Behavior: Behavior normal.         Thought Content: Thought content normal.         Judgment: Judgment normal.          Assessment and Plan: 274}     1. TIA (transient ischemic attack)  -     Discontinue: semaglutide, weight loss, (WEGOVY) 0.25 mg/0.5 mL PnIj; Inject 0.25 mg into the skin every 7 days. Dose will escalate every 4 weeks until maintenance of 2.4 mg weekly is reached.  Dispense: 2 mL; Refill: 0  -     semaglutide, weight loss, (WEGOVY) 0.25 mg/0.5 mL PnIj; Inject 0.25 mg into the skin every 7 days. Dose will escalate every 4 weeks until maintenance of 2.4 mg weekly is reached.  Dispense: 2 mL; Refill: 0    2. Severe obesity (BMI 35.0-35.9 with comorbidity)  Assessment & Plan:  Height and Weight  Height: 5' 9" (175.3 cm)  Weight: 111.1 kg (245 lb)  BSA (Calculated - sq m): 2.33 sq meters  BMI (Calculated): 36.2  Weight in (lb) to have BMI = 25: 168.9    Patient has experienced failure to lose weight on lifestyle therapy alone.  In accordance with ACE/AACE guidelines and FDA indication, recommend adding medication management with Wegovy/semaglutide in conjunction with a healthy, reduced calorie diet and increased physical activity to improve overall health and reduce the risk of major adverse cardiovascular event.  Pt is at higher risk for MACE due to known history of TIA x 2, HTN, HLD, BMI > 35, prediabetes with most recent A1c 5.9% 3/03/25.    Treatment goal:  To lose 5% of body weight or 12 lbs in the first 16-24 wks of treatment and a long term weight loss goal of 10-15% or more body weight to improve weight related co-morbidities and overall health and " well-being.    Discussed medication/treatment options, risks versus benefits of medication(s), and monitoring requirements with patient.  Stressed the importance of decreasing food portion sizes and staying well hydrated with water throughout each day to prevent increased risk for nausea.  Advised will initially need monthly weight management visit for monitoring until reaches a maintenance dose and assured tolerance of med then will increase to every 3 month follow-up.  Verbalized understanding and would like to proceed if med is covered.      Orders:  -     Discontinue: semaglutide, weight loss, (WEGOVY) 0.25 mg/0.5 mL PnIj; Inject 0.25 mg into the skin every 7 days. Dose will escalate every 4 weeks until maintenance of 2.4 mg weekly is reached.  Dispense: 2 mL; Refill: 0  -     semaglutide, weight loss, (WEGOVY) 0.25 mg/0.5 mL PnIj; Inject 0.25 mg into the skin every 7 days. Dose will escalate every 4 weeks until maintenance of 2.4 mg weekly is reached.  Dispense: 2 mL; Refill: 0    3. Prediabetes  Overview:  Dx w/ prediabetes April 2023 with A1c 6.3%.      Assessment & Plan:  Lab Results   Component Value Date    HGBA1C 5.9 03/03/2025    HGBA1C 5.9 11/21/2024    HGBA1C 6.3 05/20/2024     Stable, last A1c improved with lifestyle changes and weight loss.  Hospitalist added Ozempic due to TIA and other co-morbidities at [facility] 3/04/25    Orders:  -     Discontinue: semaglutide, weight loss, (WEGOVY) 0.25 mg/0.5 mL PnIj; Inject 0.25 mg into the skin every 7 days. Dose will escalate every 4 weeks until maintenance of 2.4 mg weekly is reached.  Dispense: 2 mL; Refill: 0  -     semaglutide, weight loss, (WEGOVY) 0.25 mg/0.5 mL PnIj; Inject 0.25 mg into the skin every 7 days. Dose will escalate every 4 weeks until maintenance of 2.4 mg weekly is reached.  Dispense: 2 mL; Refill: 0      Assessment & Plan    IMPRESSION:  - Considering Wegovy instead of Ozempic due to insurance coverage issues and patient's history of  TIAs  - Wegovy (semaglutide) molecularly identical to Ozempic but FDA-approved for obesity management  - Medicare may approve Wegovy for patients with BMI >30 and history of cardiovascular events  - Patient's recent TIA and previous TIA years ago increase risk for major stroke  - Testosterone therapy via topical application preferred over injections to minimize level fluctuations  - Current A1C (5.9%) too low to justify Ozempic for diabetes management  - Metformin dose has been 500mg daily per patient, not 1000mg as previously thought and prescribed    TRANSIENT ISCHEMIC ATTACK (TIA):  - Monitored patient's symptoms of TIA, including blurry vision on the right side, inability to focus with right eye, floaters, and weakness in the right hand.  - Noted patient's history of a previous TIA years ago with symptoms of garbled speech and hand weakness.  - Evaluated that patient's symptoms resolved quickly, confirming the diagnosis of TIA rather than stroke.  - Acknowledged patient's history of two TIAs and increased risk for stroke.  - Noted patient's history of two TIAs, indicating ongoing cerebrovascular issues.  - Acknowledged patient's increased risk for stroke due to history of TIAs and other comorbidities.  - Acknowledged patient's history of TIAs and increased risk for future cerebrovascular events.  - Planned to prescribe Wegovy to reduce the risk of non-fatal stroke and heart attack, considering patient's history of TIAs and other comorbidities.    LEG WEAKNESS:  - Monitored patient's report of weakness in legs, more pronounced on the right side, affecting mobility and stair climbing.  - Acknowledged patient's concern about leg weakness.  - Suggested considering physical therapy for management but patient reports [employer] would not cover his physical therapy of choice.    PREDIABETES:  - Monitored patient's A1C level over time, with recent values of 5.9.  - Evaluated that patient's A1C level are in the prediabetic  range, not high enough for diabetes diagnosis.  - Acknowledged prediabetes diagnosis and the need for management to prevent progression to diabetes.  - Noted current treatment with metformin 500mg daily for prediabetes management.  - Increase metformin dosage to 2 tablets (1000mg) daily as prescribed.    WEIGHT MANAGEMENT:  - Planned to prescribe Wegovy for weight management and potential improvement in blood sugar control.  - Discussed Wegovy's mechanism of action, slowing food movement through GI tract.  - Explained common side effects including nausea and constipation.  - Reviewed potential for sickness from overeating while on Wegovy, especially later in the day.  - Initiated Wegovy (semaglutide) treatment.  - Scheduled follow up in 1 month for Wegovy dosage titration and re-evaluation of response to medication.  - Instructed patient to contact the office if Wegovy is not available at the base pharmacy.  - Acknowledged patient's obesity as a comorbidity contributing to health risks.  - Planned to prescribe Wegovy for long-term management of obesity, with a goal of at least 5% weight loss in the first 3-6 months.  - Patient to reduce food intake and increase physical activity.  - Recommend prioritizing drinking enough water.  - [facility] pharmacy does not carry Wegovy and can not sent to express scripts due to monthly dosage change required so requests [facility] pharmacy on [id number]    TESTOSTERONE THERAPY:  - Explained that testosterone shots can cause peaks and troughs in levels, potentially increasing prostate gland tissue.         all questions were answered to the satisfaction of the patient, and pt verbalized understanding and agreement with treatment plan.      Follow up in about 1 month (around 4/11/2025) for Wegovy/weight management.    Signature:  ARGENTINA West    Future Appointments   Date Time Provider Department Center   3/31/2025 10:00 AM Tutu Ortega DO [facility] [place]  MO[facility]   2025  1:20 PM Calista Wright FNP [facility] D[facility] [place]   2025 10:40 AM Calista Wright FNP [facility] [place]   2026  1:40 PM Esthela Jaramillo MD [facility]  [facility] [place] [facility]   2026  8:00 AM AWV NURSE, [facility] FAMILY MEDICINE [facility] [place]   This note was generated with the assistance of ambient listening technology. Verbal consent was obtained by the patient and accompanying visitor(s) for the recording of patient appointment to facilitate this note. I attest to having reviewed and edited the generated note for accuracy, though some syntax or spelling errors may persist. Please contact the author of this note for any clarification.           [1]   Social History  Tobacco Use   Smoking Status Former    Current packs/day: 0.00    Average packs/day: 1 pack/day for 10.0 years (10.0 ttl pk-yrs)    Types: Cigarettes    Start date:     Quit date:     Years since quittin.2    Passive exposure: Never   Smokeless Tobacco Never   Tobacco Comments    Use to be a smoker, quit in . Stated he smoked for about 10 years

## 2025-03-26 ENCOUNTER — HOSPITAL ENCOUNTER (OUTPATIENT)
Dept: CARDIOLOGY | Facility: HOSPITAL | Age: 75
Discharge: HOME | End: 2025-03-26
Attending: INTERNAL MEDICINE
Payer: COMMERCIAL

## 2025-03-26 ENCOUNTER — OFFICE VISIT (OUTPATIENT)
Dept: CARDIOLOGY | Facility: CLINIC | Age: 75
End: 2025-03-26
Payer: COMMERCIAL

## 2025-03-26 VITALS
DIASTOLIC BLOOD PRESSURE: 68 MMHG | OXYGEN SATURATION: 97 % | BODY MASS INDEX: 35.99 KG/M2 | HEIGHT: 69 IN | SYSTOLIC BLOOD PRESSURE: 132 MMHG | HEART RATE: 72 BPM | WEIGHT: 243 LBS

## 2025-03-26 DIAGNOSIS — Z87.891 FORMER SMOKER: ICD-10-CM

## 2025-03-26 DIAGNOSIS — R07.89 CHEST PRESSURE: ICD-10-CM

## 2025-03-26 DIAGNOSIS — G47.33 OSA ON CPAP: Chronic | ICD-10-CM

## 2025-03-26 DIAGNOSIS — I10 ESSENTIAL (PRIMARY) HYPERTENSION: Chronic | ICD-10-CM

## 2025-03-26 DIAGNOSIS — R07.89 CHEST PRESSURE: Primary | ICD-10-CM

## 2025-03-26 DIAGNOSIS — I25.10 ATHEROSCLEROTIC CARDIOVASCULAR DISEASE: Chronic | ICD-10-CM

## 2025-03-26 DIAGNOSIS — H53.2 DIPLOPIA: ICD-10-CM

## 2025-03-26 DIAGNOSIS — J45.20 MILD INTERMITTENT ASTHMA WITHOUT COMPLICATION: Chronic | ICD-10-CM

## 2025-03-26 PROCEDURE — 1159F MED LIST DOCD IN RCRD: CPT | Mod: CPTII,,, | Performed by: INTERNAL MEDICINE

## 2025-03-26 PROCEDURE — 3044F HG A1C LEVEL LT 7.0%: CPT | Mod: CPTII,,, | Performed by: INTERNAL MEDICINE

## 2025-03-26 PROCEDURE — 3075F SYST BP GE 130 - 139MM HG: CPT | Mod: CPTII,,, | Performed by: INTERNAL MEDICINE

## 2025-03-26 PROCEDURE — 1125F AMNT PAIN NOTED PAIN PRSNT: CPT | Mod: CPTII,,, | Performed by: INTERNAL MEDICINE

## 2025-03-26 PROCEDURE — 99205 OFFICE O/P NEW HI 60 MIN: CPT | Mod: S$PBB,,, | Performed by: INTERNAL MEDICINE

## 2025-03-26 PROCEDURE — 3078F DIAST BP <80 MM HG: CPT | Mod: CPTII,,, | Performed by: INTERNAL MEDICINE

## 2025-03-26 PROCEDURE — 93246 EXT ECG>7D<15D RECORDING: CPT

## 2025-03-26 PROCEDURE — 1160F RVW MEDS BY RX/DR IN RCRD: CPT | Mod: CPTII,,, | Performed by: INTERNAL MEDICINE

## 2025-03-26 PROCEDURE — 99215 OFFICE O/P EST HI 40 MIN: CPT | Mod: PBBFAC,25 | Performed by: INTERNAL MEDICINE

## 2025-03-26 PROCEDURE — 3288F FALL RISK ASSESSMENT DOCD: CPT | Mod: CPTII,,, | Performed by: INTERNAL MEDICINE

## 2025-03-26 PROCEDURE — 99999 PR PBB SHADOW E&M-EST. PATIENT-LVL V: CPT | Mod: PBBFAC,,, | Performed by: INTERNAL MEDICINE

## 2025-03-26 PROCEDURE — 93010 ELECTROCARDIOGRAM REPORT: CPT | Mod: S$PBB,,, | Performed by: INTERNAL MEDICINE

## 2025-03-26 PROCEDURE — 93005 ELECTROCARDIOGRAM TRACING: CPT | Mod: PBBFAC | Performed by: INTERNAL MEDICINE

## 2025-03-26 PROCEDURE — 1100F PTFALLS ASSESS-DOCD GE2>/YR: CPT | Mod: CPTII,,, | Performed by: INTERNAL MEDICINE
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
## 2025-03-27 LAB
OHS QRS DURATION: 88 MS
OHS QTC CALCULATION: 435 MS

## 2025-03-28 ENCOUNTER — PATIENT MESSAGE (OUTPATIENT)
Dept: CARDIOLOGY | Facility: CLINIC | Age: 75
End: 2025-03-28
Payer: COMMERCIAL

## 2025-04-04 ENCOUNTER — TELEPHONE (OUTPATIENT)
Dept: CARDIOLOGY | Facility: HOSPITAL | Age: 75
End: 2025-04-04
Payer: COMMERCIAL

## 2025-04-07 ENCOUNTER — HOSPITAL ENCOUNTER (OUTPATIENT)
Dept: CARDIOLOGY | Facility: HOSPITAL | Age: 75
Discharge: HOME | End: 2025-04-07
Attending: INTERNAL MEDICINE
Payer: COMMERCIAL

## 2025-04-07 VITALS — DIASTOLIC BLOOD PRESSURE: 66 MMHG | HEART RATE: 69 BPM | SYSTOLIC BLOOD PRESSURE: 132 MMHG

## 2025-04-07 DIAGNOSIS — I10 ESSENTIAL (PRIMARY) HYPERTENSION: Chronic | ICD-10-CM

## 2025-04-07 DIAGNOSIS — R07.89 CHEST PRESSURE: ICD-10-CM

## 2025-04-07 LAB
CV STRESS BASE HR: 69 BPM
DIASTOLIC BLOOD PRESSURE: 66 MMHG
OHS CV CPX 1 MINUTE RECOVERY HEART RATE: 73 BPM
OHS CV CPX 85 PERCENT MAX PREDICTED HEART RATE MALE: 123
OHS CV CPX ESTIMATED METS: 5
OHS CV CPX MAX PREDICTED HEART RATE: 145
OHS CV CPX PATIENT IS FEMALE: 0
OHS CV CPX PATIENT IS MALE: 1
OHS CV CPX PEAK DIASTOLIC BLOOD PRESSURE: 76 MMHG
OHS CV CPX PEAK HEAR RATE: 99 BPM
OHS CV CPX PEAK RATE PRESSURE PRODUCT: NORMAL
OHS CV CPX PEAK SYSTOLIC BLOOD PRESSURE: 135 MMHG
OHS CV CPX PERCENT MAX PREDICTED HEART RATE ACHIEVED: 68
OHS CV CPX RATE PRESSURE PRODUCT PRESENTING: 9108
STRESS ECHO POST EXERCISE DUR MIN: 3 MINUTES
SYSTOLIC BLOOD PRESSURE: 132 MMHG

## 2025-04-07 PROCEDURE — 93016 CV STRESS TEST SUPVJ ONLY: CPT | Mod: ,,, | Performed by: INTERNAL MEDICINE

## 2025-04-07 PROCEDURE — 93018 CV STRESS TEST I&R ONLY: CPT | Mod: ,,, | Performed by: INTERNAL MEDICINE

## 2025-04-07 PROCEDURE — 93017 CV STRESS TEST TRACING ONLY: CPT

## 2025-04-08 ENCOUNTER — TELEPHONE (OUTPATIENT)
Dept: CARDIOLOGY | Facility: CLINIC | Age: 75
End: 2025-04-08
Payer: COMMERCIAL

## 2025-04-08 ENCOUNTER — TELEPHONE (OUTPATIENT)
Dept: FAMILY MEDICINE | Facility: CLINIC | Age: 75
End: 2025-04-08
Payer: COMMERCIAL

## 2025-04-08 DIAGNOSIS — E66.01 SEVERE OBESITY (BMI 35.0-35.9 WITH COMORBIDITY): Chronic | ICD-10-CM

## 2025-04-08 DIAGNOSIS — G45.9 TIA (TRANSIENT ISCHEMIC ATTACK): ICD-10-CM

## 2025-04-08 DIAGNOSIS — R73.03 PREDIABETES: Chronic | ICD-10-CM

## 2025-04-08 RX ORDER — SEMAGLUTIDE 0.25 MG/.5ML
0.25 INJECTION, SOLUTION SUBCUTANEOUS
Qty: 2 ML | Refills: 0 | Status: SHIPPED | OUTPATIENT
Start: 2025-04-08 | End: 2025-05-06

## 2025-04-08 NOTE — TELEPHONE ENCOUNTER
Called the patient on today to follow up with him about medications, but it went to his . Was unable to leave the patient a message due to his mailbox not being set up.    Patient returned my call, patient explained that he is calling to see if  can prescribe him Ozempic. Informed the patient that  is currently in the cath lab, but I will discuss this with him and follow back up with him this afternoon or on tomorrow. Patient verbalized understanding. ----- Message from Soraida sent at 4/8/2025 10:48 AM CDT -----  Regarding: Following up on Insurance for Medication  Who Called: Chano Frank said his insurance denied Ozempic and Wegovy because there is no proof of a stroke or heart issues. He is wondering what he needs to do to get that information since these medications were recommended. Patient's Preferred Phone Number on File: [phone]

## 2025-04-08 NOTE — TELEPHONE ENCOUNTER
Please send to [facility] wegovy was approved from all dosages until 4/8/2026

## 2025-04-08 NOTE — TELEPHONE ENCOUNTER
Sent to [facility].  Needs 1 mth f/u.  Make sure he knows to drink a lot of water, and make sure he gets enough protein in his diet.

## 2025-04-16 ENCOUNTER — TELEPHONE (OUTPATIENT)
Dept: CARDIOLOGY | Facility: CLINIC | Age: 75
End: 2025-04-16
Payer: COMMERCIAL

## 2025-04-16 NOTE — TELEPHONE ENCOUNTER
Spoke with the patient on today to inform him that  does not prescribe Ozempic. Per  the patient has to discuss this with his PCP. Patient verbalized understanding. Patient also inquired about his Zio Monitor results, informed the patient his results aren't in just yet due to him just returning it a week ago. Informed the patient that he has an appointment on May 27th at 2:40 to review those results. Patient verbalized understanding.

## 2025-04-28 ENCOUNTER — TELEPHONE (OUTPATIENT)
Dept: FAMILY MEDICINE | Facility: CLINIC | Age: 75
End: 2025-04-28
Payer: COMMERCIAL

## 2025-04-28 NOTE — TELEPHONE ENCOUNTER
----- Message from Liz sent at 4/28/2025 12:55 PM CDT -----   Pt wanting Referral to a Urologist  Pt # [phone]

## 2025-04-29 ENCOUNTER — HOSPITAL ENCOUNTER (EMERGENCY)
Facility: HOSPITAL | Age: 75
Discharge: HOME | End: 2025-04-29
Attending: EMERGENCY MEDICINE
Payer: COMMERCIAL

## 2025-04-29 ENCOUNTER — TELEPHONE (OUTPATIENT)
Dept: CARDIOLOGY | Facility: CLINIC | Age: 75
End: 2025-04-29
Payer: COMMERCIAL

## 2025-04-29 VITALS
BODY MASS INDEX: 33.64 KG/M2 | DIASTOLIC BLOOD PRESSURE: 79 MMHG | HEART RATE: 71 BPM | OXYGEN SATURATION: 98 % | TEMPERATURE: 98 F | SYSTOLIC BLOOD PRESSURE: 147 MMHG | WEIGHT: 235 LBS | HEIGHT: 70 IN | RESPIRATION RATE: 11 BRPM

## 2025-04-29 DIAGNOSIS — R07.9 CHEST PAIN: ICD-10-CM

## 2025-04-29 DIAGNOSIS — E86.0 DEHYDRATION: ICD-10-CM

## 2025-04-29 DIAGNOSIS — R07.9 CHEST PAIN, UNSPECIFIED TYPE: Primary | ICD-10-CM

## 2025-04-29 LAB
ALBUMIN SERPL BCP-MCNC: 3.9 G/DL (ref 3.4–4.8)
ALBUMIN/GLOB SERPL: 1.3 {RATIO}
ALP SERPL-CCNC: 77 U/L (ref 40–150)
ALT SERPL W P-5'-P-CCNC: 10 U/L
ANION GAP SERPL CALCULATED.3IONS-SCNC: 15 MMOL/L (ref 7–16)
AST SERPL W P-5'-P-CCNC: 16 U/L (ref 11–45)
BASOPHILS # BLD AUTO: 0.03 K/UL (ref 0–0.2)
BASOPHILS NFR BLD AUTO: 0.4 % (ref 0–1)
BILIRUB SERPL-MCNC: 0.6 MG/DL
BUN SERPL-MCNC: 18 MG/DL (ref 8–26)
BUN/CREAT SERPL: 15 (ref 6–20)
CALCIUM SERPL-MCNC: 9.2 MG/DL (ref 8.8–10)
CHLORIDE SERPL-SCNC: 106 MMOL/L (ref 98–107)
CO2 SERPL-SCNC: 22 MMOL/L (ref 23–31)
CREAT SERPL-MCNC: 1.17 MG/DL (ref 0.72–1.25)
DIFFERENTIAL METHOD BLD: ABNORMAL
EGFR (NO RACE VARIABLE) (RUSH/TITUS): 65 ML/MIN/1.73M2
EOSINOPHIL # BLD AUTO: 0.17 K/UL (ref 0–0.5)
EOSINOPHIL NFR BLD AUTO: 2.1 % (ref 1–4)
ERYTHROCYTE [DISTWIDTH] IN BLOOD BY AUTOMATED COUNT: 14.5 % (ref 11.5–14.5)
GLOBULIN SER-MCNC: 3 G/DL (ref 2–4)
GLUCOSE SERPL-MCNC: 84 MG/DL (ref 82–115)
HCT VFR BLD AUTO: 41 % (ref 40–54)
HGB BLD-MCNC: 13.5 G/DL (ref 13.5–18)
IMM GRANULOCYTES # BLD AUTO: 0.02 K/UL (ref 0–0.04)
IMM GRANULOCYTES NFR BLD: 0.2 % (ref 0–0.4)
LYMPHOCYTES # BLD AUTO: 2.5 K/UL (ref 1–4.8)
LYMPHOCYTES NFR BLD AUTO: 30.9 % (ref 27–41)
MCH RBC QN AUTO: 29.7 PG (ref 27–31)
MCHC RBC AUTO-ENTMCNC: 32.9 G/DL (ref 32–36)
MCV RBC AUTO: 90.3 FL (ref 80–96)
MONOCYTES # BLD AUTO: 0.73 K/UL (ref 0–0.8)
MONOCYTES NFR BLD AUTO: 9 % (ref 2–6)
MPC BLD CALC-MCNC: 10.3 FL (ref 9.4–12.4)
NEUTROPHILS # BLD AUTO: 4.64 K/UL (ref 1.8–7.7)
NEUTROPHILS NFR BLD AUTO: 57.4 % (ref 53–65)
NRBC # BLD AUTO: 0 X10E3/UL
NRBC, AUTO (.00): 0 %
NT-PROBNP SERPL-MCNC: 29 PG/ML (ref 1–450)
PLATELET # BLD AUTO: 223 K/UL (ref 150–400)
POTASSIUM SERPL-SCNC: 4.7 MMOL/L (ref 3.5–5.1)
PROT SERPL-MCNC: 6.9 G/DL (ref 5.8–7.6)
RBC # BLD AUTO: 4.54 M/UL (ref 4.6–6.2)
SODIUM SERPL-SCNC: 138 MMOL/L (ref 136–145)
TROPONIN I SERPL HS-MCNC: 3.5 NG/L
TROPONIN I SERPL HS-MCNC: <2.7 NG/L
WBC # BLD AUTO: 8.09 K/UL (ref 4.5–11)

## 2025-04-29 PROCEDURE — 96360 HYDRATION IV INFUSION INIT: CPT

## 2025-04-29 PROCEDURE — 93005 ELECTROCARDIOGRAM TRACING: CPT

## 2025-04-29 PROCEDURE — 99285 EMERGENCY DEPT VISIT HI MDM: CPT | Mod: 25

## 2025-04-29 PROCEDURE — 85025 COMPLETE CBC W/AUTO DIFF WBC: CPT

## 2025-04-29 PROCEDURE — 83880 ASSAY OF NATRIURETIC PEPTIDE: CPT

## 2025-04-29 PROCEDURE — 25000003 PHARM REV CODE 250

## 2025-04-29 PROCEDURE — 36415 COLL VENOUS BLD VENIPUNCTURE: CPT

## 2025-04-29 PROCEDURE — 84484 ASSAY OF TROPONIN QUANT: CPT

## 2025-04-29 PROCEDURE — 80053 COMPREHEN METABOLIC PANEL: CPT

## 2025-04-29 PROCEDURE — 93010 ELECTROCARDIOGRAM REPORT: CPT | Mod: ,,, | Performed by: INTERNAL MEDICINE

## 2025-04-29 RX ADMIN — SODIUM CHLORIDE 1000 ML: 9 INJECTION, SOLUTION INTRAVENOUS at 10:04

## 2025-04-29 NOTE — TELEPHONE ENCOUNTER
Called the patient's wife on today regarding message that was left, but it went to her VM as well.     Left the patient's wife a VM to return my call when available.

## 2025-04-29 NOTE — ED PROVIDER NOTES
Encounter Date: 4/29/2025       History     Chief Complaint   Patient presents with    Chest Pain     Patient presents to the ED with c/o chest pressure to the left side of his chest that started this morning     Patient is a 74-year-old male who presents to the ED with chest pain.  Patient has a past medical history of RAYMUNDO, prediabetes, TIA and GERD.  Patient reports feeling chest pressure since this morning.  Patient points to his mid chest and states it does not radiate and feels dull in nature.  Patient denies being in any pain.  Patient states nothing makes it better or worse and does have some SOB which is close to his baseline.  Patient is followed by Dr. Ortega and recently completed a Zio patch but does not have the results yet.  Patient's wife is in the room who also contributes to the history.  Patient states about 2 months ago was in the hospital for TIA and started on aspirin and Plavix.  Patient denies headache, abdominal pain or lower extremity edema.        Review of patient's allergies indicates:  No Known Allergies  Past Medical History:   Diagnosis Date    Anxiety disorder, unspecified     Chronic bilateral low back pain with left-sided sciatica 04/12/2022    Chronic rhinitis 04/12/2022    Depressive disorder     Diabetic eye exam 09/20/2023    Dr. [facility] [place]    Essential (primary) hypertension     GERD (gastroesophageal reflux disease)     History of colon polyps 04/12/2022    Hyperlipidemia     Laceration of left wrist 11/21/2023    RAYMUNDO on CPAP     Prediabetes 04/19/2023    Lab Results Component Value Date  HGBA1C 6.3 04/19/2023      RLS (restless legs syndrome)     TIA (transient ischemic attack)      Past Surgical History:   Procedure Laterality Date    BACK SURGERY      Caudal HARPER  02/18/2013    Dr Rdz    FOOT SURGERY Left     Left l4-L5 TFESI Left 06/20/2012    Dr Rdz    MOLE REMOVAL      NERVE REPAIR Left 11/21/2023    Procedure: REPAIR, SUPERFICIAL BRANCH OF  RADIAL   NERVE;  Surgeon: Val Clifton MD;  Location: [facility];  Service: General;  Laterality: Left;    REPAIR OF TENDON OF UPPER EXTREMITY Left 11/21/2023    Procedure: REPAIR BRACHIORADIALIS TENDON;  Surgeon: Val Clifton MD;  Location: [facility] OR;  Service: General;  Laterality: Left;    REPAIR, ARTERY, RADIAL Left 11/21/2023    Procedure: REPAIR/LIGATION, ARTERY, RADIAL;  Surgeon: Val Clifton MD;  Location: [facility] OR;  Service: General;  Laterality: Left;    SHOULDER ARTHROSCOPY Bilateral     T3-T4 HARPER  02/27/2012    Dr Rdz    TONSILLECTOMY      UVULECTOMY       Family History   Problem Relation Name Age of Onset    Heart disease Mother      Rheumatic fever Mother      Heart disease Father      Heart attack Father      No Known Problems Sister      Arthritis Sister      Cancer Brother          prostate ca    Cancer Brother          prostate ca    No Known Problems Maternal Grandmother      No Known Problems Maternal Grandfather      No Known Problems Paternal Grandmother      No Known Problems Paternal Grandfather      Hyperlipidemia Son      Hypertension Son       Social History[1]  Review of Systems   Constitutional:  Negative for activity change, appetite change, chills and diaphoresis.   Respiratory:  Positive for shortness of breath. Negative for cough, wheezing and stridor.    Cardiovascular:  Positive for chest pain. Negative for palpitations and leg swelling.   Gastrointestinal:  Negative for abdominal pain, constipation and diarrhea.   Musculoskeletal:  Negative for arthralgias and back pain.   Neurological:  Negative for dizziness, facial asymmetry, light-headedness and numbness.   Psychiatric/Behavioral:  Negative for confusion, decreased concentration and dysphoric mood. The patient is not hyperactive.    All other systems reviewed and are negative.      Physical Exam     Initial Vitals [04/29/25 0935]   BP Pulse Resp Temp SpO2   (!) 109/57 78 10 98 °F (36.7 °C) (!) 93 %       MAP       --         Physical Exam    Nursing note and vitals reviewed.  Constitutional: He appears well-developed and well-nourished.   HENT:   Head: Normocephalic and atraumatic.   Eyes: Conjunctivae are normal.   Cardiovascular:  Normal rate, regular rhythm and normal heart sounds.           Pulmonary/Chest: Breath sounds normal. No respiratory distress. He has no wheezes.   Abdominal: Abdomen is soft. He exhibits no distension. There is no abdominal tenderness. There is no rebound and no guarding.     Neurological: He is alert and oriented to person, place, and time.   Psychiatric: He has a normal mood and affect. His behavior is normal. Thought content normal.         Medical Screening Exam   See Full Note    ED Course   Procedures  Labs Reviewed   COMPREHENSIVE METABOLIC PANEL - Abnormal       Result Value    Sodium 138      Potassium 4.7      Chloride 106      CO2 22 (*)     Anion Gap 15      Glucose 84      BUN 18      Creatinine 1.17      BUN/Creatinine Ratio 15      Calcium 9.2      Total Protein 6.9      Albumin 3.9      Globulin 3.0      A/G Ratio 1.3      Bilirubin, Total 0.6      Alk Phos 77      ALT 10      AST 16      eGFR 65     CBC WITH DIFFERENTIAL - Abnormal    WBC 8.09      RBC 4.54 (*)     Hemoglobin 13.5      Hematocrit 41.0      MCV 90.3      MCH 29.7      MCHC 32.9      RDW 14.5      Platelet Count 223      MPV 10.3      Neutrophils % 57.4      Lymphocytes % 30.9      Monocytes % 9.0 (*)     Eosinophils % 2.1      Basophils % 0.4      Immature Granulocytes % 0.2      nRBC, Auto 0.0      Neutrophils, Abs 4.64      Lymphocytes, Absolute 2.50      Monocytes, Absolute 0.73      Eosinophils, Absolute 0.17      Basophils, Absolute 0.03      Immature Granulocytes, Absolute 0.02      nRBC, Absolute 0.00      Diff Type Auto     TROPONIN I - Normal    Troponin I High Sensitivity <2.7     NT-PRO NATRIURETIC PEPTIDE - Normal    ProBNP 29     TROPONIN I - Normal    Troponin I High Sensitivity 3.5      CBC W/ AUTO DIFFERENTIAL    Narrative:     The following orders were created for panel order CBC auto differential.  Procedure                               Abnormality         Status                     ---------                               -----------         ------                     CBC with Differential[[id number]]       Abnormal            Final result                 Please view results for these tests on the individual orders.          Imaging Results              X-Ray Chest AP Portable (Final result)  Result time 04/29/25 11:15:36      Final result by Cesar Eubanks MD (04/29/25 11:15:36)                   Impression:      Mild interstitial prominence bilaterally may relate to pulmonary vascular congestion/edema versus infectious or noninfectious inflammatory etiology.      Electronically signed by: Cesar Eubanks  Date:    04/29/2025  Time:    11:15               Narrative:    EXAMINATION:  XR CHEST AP PORTABLE    CLINICAL HISTORY:  Chest pain, unspecified    TECHNIQUE:  Single frontal view of the chest was performed.    COMPARISON:  Chest radiograph performed 03/03/2025.    FINDINGS:  Monitoring leads over the chest.  Grossly unchanged cardiac contours    Mild interstitial prominence is noted bilaterally.    No definite pneumothorax or large volume pleural effusion.    No acute findings in the visualized abdomen.  Osseous and soft tissue structures appear without definite acute change.  Partially imaged operative sequela of the proximal left humerus.                                       Medications   sodium chloride 0.9% bolus 1,000 mL 1,000 mL (0 mLs Intravenous Stopped 4/29/25 1136)     Medical Decision Making  Differential diagnosis discussed with Dr. Villagomez.  Which includes STEMI, NSTEMI, pneumonia, pneumothorax, angina, costochondritis and ACS.    Amount and/or Complexity of Data Reviewed  Labs: ordered.  Radiology: ordered.               ED Course as of 04/29/25 1339   Tue Apr 29, 2025    1013 I have personally seen and examined patient and agree with history and physical exam documented by resident.  On my exam patient is complaining of vague chest tightness.  On physical exam heart sounds are normal, lung sounds are normal. [BB]   1014 Medical decision-making:  Differential diagnosis includes STEMI, NSTEMI, ACS, pneumonia, GERD.  All testing ordered and interpreted by me. [BB]   1114 Troponin is normal.  Pro BNP is normal.  CBC is normal.  CMP is normal. [BB]   1114 Chest x-ray by my interpretation shows no acute disease. [BB]   1119 Awaiting repeat troponin. [BB]   1334 Repeat troponin is normal and not significantly changed from initial troponin. [BB]   1335 Review of outside medical record shows normal echocardiogram and normal exercise stress test done last month. [BB]      ED Course User Index  [BB] Abdelrahman Villagomez MD                           Clinical Impression:   Final diagnoses:  [R07.9] Chest pain, unspecified type (Primary)  [E86.0] Dehydration        ED Disposition Condition    Discharge Stable          ED Prescriptions    None       Follow-up Information    None            Sj Silva MD  Resident  25 1001         Sj Silva MD  Resident  25 1015       Sj Silva MD  Resident  25 1120       [1]   Social History  Tobacco Use    Smoking status: Former     Current packs/day: 0.00     Average packs/day: 1 pack/day for 10.0 years (10.0 ttl pk-yrs)     Types: Cigarettes     Start date:      Quit date:      Years since quittin.3     Passive exposure: Never    Smokeless tobacco: Never    Tobacco comments:     Use to be a smoker, quit in . Stated he smoked for about 10 years   Substance Use Topics    Alcohol use: Yes     Alcohol/week: 1.0 standard drink of alcohol     Types: 1 Cans of beer per week     Comment: Occasionally    Drug use: Never        Abdelrahman Villagomez MD  25 [id number]

## 2025-04-29 NOTE — DISCHARGE INSTRUCTIONS
Continue current medications as prescribed.  Follow up in clinic with cardiologist to discuss chest pain.  Return to emergency department for any worsening or further problems.

## 2025-04-29 NOTE — TELEPHONE ENCOUNTER
Called the patient on today, but it went to his . Was unable to leave the patient a message due to his mailbox not being set up. ----- Message from Angeles sent at 4/29/2025  8:29 AM CDT -----  Regarding: PROBLEM  Who Called: Chano Baldwin Yahairalevar is requesting assistance/information from provider's office.Symptoms (please be specific): TIA, CHEST PRESSURE How long has patient had these symptoms:  HAD TIA ON FRIDAY, CHEST PRESSURE THIS MORNINGList of preferred pharmacies on file (remove unneeded): [unfilled]If different, enter pharmacy into here including location and phone number: Preferred Method of Contact: Phone CallPatient's Preferred Phone Number on File: [phone] Best Call Back Number, if different:Additional Information:

## 2025-04-30 ENCOUNTER — TELEPHONE (OUTPATIENT)
Dept: FAMILY MEDICINE | Facility: CLINIC | Age: 75
End: 2025-04-30
Payer: COMMERCIAL

## 2025-04-30 ENCOUNTER — TELEPHONE (OUTPATIENT)
Dept: CARDIOLOGY | Facility: CLINIC | Age: 75
End: 2025-04-30
Payer: COMMERCIAL

## 2025-04-30 ENCOUNTER — TELEPHONE (OUTPATIENT)
Dept: EMERGENCY MEDICINE | Facility: HOSPITAL | Age: 75
End: 2025-04-30
Payer: COMMERCIAL

## 2025-04-30 DIAGNOSIS — G45.9 TIA (TRANSIENT ISCHEMIC ATTACK): ICD-10-CM

## 2025-04-30 DIAGNOSIS — E66.01 SEVERE OBESITY (BMI 35.0-35.9 WITH COMORBIDITY): Chronic | ICD-10-CM

## 2025-04-30 LAB
OHS QRS DURATION: 98 MS
OHS QTC CALCULATION: 416 MS

## 2025-04-30 RX ORDER — SEMAGLUTIDE 0.5 MG/.5ML
0.5 INJECTION, SOLUTION SUBCUTANEOUS
Qty: 2 ML | Refills: 0 | Status: SHIPPED | OUTPATIENT
Start: 2025-05-05

## 2025-04-30 NOTE — TELEPHONE ENCOUNTER
Appointment with Bean Powell scheduled for May 15th at 10:00.    Patient is tolerating the Wegovy well and wants to proceed with the increase to 0.5mg

## 2025-04-30 NOTE — TELEPHONE ENCOUNTER
Returned call to patient's wife.   No answer on wife nor patient's phone listed.   Left  with callback [phone] on patient's phone asking for callback to see what the wife was needing to inform Dr. Ortega's office.  -HW

## 2025-04-30 NOTE — TELEPHONE ENCOUNTER
I do not know why they did not order a Neurology outpatient appointment after hospital discharge but I will get this entered.    I see that he was finally approved for Wegovy and the 1st prescription was dispensed 4/09/25 which is a 4 week supply and his one-mth f/u is not scheduled with me until 05/20/25.  I guess we did not have anything any sooner?  If not, if he is tolerating it okay I will go ahead and send the 0.5 mg so he does not run out before he sees me.

## 2025-04-30 NOTE — TELEPHONE ENCOUNTER
----- Message from Yang sent at 4/29/2025  4:50 PM CDT -----  Who Called: Chano Gonzalez the patient know what this is regarding?:PT. Wife want to talk to nurse about  he went to the hospital todayPreferred Method of Contact: Phone CallPatient's Preferred Phone Number on File: [phone] Best Call Back Number, if different:[phone] Information:

## 2025-04-30 NOTE — TELEPHONE ENCOUNTER
----- Message from Liz sent at 4/30/2025 10:32 AM CDT -----   Pt need referral  Neurologist Pt #[phone]

## 2025-04-30 NOTE — TELEPHONE ENCOUNTER
Spoke with patient and he states he was talking to his wife last Friday and wife  told him she could not understand what he was saying.  Had a TIA last month.  Patient denies any chest pain or slurring speech today( did have some on Friday but did not go to the ER)

## 2025-05-01 ENCOUNTER — TELEPHONE (OUTPATIENT)
Dept: CARDIOLOGY | Facility: CLINIC | Age: 75
End: 2025-05-01
Payer: COMMERCIAL

## 2025-05-01 RX ORDER — CLOPIDOGREL BISULFATE 75 MG/1
75 TABLET ORAL DAILY
Qty: 30 TABLET | Refills: 5 | OUTPATIENT
Start: 2025-05-01

## 2025-05-01 NOTE — TELEPHONE ENCOUNTER
Primary Nurse Emil Keane RN and Nicole Cagle RN performed a dual skin assessment on this patient No impairment noted  Cirilo score is 17 Spoke with pt about recommendations. Pt verbalized understanding. Pt also requesting RF on plavix.    Reviewed note with . Pt to continue to increase fluid intake and if any symptoms happen again, recommend ER visit. No answer at pt number. LVM to return call.    Called pt back concerning below msg. Pt states last Friday he was outside doing some intense physical work when he started not feeling well. Pt states he did not have any CP, SOB, palpitations at this time. States he came inside to rest and spoke to his wife. She told him his speech was incoherent as if he was 'speaking another language.' Pt states when he was hearing himself talk, it sounded normal. Pt denies wife noticing any other symptoms. He states this episode lasted about 30 minutes, and it started off with him feeling anxious while doing his outside work. Pt states he had another episode on Tuesday of this week where he had chest pressure and anxiousness. States he did not have chest pain or SOB, only pressure. States he chewed up an ASA which gave some relief, but he went to the ER where he was dx with dehydration.  Explained to pt I will review this with  and give him a call back. Pt verbalized understanding.   ----- Message from Kareem sent at 4/30/2025 10:20 AM CDT -----  Who Called: Chano An's Preferred Phone Number on File: [phone] Best Call Back Number, if different:Additional Information: pt asked if you can give him a call back to see and discuss what is going on with him

## 2025-05-02 ENCOUNTER — TELEPHONE (OUTPATIENT)
Dept: CARDIOLOGY | Facility: CLINIC | Age: 75
End: 2025-05-02
Payer: COMMERCIAL

## 2025-05-02 NOTE — TELEPHONE ENCOUNTER
Spoke with the patient on today, he was following up about his plavix. Informed the patient that the prescription is awaiting  signature and that it will be signed today. Patient verbalized understanding. ----- Message from Kareem sent at 5/2/2025  9:23 AM CDT -----  Who Called: Chano An's Preferred Phone Number on File: [phone] Best Call Back Number, if different:Additional Information: pt stated he has not gotten a call about his new prescription  (Plavix

## 2025-05-05 RX ORDER — CLOPIDOGREL BISULFATE 75 MG/1
75 TABLET ORAL DAILY
Qty: 30 TABLET | Refills: 5 | Status: SHIPPED | OUTPATIENT
Start: 2025-05-05

## 2025-05-06 ENCOUNTER — TELEPHONE (OUTPATIENT)
Dept: CARDIOLOGY | Facility: CLINIC | Age: 75
End: 2025-05-06
Payer: COMMERCIAL

## 2025-05-06 NOTE — TELEPHONE ENCOUNTER
Called the patient on today to make sure that he received his prescription that was sent in on yesterday, but it went to his VM.    Left the patient a VM to return my call when he is available.

## 2025-05-14 ENCOUNTER — TELEPHONE (OUTPATIENT)
Dept: FAMILY MEDICINE | Facility: CLINIC | Age: 75
End: 2025-05-14
Payer: COMMERCIAL

## 2025-05-14 DIAGNOSIS — I10 ESSENTIAL (PRIMARY) HYPERTENSION: Chronic | ICD-10-CM

## 2025-05-14 DIAGNOSIS — I25.10 ATHEROSCLEROTIC CARDIOVASCULAR DISEASE: Chronic | ICD-10-CM

## 2025-05-14 DIAGNOSIS — R00.2 PALPITATIONS: Primary | ICD-10-CM

## 2025-05-14 NOTE — TELEPHONE ENCOUNTER
Patient called stating he is having heart palpitations requesting referral to [facility] cardiology to Dr Meade.

## 2025-05-15 ENCOUNTER — OFFICE VISIT (OUTPATIENT)
Dept: NEUROLOGY | Facility: CLINIC | Age: 75
End: 2025-05-15
Payer: COMMERCIAL

## 2025-05-15 VITALS
HEART RATE: 71 BPM | OXYGEN SATURATION: 99 % | BODY MASS INDEX: 33.88 KG/M2 | HEIGHT: 70 IN | DIASTOLIC BLOOD PRESSURE: 58 MMHG | SYSTOLIC BLOOD PRESSURE: 108 MMHG | WEIGHT: 236.63 LBS

## 2025-05-15 DIAGNOSIS — G45.9 TIA (TRANSIENT ISCHEMIC ATTACK): Primary | ICD-10-CM

## 2025-05-15 DIAGNOSIS — E78.49 OTHER HYPERLIPIDEMIA: Chronic | ICD-10-CM

## 2025-05-15 DIAGNOSIS — I10 ESSENTIAL (PRIMARY) HYPERTENSION: Chronic | ICD-10-CM

## 2025-05-15 DIAGNOSIS — R41.0 ACUTE CONFUSION: ICD-10-CM

## 2025-05-15 PROBLEM — R00.2 PALPITATIONS: Status: ACTIVE | Noted: 2025-05-15

## 2025-05-15 PROCEDURE — 99999 PR PBB SHADOW E&M-EST. PATIENT-LVL V: CPT | Mod: PBBFAC,,, | Performed by: NURSE PRACTITIONER

## 2025-05-15 PROCEDURE — 1125F AMNT PAIN NOTED PAIN PRSNT: CPT | Mod: CPTII,,, | Performed by: NURSE PRACTITIONER

## 2025-05-15 PROCEDURE — 99203 OFFICE O/P NEW LOW 30 MIN: CPT | Mod: S$PBB,,, | Performed by: NURSE PRACTITIONER

## 2025-05-15 PROCEDURE — 3288F FALL RISK ASSESSMENT DOCD: CPT | Mod: CPTII,,, | Performed by: NURSE PRACTITIONER

## 2025-05-15 PROCEDURE — 3078F DIAST BP <80 MM HG: CPT | Mod: CPTII,,, | Performed by: NURSE PRACTITIONER

## 2025-05-15 PROCEDURE — 1160F RVW MEDS BY RX/DR IN RCRD: CPT | Mod: CPTII,,, | Performed by: NURSE PRACTITIONER

## 2025-05-15 PROCEDURE — 3044F HG A1C LEVEL LT 7.0%: CPT | Mod: CPTII,,, | Performed by: NURSE PRACTITIONER

## 2025-05-15 PROCEDURE — 99215 OFFICE O/P EST HI 40 MIN: CPT | Mod: PBBFAC | Performed by: NURSE PRACTITIONER

## 2025-05-15 PROCEDURE — 1101F PT FALLS ASSESS-DOCD LE1/YR: CPT | Mod: CPTII,,, | Performed by: NURSE PRACTITIONER

## 2025-05-15 PROCEDURE — 1159F MED LIST DOCD IN RCRD: CPT | Mod: CPTII,,, | Performed by: NURSE PRACTITIONER

## 2025-05-15 PROCEDURE — 3074F SYST BP LT 130 MM HG: CPT | Mod: CPTII,,, | Performed by: NURSE PRACTITIONER

## 2025-05-15 NOTE — PATIENT INSTRUCTIONS
Stop aspirin  Continue the plavix 75mg daily  EEG  Reviewed recent imaging and labwork  Notify clinic immediately if any possibly stroke like symptoms    Stroke risk modifiers:    1. Good sleep habits, recommend at least 7 hours total sleep daily  2. Continue management of blood pressure and cholesterol including medications, exercise, and good eating habits  3. Exercise as much as possible, recommend 5 days of the week for 30 minutes each day  4. Avoid smoking and alcohol  5. Go to the nearest emergency department immediately if any new or worsening weakness, speech difficulty, or numbness

## 2025-05-15 NOTE — PROGRESS NOTES
Subjective:       Patient ID: Chano Clement is a 74 y.o. male     Chief Complaint:    Chief Complaint   Patient presents with    Transient Ischemic Attack     New Pt states that he had a TIA 2 months ago and since has had issues with balance.        Allergies:  Patient has no known allergies.    Current Medications:  Encounter Medications[1]    History of Present Illness  73 y/o male new referral to neurology for reported TIA    He was apparently admitted 3/5/25 after he had presented with right sided weakness and reported vision changes.  Workup included MRI brain with no acute finding and no prior CVA.  CTA head and neck without significant findings.  He was apparently started on both ASA and plavix and continues on them currently, though by AAN recommendations, duel therapy not recommended past 90 days, 21 days with normal CTA head.  He reports only got started on the plavix about 2 weeks ago.  He actually had another incident of the expressive aphasia about 3 weeks ago, so this was before the plavix was started.  In total he reports about 4 possibly TIA.  The first was 30 years ago, he was started on ASA 81mg daily then and has taken it regularly since that time.  I did discuss with him about stopping the ASA, and continue the plavix.  Aggrenox is actually preferred over plavix, but will monitor for now.  On exam today I note no clear neurological deficits.    I did disucss obtaining EEG as well to and he is in agreement           Review of Systems  Review of Systems   Constitutional:  Negative for diaphoresis and fever.   HENT:  Negative for congestion, hearing loss and tinnitus.    Eyes:  Negative for blurred vision, double vision, photophobia, discharge and redness.   Respiratory:  Negative for cough and shortness of breath.    Cardiovascular:  Negative for chest pain.   Gastrointestinal:  Negative for abdominal pain, nausea and vomiting.   Musculoskeletal:  Negative for back pain, joint pain,  myalgias and neck pain.   Skin:  Negative for itching and rash.   Neurological:  Positive for speech change and weakness. Negative for dizziness, tremors, sensory change, focal weakness, seizures, loss of consciousness and headaches.   Psychiatric/Behavioral:  Negative for depression, hallucinations and memory loss. The patient does not have insomnia.    All other systems reviewed and are negative.     Objective:     NEUROLOGICAL EXAMINATION:     MENTAL STATUS   Oriented to person, place, and time.   Attention: normal. Concentration: normal.   Speech: speech is normal   Level of consciousness: alert  Knowledge: good and consistent with education.   Normal comprehension.     CRANIAL NERVES     CN II   Visual fields full to confrontation.   Visual acuity: normal  Right visual field deficit: none  Left visual field deficit: none     CN III, IV, VI   Pupils are equal, round, and reactive to light.  Extraocular motions are normal.   Right pupil: Size: 3 mm. Shape: regular. Reactivity: brisk. Consensual response: intact. Accommodation: intact.   Left pupil: Size: 3 mm. Shape: regular. Reactivity: brisk. Consensual response: intact. Accommodation: intact.   CN III: no CN III palsy  CN VI: no CN VI palsy  Nystagmus: none   Diplopia: none  Upgaze: normal  Downgaze: normal  Conjugate gaze: present  Vestibulo-ocular reflex: present    CN V   Facial sensation intact.   Right facial sensation deficit: none  Left facial sensation deficit: none  Right corneal reflex: normal  Left corneal reflex: normal    CN VII   Facial expression full, symmetric.   Right facial weakness: none  Left facial weakness: none  Right taste: normal  Left taste: normal    CN VIII   CN VIII normal.   Hearing: intact    CN IX, X   CN IX normal.   CN X normal.   Palate: symmetric    CN XI   CN XI normal.   Right sternocleidomastoid strength: normal  Left sternocleidomastoid strength: normal  Right trapezius strength: normal  Left trapezius strength:  normal    CN XII   CN XII normal.   Tongue: not atrophic  Fasciculations: absent  Tongue deviation: none    MOTOR EXAM   Muscle bulk: normal  Overall muscle tone: normal  Right arm tone: normal  Left arm tone: normal  Right arm pronator drift: absent  Left arm pronator drift: absent  Right leg tone: normal  Left leg tone: normal    Strength   Right neck flexion: 5/5  Left neck flexion: 5/5  Right neck extension: 5/5  Left neck extension: 5/5  Right deltoid: 5/5  Left deltoid: 5/5  Right biceps: 5/5  Left biceps: 5/5  Right triceps: 5/5  Left triceps: 5/5  Right wrist flexion: 5/5  Left wrist flexion: 5/5  Right wrist extension: 5/5  Left wrist extension: 5/5  Right interossei: 5/5  Left interossei: 5/5  Right iliopsoas: 5/5  Left iliopsoas: /  Right quadriceps: /  Left quadriceps: /  Right hamstrin/5  Left hamstrin/5  Right anterior tibial:   Left anterior tibial:   Right posterior tibial: /  Left posterior tibial: /5  Right gastroc: /  Left gastroc: 5/5    REFLEXES     Reflexes   Right brachioradialis: 2+  Left brachioradialis: 2+  Right biceps: 2+  Left biceps: 2+  Right triceps: 2+  Left triceps: 2+  Right patellar: 2+  Left patellar: 2+  Right achilles: 2+  Left achilles: 2+  Right plantar: normal  Left plantar: normal  Right Hooks: absent  Left Hooks: absent  Right ankle clonus: absent  Left ankle clonus: absent  Right pendular knee jerk: absent  Left pendular knee jerk: absent    SENSORY EXAM   Light touch normal.   Right arm light touch: normal  Left arm light touch: normal  Right leg light touch: normal  Left leg light touch: normal  Vibration normal.   Right arm vibration: normal  Left arm vibration: normal  Right leg vibration: normal  Left leg vibration: normal  Proprioception normal.   Right arm proprioception: normal  Left arm proprioception: normal  Right leg proprioception: normal  Left leg proprioception: normal  Pinprick normal.   Right arm pinprick: normal  Left arm  pinprick: normal  Right leg pinprick: normal  Left leg pinprick: normal  Graphesthesia: normal  Romberg: negative  Stereognosis: normal    GAIT AND COORDINATION     Gait  Gait: normal     Coordination   Finger to nose coordination: normal  Heel to shin coordination: normal  Tandem walking coordination: normal    Tremor   Resting tremor: absent  Intention tremor: absent  Action tremor: absent       Physical Exam  Vitals and nursing note reviewed.   Constitutional:       Appearance: Normal appearance.   HENT:      Head: Normocephalic.   Eyes:      Extraocular Movements: EOM normal.      Pupils: Pupils are equal, round, and reactive to light.   Cardiovascular:      Rate and Rhythm: Normal rate.   Pulmonary:      Effort: Pulmonary effort is normal.   Musculoskeletal:         General: Normal range of motion.      Cervical back: Normal range of motion and neck supple.   Skin:     General: Skin is warm and dry.   Neurological:      General: No focal deficit present.      Mental Status: He is alert and oriented to person, place, and time.      Cranial Nerves: No cranial nerve deficit.      Sensory: No sensory deficit.      Motor: No weakness.      Coordination: Coordination normal. Finger-Nose-Finger Test, Heel to Shin Test and Romberg Test normal.      Gait: Gait is intact. Gait and tandem walk normal.      Deep Tendon Reflexes: Reflexes normal.      Reflex Scores:       Tricep reflexes are 2+ on the right side and 2+ on the left side.       Bicep reflexes are 2+ on the right side and 2+ on the left side.       Brachioradialis reflexes are 2+ on the right side and 2+ on the left side.       Patellar reflexes are 2+ on the right side and 2+ on the left side.       Achilles reflexes are 2+ on the right side and 2+ on the left side.  Psychiatric:         Mood and Affect: Mood normal.         Speech: Speech normal.         Behavior: Behavior normal.          Assessment:     Problem List Items Addressed This Visit           Neuro    TIA (transient ischemic attack) - Primary    Acute confusion    Relevant Orders    EEG       Cardiac/Vascular    Essential (primary) hypertension (Chronic)    Other hyperlipidemia (Chronic)        Primary Diagnosis and ICD10  TIA (transient ischemic attack) [G45.9]    Plan:     Patient Instructions   Stop aspirin  Continue the plavix 75mg daily  EEG  Reviewed recent imaging and labwork  Notify clinic immediately if any possibly stroke like symptoms    Stroke risk modifiers:    1. Good sleep habits, recommend at least 7 hours total sleep daily  2. Continue management of blood pressure and cholesterol including medications, exercise, and good eating habits  3. Exercise as much as possible, recommend 5 days of the week for 30 minutes each day  4. Avoid smoking and alcohol  5. Go to the nearest emergency department immediately if any new or worsening weakness, speech difficulty, or numbness     There are no discontinued medications.    Requested Prescriptions      No prescriptions requested or ordered in this encounter       Orders Placed This Encounter   Procedures    EEG            [1]   Outpatient Encounter Medications as of 5/15/2025   Medication Sig Dispense Refill    albuterol (VENTOLIN HFA) 90 mcg/actuation inhaler Inhale 2 puffs into the lungs every 4 (four) hours as needed for Wheezing or Shortness of Breath. Rescue 18 g 1    alfuzosin (UROXATRAL) 10 mg Tb24 Take 1 tablet (10 mg total) by mouth daily with breakfast. 90 tablet 3    atorvastatin (LIPITOR) 40 MG tablet Take 1 tablet (40 mg total) by mouth once daily. 90 tablet 0    cetirizine (ZYRTEC) 10 MG tablet Take 1 tablet (10 mg total) by mouth once daily. 90 tablet 3    clopidogreL (PLAVIX) 75 mg tablet Take 1 tablet (75 mg total) by mouth once daily. 30 tablet 5    cyclobenzaprine (FLEXERIL) 10 MG tablet Take 1 tablet (10 mg total) by mouth nightly as needed for Muscle spasms. 90 tablet 1    diclofenac sodium (VOLTAREN) 1 % Gel Apply to back four  times daily as needed for pain 900 g 11    DULoxetine (CYMBALTA) 60 MG capsule Take 1 capsule (60 mg total) by mouth 2 (two) times daily. 180 capsule 3    gabapentin (NEURONTIN) 600 MG tablet Take 2 tablets (1,200 mg total) by mouth 2 (two) times daily. 360 tablet 1    HYDROcodone-acetaminophen (NORCO)  mg per tablet Take 1 tablet by mouth 3 (three) times daily as needed for Pain. 90 tablet 0    lisinopriL (PRINIVIL,ZESTRIL) 40 MG tablet Take 1 tablet (40 mg total) by mouth once daily. 90 tablet 3    metFORMIN (GLUCOPHAGE-XR) 500 MG ER 24hr tablet Take 2 tablets (1,000 mg total) by mouth Daily. (Patient taking differently: Take 500 mg by mouth Daily.) 180 tablet 3    multivitamin (THERAGRAN) per tablet Take 1 tablet by mouth once daily.      omega-3 fatty acids/fish oil (FISH OIL-OMEGA-3 FATTY ACIDS) 300-1,000 mg capsule Take 1 capsule by mouth once daily.      pramipexole (MIRAPEX) 0.25 MG tablet Take 1 tablet (0.25 mg total) by mouth every evening. 90 tablet 3    semaglutide, weight loss, (WEGOVY) 0.5 mg/0.5 mL PnIj Inject 0.5 mg into the skin every 7 days. 2 mL 0    traZODone (DESYREL) 50 MG tablet Take 4 tablets (200 mg total) by mouth every evening. 360 tablet 3    aspirin (ECOTRIN) 81 MG EC tablet Take 1 tablet (81 mg total) by mouth once daily. for 21 days 21 tablet 0    [DISCONTINUED] clopidogreL (PLAVIX) 75 mg tablet Take 1 tablet (75 mg total) by mouth once daily. 21 tablet 0     No facility-administered encounter medications on file as of 5/15/2025.

## 2025-05-21 ENCOUNTER — PROCEDURE VISIT (OUTPATIENT)
Dept: NEUROLOGY | Facility: HOSPITAL | Age: 75
End: 2025-05-21
Attending: NURSE PRACTITIONER
Payer: COMMERCIAL

## 2025-05-21 DIAGNOSIS — R41.0 ACUTE CONFUSION: ICD-10-CM

## 2025-05-21 PROCEDURE — 95813 EEG EXTND MNTR 61-119 MIN: CPT

## 2025-05-21 NOTE — PROCEDURES
ELECTROENCEPHALOGRAM REPORT    DATE OF SERVICE: 05/21/2025  EEG NUMBER: [id number]  REQUESTED BY: ARGENTINA Frost   LOCATION OF SERVICE: [facility]    METHODOLOGY   Electroencephalographic (EEG) recording is with electrodes placed according to the International 10-20 placement system.  Thirty two (32) channels of digital signal (sampling rate of 512/sec) including T1 and T2 was simultaneously recorded from the scalp and may include  EKG, EMG, and/or eye monitors.  Recording band pass was 0.1 to 512 hz.  Digital video recording of the patient is simultaneously recorded with the EEG.  The patient is instructed report clinical symptoms which may occur during the recording session.  EEG and video recording is stored and archived in digital format. Activation procedures which include photic stimulation, hyperventilation and instructing patients to perform simple task are done in selected patients.    The EEG is displayed on a monitor screen and can be reviewed using different montages.  Computer assisted analysis is employed to detect spike and electrographic seizure activity.   The entire record is submitted for computer analysis.  The entire recording is visually reviewed and the times identified by computer analysis as being spikes or seizures are reviewed again.  Compresses spectral analysis (CSA) is also performed on the activity recorded from each individual channel.  This is displayed as a power display of frequencies from 0 to 30 Hz over time.   The CSA is reviewed looking for asymmetries in power between homologous areas of the scalp and then compared with the original EEG recording.     [employer] Insight software was also utilized in the review of this study.  This software suite analyzes the EEG recording in multiple domains.  Coherence and rhythmicity is computed to identify EEG sections which may contain organized seizures.  Each channel undergoes analysis to detect presence of spike and sharp waves  which have special and morphological characteristic of epileptic activity.  The routine EEG recording is converted from spacial into frequency domain.  This is then displayed comparing homologous areas to identify areas of significant asymmetry.  Algorithm to identify non-cortically generated artifact is used to separate eye movement, EMG and other artifact from the EEG    EEG FINDINGS  During the maximally alert state, a 8-9 Hz posterior dominant rhythm was seen which was symmetrical, well-regulated and briskly attenuated to eye opening. In the more anterior head regions, symmetric frontocentral beta frequencies predominated.  As drowsiness occurred, the posterior dominant rhythm attenuated, slow rolling eye movements appeared, and symmetrical vertex sharp transients were seen.  Stage N2 sleep was reached and was characterized proc by high amplitude K-complexes and 12-15 Hz symmetrical and synchronous frontocentral sleep spindles.    Focal slowing in the form of intermittent 2-3 Hz activities noted maximal in T7>P7>O1    No epileptiform findings were noted, no electrographic seizures were seen, and no clinical events were reported.    Activation Procedures   Hyperventilation - no change in cortical rhythms   Photic Stimulation     - occipital driving - YES    - Pathological discharges produced - NO      IMPRESSION:  Intermittent reactive delta activity, left regional, temporoparietal occipital    CLINICAL CORRELATION:  The intermittent focal slowing in the left temporoparietal occipital region is suggestive of structural or functional abnormality in the region. No epileptiform findings were noted, no electrographic seizures were seen, and no clinical events were reported.    Alonzo Craig MD  Neurology

## 2025-05-22 ENCOUNTER — RESULTS FOLLOW-UP (OUTPATIENT)
Dept: NEUROLOGY | Facility: CLINIC | Age: 75
End: 2025-05-22
Payer: COMMERCIAL

## 2025-05-23 ENCOUNTER — TELEPHONE (OUTPATIENT)
Dept: NEUROLOGY | Facility: CLINIC | Age: 75
End: 2025-05-23
Payer: COMMERCIAL

## 2025-05-23 NOTE — TELEPHONE ENCOUNTER
----- Message from ARGENTINA Puente sent at 5/22/2025 11:46 AM CDT -----  Eeg negative, need to setup in home VEEG  ----- Message -----  From: Alonzo Craig MD  Sent: 5/21/2025   4:36 PM CDT  To: ARGENTINA Frost

## 2025-05-28 ENCOUNTER — TELEPHONE (OUTPATIENT)
Dept: NEUROLOGY | Facility: CLINIC | Age: 75
End: 2025-05-28
Payer: COMMERCIAL

## 2025-05-28 NOTE — TELEPHONE ENCOUNTER
Copied from CRM #[id number]. Topic: General Inquiry - Test Results  >> May 28, 2025  9:47 AM Georgina wrote:  Who Called: Chano Clement    Caller is requesting information on test results.    Name of Test (Lab/Mammo/Etc): EEG    Date of Test:  05/21    Where the test was performed: [facility]     Ordering Provider:  Bean Powell     Preferred Method of Contact: Phone Call  Patient's Preferred Phone Number on File: [phone]   Best Call Back Number, if different:  Additional Information:

## 2025-05-28 NOTE — TELEPHONE ENCOUNTER
Returned call to pt heasked about EEG results told him it was negative and ordering in home VEEG. He v/u.        Returned call no answer.

## 2025-05-30 ENCOUNTER — TELEPHONE (OUTPATIENT)
Dept: CARDIOLOGY | Facility: CLINIC | Age: 75
End: 2025-05-30
Payer: COMMERCIAL

## 2025-05-30 ENCOUNTER — PATIENT MESSAGE (OUTPATIENT)
Dept: CARDIOLOGY | Facility: CLINIC | Age: 75
End: 2025-05-30
Payer: COMMERCIAL

## 2025-05-30 NOTE — TELEPHONE ENCOUNTER
Copied from CRM #[id number]. Topic: General Inquiry - Test Results  >> May 28, 2025 11:45 AM Georgina wrote:  Who Called: Chano Clement    Caller is requesting information on test results.    Name of Test (Lab/Mammo/Etc):  EKG, Stress test    Date of Test:  04/07    Where the test was performed: [facility]    Ordering Provider: Dr. Ortega     Preferred Method of Contact: Phone Call  Patient's Preferred Phone Number on File: [phone]   Best Call Back Number, if different:  Additional Information:  Pt missed his appt yesterday with Dr. Ortega, he is wanting to know if he can get these results over the phone or if he needs to reschedule to come in.    Called the patient on today to discuss test results, but it went to his . Patient's stress test was not diagnostic, not enough information for . Scheduled the patient an appt for June 2nd at 9:00 to discuss with .

## 2025-06-02 ENCOUNTER — OFFICE VISIT (OUTPATIENT)
Dept: CARDIOLOGY | Facility: CLINIC | Age: 75
End: 2025-06-02
Payer: COMMERCIAL

## 2025-06-02 VITALS
WEIGHT: 239 LBS | SYSTOLIC BLOOD PRESSURE: 116 MMHG | OXYGEN SATURATION: 94 % | DIASTOLIC BLOOD PRESSURE: 76 MMHG | HEIGHT: 70 IN | HEART RATE: 72 BPM | BODY MASS INDEX: 34.22 KG/M2

## 2025-06-02 DIAGNOSIS — G45.9 TIA (TRANSIENT ISCHEMIC ATTACK): ICD-10-CM

## 2025-06-02 DIAGNOSIS — E66.01 SEVERE OBESITY (BMI 35.0-35.9 WITH COMORBIDITY): Chronic | ICD-10-CM

## 2025-06-02 DIAGNOSIS — G47.33 OSA ON CPAP: Primary | Chronic | ICD-10-CM

## 2025-06-02 DIAGNOSIS — R07.9 CHEST PAIN, UNSPECIFIED TYPE: ICD-10-CM

## 2025-06-02 PROCEDURE — 3288F FALL RISK ASSESSMENT DOCD: CPT | Mod: CPTII,,, | Performed by: INTERNAL MEDICINE

## 2025-06-02 PROCEDURE — 3074F SYST BP LT 130 MM HG: CPT | Mod: CPTII,,, | Performed by: INTERNAL MEDICINE

## 2025-06-02 PROCEDURE — 3078F DIAST BP <80 MM HG: CPT | Mod: CPTII,,, | Performed by: INTERNAL MEDICINE

## 2025-06-02 PROCEDURE — 1100F PTFALLS ASSESS-DOCD GE2>/YR: CPT | Mod: CPTII,,, | Performed by: INTERNAL MEDICINE

## 2025-06-02 PROCEDURE — 3044F HG A1C LEVEL LT 7.0%: CPT | Mod: CPTII,,, | Performed by: INTERNAL MEDICINE

## 2025-06-02 PROCEDURE — 99999 PR PBB SHADOW E&M-EST. PATIENT-LVL V: CPT | Mod: PBBFAC,,, | Performed by: INTERNAL MEDICINE

## 2025-06-02 PROCEDURE — 99214 OFFICE O/P EST MOD 30 MIN: CPT | Mod: S$PBB,,, | Performed by: INTERNAL MEDICINE

## 2025-06-02 PROCEDURE — 99215 OFFICE O/P EST HI 40 MIN: CPT | Mod: PBBFAC | Performed by: INTERNAL MEDICINE

## 2025-06-02 PROCEDURE — 1126F AMNT PAIN NOTED NONE PRSNT: CPT | Mod: CPTII,,, | Performed by: INTERNAL MEDICINE

## 2025-06-05 ENCOUNTER — OFFICE VISIT (OUTPATIENT)
Dept: FAMILY MEDICINE | Facility: CLINIC | Age: 75
End: 2025-06-05
Payer: COMMERCIAL

## 2025-06-05 VITALS
TEMPERATURE: 98 F | DIASTOLIC BLOOD PRESSURE: 66 MMHG | OXYGEN SATURATION: 94 % | HEART RATE: 66 BPM | HEIGHT: 70 IN | WEIGHT: 237 LBS | BODY MASS INDEX: 33.93 KG/M2 | SYSTOLIC BLOOD PRESSURE: 125 MMHG | RESPIRATION RATE: 16 BRPM

## 2025-06-05 DIAGNOSIS — R73.03 PREDIABETES: Chronic | ICD-10-CM

## 2025-06-05 DIAGNOSIS — I10 ESSENTIAL (PRIMARY) HYPERTENSION: Chronic | ICD-10-CM

## 2025-06-05 DIAGNOSIS — I25.10 ATHEROSCLEROTIC CARDIOVASCULAR DISEASE: Chronic | ICD-10-CM

## 2025-06-05 DIAGNOSIS — G45.9 TIA (TRANSIENT ISCHEMIC ATTACK): Primary | ICD-10-CM

## 2025-06-05 DIAGNOSIS — E78.49 OTHER HYPERLIPIDEMIA: Chronic | ICD-10-CM

## 2025-06-05 DIAGNOSIS — N40.1 BENIGN LOCALIZED PROSTATIC HYPERPLASIA WITH LOWER URINARY TRACT SYMPTOMS (LUTS): Chronic | ICD-10-CM

## 2025-06-05 DIAGNOSIS — E66.01 SEVERE OBESITY (BMI 35.0-35.9 WITH COMORBIDITY): Chronic | ICD-10-CM

## 2025-06-05 PROBLEM — R41.0 ACUTE CONFUSION: Status: RESOLVED | Noted: 2025-05-15 | Resolved: 2025-06-05

## 2025-06-05 PROCEDURE — 1126F AMNT PAIN NOTED NONE PRSNT: CPT | Mod: ,,, | Performed by: NURSE PRACTITIONER

## 2025-06-05 PROCEDURE — 1101F PT FALLS ASSESS-DOCD LE1/YR: CPT | Mod: ,,, | Performed by: NURSE PRACTITIONER

## 2025-06-05 PROCEDURE — 1160F RVW MEDS BY RX/DR IN RCRD: CPT | Mod: ,,, | Performed by: NURSE PRACTITIONER

## 2025-06-05 PROCEDURE — 3074F SYST BP LT 130 MM HG: CPT | Mod: ,,, | Performed by: NURSE PRACTITIONER

## 2025-06-05 PROCEDURE — 3044F HG A1C LEVEL LT 7.0%: CPT | Mod: ,,, | Performed by: NURSE PRACTITIONER

## 2025-06-05 PROCEDURE — 3078F DIAST BP <80 MM HG: CPT | Mod: ,,, | Performed by: NURSE PRACTITIONER

## 2025-06-05 PROCEDURE — 3288F FALL RISK ASSESSMENT DOCD: CPT | Mod: ,,, | Performed by: NURSE PRACTITIONER

## 2025-06-05 PROCEDURE — 99214 OFFICE O/P EST MOD 30 MIN: CPT | Mod: ,,, | Performed by: NURSE PRACTITIONER

## 2025-06-05 PROCEDURE — 1159F MED LIST DOCD IN RCRD: CPT | Mod: ,,, | Performed by: NURSE PRACTITIONER

## 2025-06-05 RX ORDER — SEMAGLUTIDE 1 MG/.5ML
1 INJECTION, SOLUTION SUBCUTANEOUS
Qty: 2 ML | Refills: 0 | Status: SHIPPED | OUTPATIENT
Start: 2025-06-05

## 2025-06-05 RX ORDER — ATORVASTATIN CALCIUM 40 MG/1
40 TABLET, FILM COATED ORAL DAILY
Qty: 90 TABLET | Refills: 3 | Status: SHIPPED | OUTPATIENT
Start: 2025-06-05 | End: 2026-06-05

## 2025-06-05 RX ORDER — CLOPIDOGREL BISULFATE 75 MG/1
75 TABLET ORAL DAILY
Qty: 90 TABLET | Refills: 1 | Status: SHIPPED | OUTPATIENT
Start: 2025-06-05

## 2025-06-05 RX ORDER — CLOPIDOGREL BISULFATE 75 MG/1
75 TABLET ORAL DAILY
Qty: 90 TABLET | Refills: 3 | Status: CANCELLED | OUTPATIENT
Start: 2025-06-05

## 2025-06-17 ENCOUNTER — TELEPHONE (OUTPATIENT)
Dept: NEUROLOGY | Facility: CLINIC | Age: 75
End: 2025-06-17
Payer: COMMERCIAL

## 2025-06-17 NOTE — TELEPHONE ENCOUNTER
Called pt and gave him the information below from SHIV Powell NP. He v/u.        ----- Message from ARGENTINA Puente sent at 6/17/2025 11:55 AM CDT -----  He had inquired about night time skin pain and burning, I see in his primary care note he is to see rheumatology later this month and strongly recommen dthis.  He is already on neurontin high dosing and med list shows cymbalta too so from a neuropathy standpoint he is already on the medications we would normall use for this.  However, we can discuss this further at his next follow-up as he had not mentioned it during his first visit

## 2025-06-17 NOTE — TELEPHONE ENCOUNTER
See other phone call from today.      Copied from CRM #[id number]. Topic: General Inquiry - Return Call  >> Jun 17, 2025  1:15 PM Georgina wrote:  Who Called: Chano Clement    Patient is returning phone call    Who Left Message for Patient:  Does the patient know what this is regarding?: Pt said he missed a call and was returning call.       Preferred Method of Contact: Phone Call  Patient's Preferred Phone Number on File: [phone]   Best Call Back Number, if different:  Additional Information:

## 2025-07-02 ENCOUNTER — TELEPHONE (OUTPATIENT)
Dept: FAMILY MEDICINE | Facility: CLINIC | Age: 75
End: 2025-07-02
Payer: COMMERCIAL

## 2025-07-02 ENCOUNTER — TELEPHONE (OUTPATIENT)
Dept: NEUROLOGY | Facility: CLINIC | Age: 75
End: 2025-07-02
Payer: COMMERCIAL

## 2025-07-02 DIAGNOSIS — I25.10 ATHEROSCLEROTIC CARDIOVASCULAR DISEASE: Chronic | ICD-10-CM

## 2025-07-02 DIAGNOSIS — G45.9 TIA (TRANSIENT ISCHEMIC ATTACK): ICD-10-CM

## 2025-07-02 DIAGNOSIS — E66.01 SEVERE OBESITY (BMI 35.0-35.9 WITH COMORBIDITY): Chronic | ICD-10-CM

## 2025-07-02 RX ORDER — SEMAGLUTIDE 1.7 MG/.75ML
1.7 INJECTION, SOLUTION SUBCUTANEOUS
Qty: 3 ML | Refills: 0 | Status: SHIPPED | OUTPATIENT
Start: 2025-07-02

## 2025-07-02 NOTE — TELEPHONE ENCOUNTER
Returned call to pt told him we don't have results back yet. He v/u        Copied from CRM #[id number]. Topic: General Inquiry - Test Results  >> Jul 2, 2025 11:46 AM Georgina wrote:  Who Called: Chano Clement    Caller is requesting information on test results.    Name of Test (Lab/Mammo/Etc):  EEG     Date of Test:  06/20,     Where the test was performed:  At home    Ordering Provider:  Bean Powell     Preferred Method of Contact: Phone Call  Patient's Preferred Phone Number on File: [phone]   Best Call Back Number, if different:  Additional Information:

## 2025-07-02 NOTE — TELEPHONE ENCOUNTER
Copied from CRM #[id number]. Topic: Medications - Medication Refill  >> Jul 2, 2025 11:01 AM Nelly wrote:  Who Called: Chano Clement    Refill or New Rx:Refill  RX Name and Strength: semaglutide, weight loss, (WEGOVY) 1 mg/0.5 mL PnIj 2 mL 0  How is the patient currently taking it? Inject 1 mg into the skin every 7 days. - Subcutaneous      Is this a 30 day or 90 day RX: N/A  Local or Mail Order: Local  List of preferred pharmacies on file: "[id number] - [place], [place] - [address] AT [facility] [address]  [address] [facility]  [address] [place] [address]  Phone: [phone] Fax: [phone]  Hours: Not open 24 hours       Ordering Provider:  Calista Wright FNP      Preferred Method of Contact: Phone Call  Patient's Preferred Phone Number on File: [phone]     Additional Information: Pt. Would like a courtesy call from the nurse

## 2025-07-07 ENCOUNTER — OFFICE VISIT (OUTPATIENT)
Dept: FAMILY MEDICINE | Facility: CLINIC | Age: 75
End: 2025-07-07
Payer: COMMERCIAL

## 2025-07-07 VITALS
SYSTOLIC BLOOD PRESSURE: 97 MMHG | OXYGEN SATURATION: 97 % | HEIGHT: 70 IN | TEMPERATURE: 99 F | WEIGHT: 232.81 LBS | RESPIRATION RATE: 20 BRPM | HEART RATE: 73 BPM | DIASTOLIC BLOOD PRESSURE: 66 MMHG | BODY MASS INDEX: 33.33 KG/M2

## 2025-07-07 DIAGNOSIS — G45.9 TIA (TRANSIENT ISCHEMIC ATTACK): ICD-10-CM

## 2025-07-07 DIAGNOSIS — E66.01 SEVERE OBESITY (BMI 35.0-35.9 WITH COMORBIDITY): Primary | Chronic | ICD-10-CM

## 2025-07-07 DIAGNOSIS — G89.29 CHRONIC BILATERAL LOW BACK PAIN WITH LEFT-SIDED SCIATICA: Chronic | ICD-10-CM

## 2025-07-07 DIAGNOSIS — I25.10 ATHEROSCLEROTIC CARDIOVASCULAR DISEASE: Chronic | ICD-10-CM

## 2025-07-07 DIAGNOSIS — M54.42 CHRONIC BILATERAL LOW BACK PAIN WITH LEFT-SIDED SCIATICA: Chronic | ICD-10-CM

## 2025-07-07 DIAGNOSIS — I10 ESSENTIAL (PRIMARY) HYPERTENSION: Chronic | ICD-10-CM

## 2025-07-07 DIAGNOSIS — R73.03 PREDIABETES: Chronic | ICD-10-CM

## 2025-07-07 PROCEDURE — 3288F FALL RISK ASSESSMENT DOCD: CPT | Mod: ,,, | Performed by: NURSE PRACTITIONER

## 2025-07-07 PROCEDURE — 3074F SYST BP LT 130 MM HG: CPT | Mod: ,,, | Performed by: NURSE PRACTITIONER

## 2025-07-07 PROCEDURE — 1125F AMNT PAIN NOTED PAIN PRSNT: CPT | Mod: ,,, | Performed by: NURSE PRACTITIONER

## 2025-07-07 PROCEDURE — 99214 OFFICE O/P EST MOD 30 MIN: CPT | Mod: ,,, | Performed by: NURSE PRACTITIONER

## 2025-07-07 PROCEDURE — 1159F MED LIST DOCD IN RCRD: CPT | Mod: ,,, | Performed by: NURSE PRACTITIONER

## 2025-07-07 PROCEDURE — 1101F PT FALLS ASSESS-DOCD LE1/YR: CPT | Mod: ,,, | Performed by: NURSE PRACTITIONER

## 2025-07-07 PROCEDURE — 3044F HG A1C LEVEL LT 7.0%: CPT | Mod: ,,, | Performed by: NURSE PRACTITIONER

## 2025-07-07 PROCEDURE — 1160F RVW MEDS BY RX/DR IN RCRD: CPT | Mod: ,,, | Performed by: NURSE PRACTITIONER

## 2025-07-07 PROCEDURE — 3078F DIAST BP <80 MM HG: CPT | Mod: ,,, | Performed by: NURSE PRACTITIONER

## 2025-07-07 RX ORDER — SEMAGLUTIDE 1.7 MG/.75ML
1.7 INJECTION, SOLUTION SUBCUTANEOUS
Qty: 3 ML | Refills: 0 | Status: SHIPPED | OUTPATIENT
Start: 2025-07-07 | End: 2025-07-09 | Stop reason: DRUGHIGH

## 2025-07-07 NOTE — ASSESSMENT & PLAN NOTE
Lab Results   Component Value Date    HGBA1C 5.9 03/03/2025    HGBA1C 5.9 11/21/2024    HGBA1C 6.3 05/20/2024     Stable, last A1c improved with lifestyle changes and weight loss.  Hospitalist attempted to add Ozempic due to TIA and other co-morbidities at [facility] 3/04/25, but it was denied due to no history T2DM with A1c > 6.5%.  I was able to get Wegovy approved due to history TIAs, ASCVD, and BMI > 35.    Patient does not have history of T2DM so those care gaps were not addressed.

## 2025-07-07 NOTE — PROGRESS NOTES
[facility]  [address] [place] [address]  Phone: [phone]  Fax: [phone]       PATIENT NAME: Chano Clement   : 1950    AGE: 74 y.o. DATE OF ENCOUNTER: 25    MRN: [id number]      PCP: Calista Wright FNP    Subjective:   CHANGE CHIEF COMPLAINT      :[id number]}274}  Chief Complaint   Patient presents with    Weight Management NEW     Patient reports to the clinic today for 1 month follow up. He states [facility] did not have his Wegovy prescription.    Health Maintenance     Foot Exam Never done  Shingles Vaccine(1 of 2) declined  COVID-19 Vaccine(3 - 2024-25 season) declined  Diabetic Eye Exam needs scheduling  Diabetes Urine Screening due   RSV Vaccine (Age 60+ and Pregnant patients)(1 - 1-dose 75+ series) declined       History of Present Illness    HPI:  Patient is a 74    -year-old established patient who was started on Wegovy for weight management due to his high risk for major adverse cardiovascular events. His risk factors include a history of multiple TIAs, hypertension, hyperlipidemia, and an initial BMI greater than 35 with pre-diabetes. Prior to starting Wegovy on 3/11/25, his weight was 245 lbs with a BMI of 36.2. He has been titrated up on the medication and is currently tolerating it without side effects. His weight has decreased to 232 lbs, representing a 13 lb weight loss since initiating Wegovy.    Patient reports increasing weakness in his legs and difficulty walking, which he attributes to worsening back pain. He has a history of 3 back surgeries, with only the first one (for a ruptured disc) providing significant relief. His spine is curved and he has extensive arthritis in the area. [facility] [place] [facility] advised to inquire about obtaining a lift chair to access his upstairs bedroom at the rehab department or [facility] in [place]. He was evaluated by a specialist at [facility] in [place] who  suggested the possibility of surgery to build a structure around the spine to relieve pressure, noting a 40% success rate and a painful, prolonged recovery period.    Patient denies having type 2 diabetes or any history of type 2 diabetes.      ROS:  Musculoskeletal: +back pain, +muscle weakness, +difficulty walking         Reports he was unable to get his prescription for Wegovy 1.7 mg from the pharmacy 07/05/2025 despite it being sent with details below.   Disp Refills Start End FELIX   semaglutide, weight loss, (WEGOVY) 1.7 mg/0.75 mL PnIj 3 mL 0 7/2/2025 -- No   Sig - Route: Inject 1.7 mg into the skin every 7 days. - Subcutaneous   Sent to pharmacy as: semaglutide, weight loss, (WEGOVY) 1.7 mg/0.75 mL PnIj   Class: Normal   Order: [id number]   Date/Time Signed: 7/2/2025 16:34       E-Prescribing Status: Receipt confirmed by pharmacy (7/2/2025  4:35 PM CDT)     Pharmacy    [id number] - [place], [place] - [address] AT [facility]    Associated Diagnoses    Atherosclerotic cardiovascular disease      TIA (transient ischemic attack)      Severe obesity (BMI 35.0-35.9 with comorbidity)           Allergies and Meds: 274}     Review of patient's allergies indicates:  No Known Allergies     Current Outpatient Medications   Medication Sig Dispense Refill    albuterol (VENTOLIN HFA) 90 mcg/actuation inhaler Inhale 2 puffs into the lungs every 4 (four) hours as needed for Wheezing or Shortness of Breath. Rescue 18 g 1    alfuzosin (UROXATRAL) 10 mg Tb24 Take 1 tablet (10 mg total) by mouth daily with breakfast. 90 tablet 3    aspirin (ECOTRIN) 81 MG EC tablet Take 1 tablet (81 mg total) by mouth once daily. for 21 days 21 tablet 0    atorvastatin (LIPITOR) 40 MG tablet Take 1 tablet (40 mg total) by mouth once daily. 90 tablet 3    cetirizine (ZYRTEC) 10 MG tablet Take 1 tablet (10 mg total) by mouth once daily. 90 tablet 3    clopidogreL (PLAVIX) 75 mg tablet Take 1 tablet  (75 mg total) by mouth once daily. 90 tablet 1    cyclobenzaprine (FLEXERIL) 10 MG tablet Take 1 tablet (10 mg total) by mouth nightly as needed for Muscle spasms. 90 tablet 1    diclofenac sodium (VOLTAREN) 1 % Gel Apply to back four times daily as needed for pain 900 g 11    DULoxetine (CYMBALTA) 60 MG capsule Take 1 capsule (60 mg total) by mouth 2 (two) times daily. 180 capsule 3    gabapentin (NEURONTIN) 600 MG tablet Take 2 tablets (1,200 mg total) by mouth 2 (two) times daily. 360 tablet 1    HYDROcodone-acetaminophen (NORCO)  mg per tablet Take 1 tablet by mouth 3 (three) times daily as needed for Pain. 90 tablet 0    lisinopriL (PRINIVIL,ZESTRIL) 40 MG tablet Take 1 tablet (40 mg total) by mouth once daily. 90 tablet 3    metFORMIN (GLUCOPHAGE-XR) 500 MG ER 24hr tablet Take 2 tablets (1,000 mg total) by mouth Daily. 180 tablet 3    multivitamin (THERAGRAN) per tablet Take 1 tablet by mouth once daily.      omega-3 fatty acids/fish oil (FISH OIL-OMEGA-3 FATTY ACIDS) 300-1,000 mg capsule Take 1 capsule by mouth once daily.      pramipexole (MIRAPEX) 0.25 MG tablet Take 1 tablet (0.25 mg total) by mouth every evening. 90 tablet 3    traZODone (DESYREL) 50 MG tablet Take 4 tablets (200 mg total) by mouth every evening. 360 tablet 3    semaglutide, weight loss, (WEGOVY) 1.7 mg/0.75 mL PnIj Inject 1.7 mg into the skin every 7 days. 3 mL 0     No current facility-administered medications for this visit.       Labs:274}   I have reviewed labs below:    Lab Results   Component Value Date    WBC 8.09 04/29/2025    RBC 4.54 (L) 04/29/2025    HGB 13.5 04/29/2025    HCT 41.0 04/29/2025     04/29/2025     04/29/2025    K 4.7 04/29/2025     04/29/2025    CALCIUM 9.2 04/29/2025    GLU 84 04/29/2025    BUN 18 04/29/2025    CREATININE 1.17 04/29/2025    EGFRNONAA 80 04/12/2022    ALT 10 04/29/2025    AST 16 04/29/2025    INR 1.04 03/04/2025    CHOL 103 03/03/2025    TRIG 44 03/03/2025    HDL 48  03/03/2025    LDLCALC 46 03/03/2025    TSH 0.879 03/03/2025    PSA 0.762 02/25/2025    HGBA1C 5.9 03/03/2025    MICROALBUR 0.5 11/14/2023       Medical History: 274}     Past Medical History:   Diagnosis Date    Anxiety disorder, unspecified     Chronic bilateral low back pain with left-sided sciatica 04/12/2022    Chronic rhinitis 04/12/2022    Depressive disorder     Diabetic [facility]20/20[facility]    Essential (primary) hypertension     GERD (gastroesophageal reflux disease)     History of colon polyps 04/12/2022    Hyperlipidemia     Laceration of left wrist 11/21/2023    RAYMUNDO on CPAP     Prediabetes 04/19/2023    Lab Results Component Value Date  HGBA1C 6.3 04/19/2023      RLS (restless legs syndrome)     TIA (transient ischemic attack)       Tobacco Use History[1]   Past Surgical History:   Procedure Laterality Date    BACK SURGERY      Caudal HARPER  02/18/2013    Dr Rdz    FOOT SURGERY Left     Left l4-L5 TFESI Left 06/20/2012    Dr Rdz    MOLE REMOVAL      NERVE REPAIR Left 11/21/2023    Procedure: REPAIR, SUPERFICIAL BRANCH OF RADIAL   NERVE;  Surgeon: Val Clifton MD;  Location: [facility] OR;  Service: General;  Laterality: Left;    REPAIR OF TENDON OF UPPER EXTREMITY Left 11/21/2023    Procedure: REPAIR BRACHIORADIALIS TENDON;  Surgeon: Val Clifton MD;  Location: [facility] OR;  Service: General;  Laterality: Left;    REPAIR, ARTERY, RADIAL Left 11/21/2023    Procedure: REPAIR/LIGATION, ARTERY, RADIAL;  Surgeon: Val Clifton MD;  Location: [facility] OR;  Service: General;  Laterality: Left;    SHOULDER ARTHROSCOPY Bilateral     T3-T4 HARPER  02/27/2012    Dr Rdz    TONSILLECTOMY      UVULECTOMY          Health Maintenance:      Health Maintenance Topics with due status: Not Due       Topic Last Completion Date    Colorectal Cancer Screening 05/10/2021    TETANUS VACCINE 11/21/2023    Influenza Vaccine 11/21/2024    PROSTATE-SPECIFIC ANTIGEN 02/25/2025  "   Lipid Panel 03/03/2025    Hemoglobin A1c 03/03/2025    High Dose Statin 07/07/2025       Objective:  274}   Vital Signs  Temp: 98.8 °F (37.1 °C)  Temp Source: Oral  Pulse: 73  Resp: 20  SpO2: 97 %  BP: 97/66  BP Location: Left arm  Patient Position: Sitting  Pain Score:   6  Height and Weight  Height: 5' 10" (177.8 cm)  Weight: 105.6 kg (232 lb 12.8 oz)  BSA (Calculated - sq m): 2.28 sq meters  BMI (Calculated): 33.4  Weight in (lb) to have BMI = 25: 173.9    Over the last two weeks how often have you been bothered by little interest or pleasure in doing things: 0  Over the last two weeks how often have you been bothered by feeling down, depressed or hopeless: 0  PHQ-2 Total Score: 0  PHQ-9 Score: 0  PHQ-9 Interpretation: Minimal or None    Wt Readings from Last 3 Encounters:   07/07/25 105.6 kg (232 lb 12.8 oz)   06/05/25 107.5 kg (237 lb)   06/02/25 108.4 kg (239 lb)     Physical Exam  Vitals and nursing note reviewed.   Constitutional:       General: He is not in acute distress.     Appearance: Normal appearance. He is obese. He is not ill-appearing.   HENT:      Head: Normocephalic.   Eyes:      Conjunctiva/sclera: Conjunctivae normal.   Cardiovascular:      Rate and Rhythm: Normal rate and regular rhythm.      Heart sounds: Normal heart sounds.   Pulmonary:      Effort: Pulmonary effort is normal. No respiratory distress.      Breath sounds: Normal breath sounds. No wheezing, rhonchi or rales.   Skin:     General: Skin is warm and dry.      Coloration: Skin is not jaundiced or pale.   Neurological:      Mental Status: He is alert and oriented to person, place, and time.      Gait: Gait normal.   Psychiatric:         Mood and Affect: Mood normal.         Behavior: Behavior normal.         Thought Content: Thought content normal.         Judgment: Judgment normal.          Assessment and Plan: 274}       1. Severe obesity (BMI 35.0-35.9 with comorbidity)  Overview:  3/11/25 Pre-Wegovy 245 lbs with BMI 36.2; " started Wegovy 4/09/25    Treatment goal:  To lose 5% of body weight or 12 lbs (233 lbs) in the first 16-24 wks of treatment and a long term weight loss goal of 10-15% or more body weight to improve weight related co-morbidities and overall health and well-being.     Assessment & Plan:  Wt Readings from Last 3 Encounters:   07/07/25 105.6 kg (232 lb 12.8 oz)   06/05/25 107.5 kg (237 lb)   06/02/25 108.4 kg (239 lb)     03/11/25 111.1 kg (245 lb)     In accordance with ACE/AACE guidelines and FDA indication, recommend adding medication management with Wegovy/semaglutide in conjunction with a healthy, reduced calorie diet and increased physical activity to improve overall health and reduce the risk of major adverse cardiovascular event.  Pt is at higher risk for MACE due to known history of multiple TIAs, HTN, HLD, BMI > 35, prediabetes.    Tolerating Wegovy without side effects.    Patient has lost 13 lb since starting Wegovy in conjunction with therapeutic lifestyle changes.    Titrate Wegovy up to 1.7 mg weekly in conjunction with therapeutic lifestyle changes.      Orders:  -     semaglutide, weight loss, (WEGOVY) 1.7 mg/0.75 mL PnIj; Inject 1.7 mg into the skin every 7 days.  Dispense: 3 mL; Refill: 0    2. Essential (primary) hypertension  Assessment & Plan:  Well controlled  BP Readings from Last 3 Encounters:   07/07/25 97/66   06/05/25 125/66   06/02/25 116/76     Continue lisinopril 40 mg daily.      3. Atherosclerotic cardiovascular disease  -     semaglutide, weight loss, (WEGOVY) 1.7 mg/0.75 mL PnIj; Inject 1.7 mg into the skin every 7 days.  Dispense: 3 mL; Refill: 0    4. TIA (transient ischemic attack)  -     semaglutide, weight loss, (WEGOVY) 1.7 mg/0.75 mL PnIj; Inject 1.7 mg into the skin every 7 days.  Dispense: 3 mL; Refill: 0    5. Chronic bilateral low back pain with left-sided sciatica  Assessment & Plan:  Progressively worsening LBP with leg weakness.  Hx x3 back surgeries.  [facility] at [facility].      6. Prediabetes  Overview:  Dx w/ prediabetes April 2023 with A1c 6.3%.      Assessment & Plan:  Lab Results   Component Value Date    HGBA1C 5.9 03/03/2025    HGBA1C 5.9 11/21/2024    HGBA1C 6.3 05/20/2024     Stable, last A1c improved with lifestyle changes and weight loss.  Hospitalist attempted to add Ozempic due to TIA and other co-morbidities at [facility] 3/04/25, but it was denied due to no history T2DM with A1c > 6.5%.  I was able to get Wegovy approved due to history TIAs, ASCVD, and BMI > 35.    Patient does not have history of T2DM so those care gaps were not addressed.            Assessment & Plan    IMPRESSION:  - On Wegovy for weight management due to high risk for major adverse cardiovascular events (history of multiple TIAs, HTN, HLD, initial BMI >35, prediabetes).  - Weight loss progress: 13 lbs since Wegovy initiation (245 lbs to 232 lbs).  - BP on the low side (97/66); may need to titrate down lisinopril in the future if weight loss continues.    ESSENTIAL HYPERTENSION:  - Continued lisinopril 40 mg daily.  - Blood pressure is on the low side at 97/66.  - May need to titrate down losartan if the patient continues to lose weight.    OBESITY MANAGEMENT:  - Continued Wegovy titration to 1.7 mg weekly as the patient tolerates well without side effects.  - Initial BMI was 36.2 prior to treatment.  - Current weight is 232 lbs, down from 245 lbs, indicating a weight loss of 13 lbs.  - Plan to transition to a maintenance dose of 2.4 mg in 1 month if the patient is doing well.    LOW BACK PAIN AND SPINAL ISSUES:  - Monitored the patient's reports of increasing weakness in legs and back, difficulty walking, and worsening back pain.  - Patient has a history of 3 back surgeries with diminishing effectiveness, significant arthritis in the area, and a curved spine.  - Discussed specialist at [facility]'s suggestion for surgery with a 40% success rate to build a  structure around the spine.    GAIT AND MOBILITY ABNORMALITIES:  - Monitored the patient's reports of increasing weakness in legs and difficulty walking, which appears related to the spinal issues noted above.    FOLLOW-UP:  - Scheduled follow up in 1 month for weight management, then transition to 3-month follow-ups if doing well.       Follow up in about 1 month (around 2025) for Wegovy/weight management.    Signature:  ARGENTINA West-BC    Future Appointments   Date Time Provider Department Center   2025 11:40 AM Calista Wright [facility] DUC [place]   2025 10:45 AM Bean Powell [facility] NEURO [place] [facility]   2026  1:40 PM Esthela Jaramillo MD [facility]  PULM [place] [facility]   2026  8:00 AM AWV NURSE, [facility] FAMILY MEDICINE [facility] DUC [place]     This note was generated with the assistance of ambient listening technology. Verbal consent was obtained by the patient and accompanying visitor(s) for the recording of patient appointment to facilitate this note. I attest to having reviewed and edited the generated note for accuracy, though some syntax or spelling errors may persist. Please contact the author of this note for any clarification.           [1]   Social History  Tobacco Use   Smoking Status Former    Current packs/day: 0.00    Average packs/day: 1 pack/day for 10.0 years (10.0 ttl pk-yrs)    Types: Cigarettes    Start date:     Quit date:     Years since quittin.5    Passive exposure: Never   Smokeless Tobacco Never   Tobacco Comments    Use to be a smoker, quit in . Stated he smoked for about 10 years

## 2025-07-07 NOTE — ASSESSMENT & PLAN NOTE
Well controlled  BP Readings from Last 3 Encounters:   07/07/25 97/66   06/05/25 125/66   06/02/25 116/76     Continue lisinopril 40 mg daily.

## 2025-07-07 NOTE — ASSESSMENT & PLAN NOTE
Wt Readings from Last 3 Encounters:   07/07/25 105.6 kg (232 lb 12.8 oz)   06/05/25 107.5 kg (237 lb)   06/02/25 108.4 kg (239 lb)     03/11/25 111.1 kg (245 lb)     In accordance with ACE/AACE guidelines and FDA indication, recommend adding medication management with Wegovy/semaglutide in conjunction with a healthy, reduced calorie diet and increased physical activity to improve overall health and reduce the risk of major adverse cardiovascular event.  Pt is at higher risk for MACE due to known history of multiple TIAs, HTN, HLD, BMI > 35, prediabetes.    Tolerating Wegovy without side effects.    Patient has lost 13 lb since starting Wegovy in conjunction with therapeutic lifestyle changes.    Titrate Wegovy up to 1.7 mg weekly in conjunction with therapeutic lifestyle changes.

## 2025-07-07 NOTE — ASSESSMENT & PLAN NOTE
Progressively worsening LBP with leg weakness.  Hx x3 back surgeries.  [facility] at [facility].

## 2025-07-09 ENCOUNTER — TELEPHONE (OUTPATIENT)
Dept: FAMILY MEDICINE | Facility: CLINIC | Age: 75
End: 2025-07-09
Payer: COMMERCIAL

## 2025-07-09 DIAGNOSIS — G45.9 TIA (TRANSIENT ISCHEMIC ATTACK): ICD-10-CM

## 2025-07-09 DIAGNOSIS — I25.10 ATHEROSCLEROTIC CARDIOVASCULAR DISEASE: Chronic | ICD-10-CM

## 2025-07-09 DIAGNOSIS — E66.01 SEVERE OBESITY (BMI 35.0-35.9 WITH COMORBIDITY): Chronic | ICD-10-CM

## 2025-07-09 RX ORDER — SEMAGLUTIDE 2.4 MG/.75ML
2.4 INJECTION, SOLUTION SUBCUTANEOUS
Qty: 3 ML | Refills: 0 | Status: SHIPPED | OUTPATIENT
Start: 2025-07-09

## 2025-07-09 NOTE — TELEPHONE ENCOUNTER
Copied from CRM #[id number]. Topic: Medications - Medication Status Check   >> Jul 9, 2025  4:30 PM Nelsy wrote:  Patient called to check status of Wegovy 2.4 stated Shelbie currently has not received med.    I was extremely busy in clinic today and did not receive the message about changing him from 1.7 to 2.4 because of the medication back order until after I finish seeing patients.  This prescription was sent to his pharmacy at 5:16 p.m.     Disp Refills Start End FELIX   semaglutide, weight loss, (WEGOVY) 2.4 mg/0.75 mL PnIj 3 mL 0 7/9/2025 -- No   Sig - Route: Inject 2.4 mg into the skin every 7 days. - Subcutaneous   Sent to pharmacy as: semaglutide, weight loss, (WEGOVY) 2.4 mg/0.75 mL PnIj   Class: Normal   Order: [id number]   Date/Time Signed: 7/9/2025 17:15       E-Prescribing Status: Receipt confirmed by pharmacy (7/9/2025  5:16 PM CDT)     Pharmacy    [id number] - [place], [place] - [address]  AT [facility]    Associated Diagnoses    Severe obesity (BMI 35.0-35.9 with comorbidity)      Atherosclerotic cardiovascular disease      TIA (transient ischemic attack)             Msg to  to notify pt prescription/refill has been sent.

## 2025-07-09 NOTE — TELEPHONE ENCOUNTER
Copied from CRM #[id number]. Topic: Medications - Medication Authorization  >> Jul 9, 2025  3:13 PM Nelsy wrote:  Patient called stated he is having a little trouble getting the Wegovy 1.7, requesting a call back.

## 2025-07-09 NOTE — TELEPHONE ENCOUNTER
We were assured by the drug reps that all the back order issues had resolved-  what in the world?  Rx sent for 2.4 mg.

## 2025-07-16 DIAGNOSIS — I10 ESSENTIAL (PRIMARY) HYPERTENSION: Chronic | ICD-10-CM

## 2025-07-16 DIAGNOSIS — F33.9 RECURRENT DEPRESSION: Primary | Chronic | ICD-10-CM

## 2025-07-16 RX ORDER — LISINOPRIL 40 MG/1
40 TABLET ORAL DAILY
Qty: 90 TABLET | Refills: 3 | Status: SHIPPED | OUTPATIENT
Start: 2025-07-16

## 2025-07-16 RX ORDER — DULOXETIN HYDROCHLORIDE 60 MG/1
60 CAPSULE, DELAYED RELEASE ORAL 2 TIMES DAILY
Qty: 180 CAPSULE | Refills: 3 | Status: SHIPPED | OUTPATIENT
Start: 2025-07-16

## 2025-07-16 NOTE — TELEPHONE ENCOUNTER
Copied from CRM #[id number]. Topic: Medications - Medication Refill  >> Jul 16, 2025  9:06 AM Miranda wrote:  Who Called: Chano Clement    Refill or New Rx:Refill  RX Name and Strength:DULoxetine (CYMBALTA) 60 MG capsule   lisinopriL (PRINIVIL,ZESTRIL) 40 MG tablet       How is the patient currently taking it? (ex. 1XDay):  Is this a 30 day or 90 day RX:  Local or Mail Order:local   List of preferred pharmacies on file (remove unneeded): [unfilled]  If different Pharmacy is requested, enter Pharmacy information here including location and phone number: [facility] - [place], [place] - [address]     Ordering Provider:      Preferred Method of Contact: Phone Call  Patient's Preferred Phone Number on File: [phone]   Best Call Back Number, if different:  Additional Information:

## 2025-08-08 ENCOUNTER — OFFICE VISIT (OUTPATIENT)
Dept: FAMILY MEDICINE | Facility: CLINIC | Age: 75
End: 2025-08-08
Payer: COMMERCIAL

## 2025-08-08 VITALS
OXYGEN SATURATION: 100 % | BODY MASS INDEX: 33.36 KG/M2 | WEIGHT: 233 LBS | HEIGHT: 70 IN | RESPIRATION RATE: 20 BRPM | TEMPERATURE: 98 F | DIASTOLIC BLOOD PRESSURE: 63 MMHG | SYSTOLIC BLOOD PRESSURE: 121 MMHG | HEART RATE: 62 BPM

## 2025-08-08 DIAGNOSIS — F33.9 RECURRENT DEPRESSION: Chronic | ICD-10-CM

## 2025-08-08 DIAGNOSIS — R10.31 RIGHT INGUINAL PAIN: Primary | ICD-10-CM

## 2025-08-08 DIAGNOSIS — G62.9 NEUROPATHY: Chronic | ICD-10-CM

## 2025-08-08 DIAGNOSIS — G47.9 DIFFICULTY SLEEPING: Chronic | ICD-10-CM

## 2025-08-08 DIAGNOSIS — R73.03 PREDIABETES: Chronic | ICD-10-CM

## 2025-08-08 LAB
EST. AVERAGE GLUCOSE BLD GHB EST-MCNC: 114 MG/DL
HBA1C MFR BLD HPLC: 5.6 %

## 2025-08-08 RX ORDER — TRAZODONE HYDROCHLORIDE 50 MG/1
200 TABLET ORAL NIGHTLY
Qty: 360 TABLET | Refills: 0 | Status: SHIPPED | OUTPATIENT
Start: 2025-08-08 | End: 2025-11-06

## 2025-08-08 RX ORDER — METFORMIN HYDROCHLORIDE 500 MG/1
1000 TABLET, EXTENDED RELEASE ORAL DAILY
Qty: 180 TABLET | Refills: 0 | Status: SHIPPED | OUTPATIENT
Start: 2025-08-08 | End: 2025-11-06

## 2025-08-08 RX ORDER — GABAPENTIN 600 MG/1
1200 TABLET ORAL 2 TIMES DAILY
Qty: 360 TABLET | Refills: 0 | Status: SHIPPED | OUTPATIENT
Start: 2025-08-08 | End: 2025-11-06

## 2025-08-08 NOTE — PROGRESS NOTES
Subjective:       Patient ID: Chano Clement is a 74 y.o. male.    Chief Complaint: Groin Pain (Pt stated he picked up something and think he pulled something in the groining area)    Right groin pain after picking up something heavy- he is concerned about a hernia  Medication refills until his PCP Calista Wright P RTC  Denies any other issues today  Review of Systems   Constitutional:  Negative for activity change, appetite change, chills, fatigue, fever and unexpected weight change.   HENT:  Negative for nasal congestion, ear pain, sore throat, trouble swallowing and voice change.    Eyes:  Negative for photophobia, pain, discharge, itching and visual disturbance.   Respiratory:  Negative for cough, chest tightness, shortness of breath and wheezing.    Cardiovascular:  Negative for chest pain, palpitations and leg swelling.   Gastrointestinal:  Negative for abdominal distention, abdominal pain, change in bowel habit, nausea and vomiting.   Endocrine: Negative for cold intolerance, heat intolerance, polydipsia, polyphagia and polyuria.   Genitourinary:  Negative for bladder incontinence, decreased urine volume, dysuria, flank pain, frequency, genital sores and urgency.   Musculoskeletal:  Negative for arthralgias, back pain, gait problem and neck pain.   Integumentary:  Negative for rash and wound.   Neurological:  Negative for dizziness, vertigo, syncope, weakness, light-headedness, numbness and headaches.   Psychiatric/Behavioral:  Negative for behavioral problems, depressed mood, self-injury, sleep disturbance and suicidal ideas. The patient is not nervous/anxious.    All other systems reviewed and are negative.      Objective:      Physical Exam  Vitals and nursing note reviewed.   Constitutional:       General: He is not in acute distress.     Appearance: Normal appearance. He is not ill-appearing, toxic-appearing or diaphoretic.   HENT:      Head: Normocephalic.      Mouth/Throat:      Mouth: Mucous  membranes are moist.   Eyes:      General: No scleral icterus.     Extraocular Movements: Extraocular movements intact.      Pupils: Pupils are equal, round, and reactive to light.   Cardiovascular:      Rate and Rhythm: Normal rate and regular rhythm.      Pulses: Normal pulses.      Heart sounds: Normal heart sounds. No murmur heard.  Pulmonary:      Effort: Pulmonary effort is normal. No respiratory distress.      Breath sounds: Normal breath sounds. No wheezing, rhonchi or rales.   Abdominal:      General: Bowel sounds are normal. There is no distension.      Palpations: Abdomen is soft. There is no mass.      Tenderness: There is no abdominal tenderness. There is no guarding or rebound.      Hernia: No hernia is present.          Comments: Mildly TTP in the right groin area- no hernia appreciated   Musculoskeletal:         General: Normal range of motion.      Cervical back: Normal range of motion and neck supple. No tenderness.   Lymphadenopathy:      Cervical: No cervical adenopathy.   Skin:     General: Skin is warm and dry.      Capillary Refill: Capillary refill takes less than 2 seconds.      Findings: No rash.   Neurological:      Mental Status: He is alert and oriented to person, place, and time.   Psychiatric:         Mood and Affect: Mood normal.         Behavior: Behavior normal.         Thought Content: Thought content normal.         Judgment: Judgment normal.          Assessment:       1. Right inguinal pain    2. Neuropathy    3. Recurrent depression    4. Difficulty sleeping    5. Prediabetes        Plan:   Right inguinal pain    Neuropathy  -     gabapentin (NEURONTIN) 600 MG tablet; Take 2 tablets (1,200 mg total) by mouth 2 (two) times daily.  Dispense: 360 tablet; Refill: 0    Recurrent depression  -     traZODone (DESYREL) 50 MG tablet; Take 4 tablets (200 mg total) by mouth every evening.  Dispense: 360 tablet; Refill: 0    Difficulty sleeping  -     traZODone (DESYREL) 50 MG tablet; Take  4 tablets (200 mg total) by mouth every evening.  Dispense: 360 tablet; Refill: 0    Prediabetes  -     metFORMIN (GLUCOPHAGE-XR) 500 MG ER 24hr tablet; Take 2 tablets (1,000 mg total) by mouth Daily.  Dispense: 180 tablet; Refill: 0  -     Hemoglobin A1C; Future; Expected date: 08/08/2025           Risks, benefits, and side effects were discussed with the patient. All questions were answered to the fullest satisfaction of the patient, and pt verbalized understanding and agreement to treatment plan. Pt was to call with any new or worsening symptoms, or present to the ER

## 2025-08-22 ENCOUNTER — OFFICE VISIT (OUTPATIENT)
Dept: NEUROLOGY | Facility: CLINIC | Age: 75
End: 2025-08-22
Payer: COMMERCIAL

## 2025-08-22 VITALS
RESPIRATION RATE: 18 BRPM | HEART RATE: 75 BPM | SYSTOLIC BLOOD PRESSURE: 109 MMHG | OXYGEN SATURATION: 96 % | DIASTOLIC BLOOD PRESSURE: 59 MMHG | BODY MASS INDEX: 32.5 KG/M2 | HEIGHT: 70 IN | WEIGHT: 227 LBS

## 2025-08-22 DIAGNOSIS — G45.9 TIA (TRANSIENT ISCHEMIC ATTACK): Primary | ICD-10-CM

## 2025-08-22 PROCEDURE — 99213 OFFICE O/P EST LOW 20 MIN: CPT | Mod: S$PBB,,, | Performed by: NURSE PRACTITIONER

## 2025-08-22 PROCEDURE — 99999 PR PBB SHADOW E&M-EST. PATIENT-LVL II: CPT | Mod: PBBFAC,,, | Performed by: NURSE PRACTITIONER

## 2025-08-22 PROCEDURE — 99212 OFFICE O/P EST SF 10 MIN: CPT | Mod: PBBFAC | Performed by: NURSE PRACTITIONER

## 2025-08-22 PROCEDURE — 1160F RVW MEDS BY RX/DR IN RCRD: CPT | Mod: CPTII,,, | Performed by: NURSE PRACTITIONER

## 2025-08-22 PROCEDURE — 3044F HG A1C LEVEL LT 7.0%: CPT | Mod: CPTII,,, | Performed by: NURSE PRACTITIONER

## 2025-08-22 PROCEDURE — 1159F MED LIST DOCD IN RCRD: CPT | Mod: CPTII,,, | Performed by: NURSE PRACTITIONER

## 2025-08-26 DIAGNOSIS — M62.838 MUSCLE SPASM: ICD-10-CM

## 2025-08-26 RX ORDER — CYCLOBENZAPRINE HCL 10 MG
10 TABLET ORAL NIGHTLY PRN
Qty: 90 TABLET | Refills: 1 | Status: SHIPPED | OUTPATIENT
Start: 2025-08-26

## 2025-09-02 DIAGNOSIS — G25.81 RLS (RESTLESS LEGS SYNDROME): Chronic | ICD-10-CM

## 2025-09-02 RX ORDER — PRAMIPEXOLE DIHYDROCHLORIDE 0.25 MG/1
0.25 TABLET ORAL NIGHTLY
Qty: 90 TABLET | Refills: 3 | Status: CANCELLED | OUTPATIENT
Start: 2025-09-02 | End: 2026-09-02

## (undated) DEVICE — GLOVE PROTEXIS PI SYN SURG 7

## (undated) DEVICE — SUT PROLENE 6-0 BV-1 30IN

## (undated) DEVICE — SUT 3-0 ETHILON 18 FS-1

## (undated) DEVICE — GLOVE PROTEXIS PI SYN SURG 7.5

## (undated) DEVICE — SUT PROLENE 7-0 BV-1 30 BLU

## (undated) DEVICE — TRAY SKIN SCRUB WET STANDARD

## (undated) DEVICE — SUT 3-0 VICRYL / SH (J416)

## (undated) DEVICE — Device

## (undated) DEVICE — SPONGE COTTON TRAY 4X4IN

## (undated) DEVICE — GLOVE PROTEXIS PI SYN SURG 6.5

## (undated) DEVICE — SUT PROLENE 5-0 36IN C-1

## (undated) DEVICE — SUT VICRYL 2-0 36 CT-1

## (undated) DEVICE — GLOVE 6.5 PROTEXIS PI BLUE